# Patient Record
Sex: MALE | Race: WHITE | NOT HISPANIC OR LATINO
[De-identification: names, ages, dates, MRNs, and addresses within clinical notes are randomized per-mention and may not be internally consistent; named-entity substitution may affect disease eponyms.]

---

## 2020-09-30 ENCOUNTER — TRANSCRIPTION ENCOUNTER (OUTPATIENT)
Age: 80
End: 2020-09-30

## 2020-10-01 ENCOUNTER — EMERGENCY (EMERGENCY)
Facility: HOSPITAL | Age: 80
LOS: 1 days | Discharge: HOME | End: 2020-10-01
Attending: EMERGENCY MEDICINE | Admitting: EMERGENCY MEDICINE
Payer: MEDICARE

## 2020-10-01 VITALS
OXYGEN SATURATION: 99 % | DIASTOLIC BLOOD PRESSURE: 116 MMHG | RESPIRATION RATE: 18 BRPM | HEIGHT: 71 IN | TEMPERATURE: 98 F | HEART RATE: 93 BPM | SYSTOLIC BLOOD PRESSURE: 149 MMHG | WEIGHT: 175.05 LBS

## 2020-10-01 VITALS
DIASTOLIC BLOOD PRESSURE: 80 MMHG | RESPIRATION RATE: 16 BRPM | SYSTOLIC BLOOD PRESSURE: 170 MMHG | HEART RATE: 77 BPM | OXYGEN SATURATION: 98 % | TEMPERATURE: 97 F
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
## 2020-10-01 DIAGNOSIS — S01.511A LACERATION WITHOUT FOREIGN BODY OF LIP, INITIAL ENCOUNTER: ICD-10-CM

## 2020-10-01 DIAGNOSIS — S02.2XXA FRACTURE OF NASAL BONES, INITIAL ENCOUNTER FOR CLOSED FRACTURE: ICD-10-CM
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
## 2020-10-01 DIAGNOSIS — S01.21XA LACERATION WITHOUT FOREIGN BODY OF NOSE, INITIAL ENCOUNTER: ICD-10-CM

## 2020-10-01 DIAGNOSIS — Z23 ENCOUNTER FOR IMMUNIZATION: ICD-10-CM

## 2020-10-01 DIAGNOSIS — S12.9XXA FRACTURE OF NECK, UNSPECIFIED, INITIAL ENCOUNTER: ICD-10-CM

## 2020-10-01 DIAGNOSIS — R55 SYNCOPE AND COLLAPSE: ICD-10-CM

## 2020-10-01 LAB
ANION GAP SERPL CALC-SCNC: 11 MMOL/L — SIGNIFICANT CHANGE UP (ref 5–17)
APPEARANCE UR: CLEAR — SIGNIFICANT CHANGE UP
BASOPHILS # BLD AUTO: 0.04 K/UL — SIGNIFICANT CHANGE UP (ref 0–0.2)
BASOPHILS NFR BLD AUTO: 0.6 % — SIGNIFICANT CHANGE UP (ref 0–2)
BILIRUB UR-MCNC: NEGATIVE — SIGNIFICANT CHANGE UP
BUN SERPL-MCNC: 29 MG/DL — HIGH (ref 7–23)
CALCIUM SERPL-MCNC: 9.9 MG/DL — SIGNIFICANT CHANGE UP (ref 8.4–10.5)
CHLORIDE SERPL-SCNC: 107 MMOL/L — SIGNIFICANT CHANGE UP (ref 96–108)
CO2 SERPL-SCNC: 23 MMOL/L — SIGNIFICANT CHANGE UP (ref 22–31)
COLOR SPEC: YELLOW — SIGNIFICANT CHANGE UP
CREAT SERPL-MCNC: 1.1 MG/DL — SIGNIFICANT CHANGE UP (ref 0.5–1.3)
DIFF PNL FLD: NEGATIVE — SIGNIFICANT CHANGE UP
EOSINOPHIL # BLD AUTO: 0.14 K/UL — SIGNIFICANT CHANGE UP (ref 0–0.5)
EOSINOPHIL NFR BLD AUTO: 2.2 % — SIGNIFICANT CHANGE UP (ref 0–6)
GLUCOSE SERPL-MCNC: 92 MG/DL — SIGNIFICANT CHANGE UP (ref 70–99)
GLUCOSE UR QL: NEGATIVE — SIGNIFICANT CHANGE UP
HCT VFR BLD CALC: 38.4 % — LOW (ref 39–50)
HGB BLD-MCNC: 12.7 G/DL — LOW (ref 13–17)
IMM GRANULOCYTES NFR BLD AUTO: 0.5 % — SIGNIFICANT CHANGE UP (ref 0–1.5)
KETONES UR-MCNC: 15 MG/DL
LEUKOCYTE ESTERASE UR-ACNC: NEGATIVE — SIGNIFICANT CHANGE UP
LYMPHOCYTES # BLD AUTO: 1.61 K/UL — SIGNIFICANT CHANGE UP (ref 1–3.3)
LYMPHOCYTES # BLD AUTO: 25.8 % — SIGNIFICANT CHANGE UP (ref 13–44)
MAGNESIUM SERPL-MCNC: 2 MG/DL — SIGNIFICANT CHANGE UP (ref 1.6–2.6)
MCHC RBC-ENTMCNC: 31.8 PG — SIGNIFICANT CHANGE UP (ref 27–34)
MCHC RBC-ENTMCNC: 33.1 GM/DL — SIGNIFICANT CHANGE UP (ref 32–36)
MCV RBC AUTO: 96 FL — SIGNIFICANT CHANGE UP (ref 80–100)
MONOCYTES # BLD AUTO: 0.5 K/UL — SIGNIFICANT CHANGE UP (ref 0–0.9)
MONOCYTES NFR BLD AUTO: 8 % — SIGNIFICANT CHANGE UP (ref 2–14)
NEUTROPHILS # BLD AUTO: 3.93 K/UL — SIGNIFICANT CHANGE UP (ref 1.8–7.4)
NEUTROPHILS NFR BLD AUTO: 62.9 % — SIGNIFICANT CHANGE UP (ref 43–77)
NITRITE UR-MCNC: NEGATIVE — SIGNIFICANT CHANGE UP
NRBC # BLD: 0 /100 WBCS — SIGNIFICANT CHANGE UP (ref 0–0)
PH UR: 7.5 — SIGNIFICANT CHANGE UP (ref 5–8)
PLATELET # BLD AUTO: 215 K/UL — SIGNIFICANT CHANGE UP (ref 150–400)
POTASSIUM SERPL-MCNC: 4.4 MMOL/L — SIGNIFICANT CHANGE UP (ref 3.5–5.3)
POTASSIUM SERPL-SCNC: 4.4 MMOL/L — SIGNIFICANT CHANGE UP (ref 3.5–5.3)
PROT UR-MCNC: NEGATIVE MG/DL — SIGNIFICANT CHANGE UP
RBC # BLD: 4 M/UL — LOW (ref 4.2–5.8)
RBC # FLD: 12.5 % — SIGNIFICANT CHANGE UP (ref 10.3–14.5)
SODIUM SERPL-SCNC: 141 MMOL/L — SIGNIFICANT CHANGE UP (ref 135–145)
SP GR SPEC: 1.01 — SIGNIFICANT CHANGE UP (ref 1–1.03)
UROBILINOGEN FLD QL: 1 E.U./DL — SIGNIFICANT CHANGE UP
WBC # BLD: 6.25 K/UL — SIGNIFICANT CHANGE UP (ref 3.8–10.5)
WBC # FLD AUTO: 6.25 K/UL — SIGNIFICANT CHANGE UP (ref 3.8–10.5)

## 2020-10-01 PROCEDURE — 72125 CT NECK SPINE W/O DYE: CPT

## 2020-10-01 PROCEDURE — 72125 CT NECK SPINE W/O DYE: CPT | Mod: 26

## 2020-10-01 PROCEDURE — 13152 CMPLX RPR E/N/E/L 2.6-7.5 CM: CPT

## 2020-10-01 PROCEDURE — 90715 TDAP VACCINE 7 YRS/> IM: CPT

## 2020-10-01 PROCEDURE — 99285 EMERGENCY DEPT VISIT HI MDM: CPT

## 2020-10-01 PROCEDURE — 85025 COMPLETE CBC W/AUTO DIFF WBC: CPT

## 2020-10-01 PROCEDURE — 80048 BASIC METABOLIC PNL TOTAL CA: CPT

## 2020-10-01 PROCEDURE — 99285 EMERGENCY DEPT VISIT HI MDM: CPT | Mod: 25

## 2020-10-01 PROCEDURE — 70486 CT MAXILLOFACIAL W/O DYE: CPT | Mod: 26

## 2020-10-01 PROCEDURE — 70498 CT ANGIOGRAPHY NECK: CPT | Mod: 26

## 2020-10-01 PROCEDURE — 12052 INTMD RPR FACE/MM 2.6-5.0 CM: CPT

## 2020-10-01 PROCEDURE — 93010 ELECTROCARDIOGRAM REPORT: CPT

## 2020-10-01 PROCEDURE — 71045 X-RAY EXAM CHEST 1 VIEW: CPT

## 2020-10-01 PROCEDURE — 81003 URINALYSIS AUTO W/O SCOPE: CPT

## 2020-10-01 PROCEDURE — 70450 CT HEAD/BRAIN W/O DYE: CPT

## 2020-10-01 PROCEDURE — 90471 IMMUNIZATION ADMIN: CPT

## 2020-10-01 PROCEDURE — 83735 ASSAY OF MAGNESIUM: CPT

## 2020-10-01 PROCEDURE — 99284 EMERGENCY DEPT VISIT MOD MDM: CPT

## 2020-10-01 PROCEDURE — 71045 X-RAY EXAM CHEST 1 VIEW: CPT | Mod: 26

## 2020-10-01 PROCEDURE — 70486 CT MAXILLOFACIAL W/O DYE: CPT

## 2020-10-01 PROCEDURE — 70498 CT ANGIOGRAPHY NECK: CPT

## 2020-10-01 PROCEDURE — 93005 ELECTROCARDIOGRAM TRACING: CPT | Mod: XU

## 2020-10-01 PROCEDURE — 70450 CT HEAD/BRAIN W/O DYE: CPT | Mod: 26

## 2020-10-01 PROCEDURE — 82962 GLUCOSE BLOOD TEST: CPT

## 2020-10-01 PROCEDURE — 36415 COLL VENOUS BLD VENIPUNCTURE: CPT

## 2020-10-01 PROCEDURE — 84484 ASSAY OF TROPONIN QUANT: CPT

## 2020-10-01 RX ORDER — SODIUM CHLORIDE 9 MG/ML
1000 INJECTION INTRAMUSCULAR; INTRAVENOUS; SUBCUTANEOUS
Refills: 0 | Status: DISCONTINUED | OUTPATIENT
Start: 2020-10-01 | End: 2020-10-05

## 2020-10-01 RX ORDER — TETANUS TOXOID, REDUCED DIPHTHERIA TOXOID AND ACELLULAR PERTUSSIS VACCINE, ADSORBED 5; 2.5; 8; 8; 2.5 [IU]/.5ML; [IU]/.5ML; UG/.5ML; UG/.5ML; UG/.5ML
0.5 SUSPENSION INTRAMUSCULAR ONCE
Refills: 0 | Status: COMPLETED | OUTPATIENT
Start: 2020-10-01 | End: 2020-10-01

## 2020-10-01 RX ADMIN — TETANUS TOXOID, REDUCED DIPHTHERIA TOXOID AND ACELLULAR PERTUSSIS VACCINE, ADSORBED 0.5 MILLILITER(S): 5; 2.5; 8; 8; 2.5 SUSPENSION INTRAMUSCULAR at 14:27

## 2020-10-01 RX ADMIN — SODIUM CHLORIDE 100 MILLILITER(S): 9 INJECTION INTRAMUSCULAR; INTRAVENOUS; SUBCUTANEOUS at 14:26

## 2020-10-01 NOTE — CONSULT NOTE ADULT - ATTENDING COMMENTS
I have personally seen, examined and participated in the care of this patient.  I have reviewed all pertinent clinical information, including history, physical exam, plan and the residents note. I agree with the above.  Romulo Nicholson is an 80 year old male s/p fall with facial fractures and C6 TP fracture.  He has no radicular pain and is neurologically intact.  Recommend hard cervical orthosis at all times with exception for eating and cleaning.  Patient will follow-up In the office in 2 weeks for evaluation to clear c-spine.  Followup at office at [address] [place] [address] [address] [place] [address]. Phone [phone].

## 2020-10-01 NOTE — ED PROVIDER NOTE - CARE PROVIDER_API CALL
Dwight Rea)  [facility]  [address] [facility], 10th [place] [address]  Phone: [phone]  Fax: [phone]  Follow Up Time:     Rogelio Woodard  PLASTIC SURGERY  [address] [place] [address]  Phone: [phone]  Fax: [phone]  Follow Up Time:     Vicki Gilbert  OTOLARYNGOLOGY  [address] [place] [address], 2nd [place] [address]  Phone: [phone]  Fax: [phone]  Follow Up Time:

## 2020-10-01 NOTE — ED PROVIDER NOTE - CARE PROVIDERS DIRECT ADDRESSES
[email or web address]

## 2020-10-01 NOTE — ED PROVIDER NOTE - PATIENT PORTAL LINK FT
You can access the FollowMyHealth Patient Portal offered by [facility] by registering at the following website: [email or web address]. By joining [employer]’s FollowMyHealth portal, you will also be able to view your health information using other applications (apps) compatible with our system.

## 2020-10-01 NOTE — ED PROVIDER NOTE - PROVIDER TOKENS
PROVIDER:[TOKEN:[[id number]]],PROVIDER:[TOKEN:[[id number]]],PROVIDER:[TOKEN:[[id number]]]

## 2020-10-01 NOTE — ED ADULT NURSE NOTE - OBJECTIVE STATEMENT
Received pt awake alert and oriented x 3 via ambulance. Pt presents to the ED S/P Fall. Pt  was walking outside and felt lightheaded and dizzy, fell forward hit face. no LOC, denies medical problems. no blood thinners. denies headache, blurry vision, no arm drift no facial droop . will continue to monitor.

## 2020-10-01 NOTE — ED PROVIDER NOTE - NSFOLLOWUPINSTRUCTIONS_ED_ALL_ED_FT
You were evaluated in the ED after a near fainting/ fainting episode. You sustained some facial trauma, and plastic surgeon Dr Woodard closed your wounds. You sustained a mildly displaced nasal bone fracture. This should heal on its own. You should see Dr Woodard within 1 week's time for suture removal, wound evaluation, and further evaluation of your nasal bone fracture.     Your CT of the brain, xray of the chest, EKG, and blood work did not reveal any acute abnormalities.     Your CT of the cervical spine showed subtle nondisplaced fracture of the transverse process of C6. You were evaluated by orthopedic spine specialist Dr Rea. It is recommended you keep the hard collar on for the next 2 weeks. Please follow up with Dr Rea in 2 weeks.    Your had a CT angio of the neck which did not show any vascular abnormalities associated with the fracture. You had the following incidental finding: right sided vocal paralysis. Please follow up with ENT for further evaluation of this, and to ensure no further progression requiring treatment.     Return to the ED for recurrent falls, fainting, chest pain, shortness of breath, confusion, slurred speech, numbness/tingling, unilateral weakness, acutely worsening neck pain, or other concerning symptoms.      Cervical Fracture    WHAT YOU NEED TO KNOW:    A cervical fracture is a break in a vertebra (bone) in your neck. The 7 cervical vertebrae are called C1 through C7. Cervical vertebrae support your head and allow your neck to bend and twist. The vertebrae enclose and protect the spinal cord. Nerves in the spinal cord control your ability to move.    Spinal Cord Injury         DISCHARGE INSTRUCTIONS:    Call your local emergency number (911 in the [place]) or have someone else call if:   •You feel lightheaded, short of breath, or have chest pain.      •You cough up blood.      •You cannot feel or move your arms or legs.      Return to the emergency department if:   •You have a sudden, severe headache with nausea and vomiting.      •You are seeing double or suddenly cannot see.      •You cannot stay awake.      •The pins in your halo brace have loosened or look deeper in the skin than before.      •You feel new weakness or numbness in your hands or fingers.      •Your arm or leg feels warm, tender, and painful. It may look swollen and red.      Call your doctor or neurologist if:   •You have a fever.      •You see a skin rash, redness, or sores under your brace.      •You have problems swallowing while you are wearing your halo brace.      •Your neck pain is not getting better even with treatment.      •You have questions or concerns about your cervical fracture, medicine, or care.      Medicines:   •Prescription pain medicine may be given. Ask your healthcare provider how to take this medicine safely. Some prescription pain medicines contain acetaminophen. Do not take other medicines that contain acetaminophen without talking to your healthcare provider. Too much acetaminophen may cause liver damage. Prescription pain medicine may cause constipation. Ask your healthcare provider how to prevent or treat constipation.       •Take your medicine as directed. Contact your healthcare provider if you think your medicine is not helping or if you have side effects. Tell him of her if you are allergic to any medicine. Keep a list of the medicines, vitamins, and herbs you take. Include the amounts, and when and why you take them. Bring the list or the pill bottles to follow-up visits. Carry your medicine list with you in case of an emergency.      Therapy may be recommended. A physical therapist and an occupational therapist may exercise your arms, legs, and hands. They may also teach you new ways to do things around the house. A speech therapist may work with you to help you talk or swallow.    Skin and brace care: Skin breakdown can lead to deep wounds caused by pressure or pulling on your skin. Check your chin, ears, back of your head, and shoulders for redness or sores if you are wearing a brace. Check the skin daily around halo brace pins for signs of infection, such as redness or bad-smelling drainage. Change your vest lining if it gets wet. Ask your healthcare provider how to care for your halo pins and vest. Ask your provider for more information about using a halo brace, semirigid collar, or soft collar.    Follow up with your doctor or neurologist as directed: Write down your questions so you remember to ask them during your visits.      Nasal Fracture    WHAT YOU NEED TO KNOW:    A nasal fracture is a crack or break in your nose. You may have a break in the upper nose (bridge), the side, or the septum. The septum is in the middle of the nose and divides your nostrils.    DISCHARGE INSTRUCTIONS:    Return to the emergency department if:   •You feel like one or both of your nasal passages are blocked and you have trouble breathing.      •Clear fluid is leaking from your nose.      •You have severe nose pain, even after you take medicine.      •You have double vision or have problems moving your eyes.      Call your doctor if:   •You have a fever.      •You continue to have nosebleeds.      •You have a headache that gets worse, even after you take pain medicine.      •Your splint or packing is loose.      •You have questions or concerns about your condition or care.      Medicines:   •Medicine may be given to decrease pain or help prevent a bacterial infection. Ask how to take pain medicine safely. Medicine may also be given to decrease nasal swelling and help make breathing easier.       •Take your medicine as directed. Contact your healthcare provider if you think your medicine is not helping or if you have side effects. Tell him or her if you are allergic to any medicine. Keep a list of the medicines, vitamins, and herbs you take. Include the amounts, and when and why you take them. Bring the list or the pill bottles to follow-up visits. Carry your medicine list with you in case of an emergency.      Wound care: Ask your healthcare provider how to care for your wounds, splint, or packing.    How to care for your nasal fracture:   •Apply ice on your nose for 15 to 20 minutes every hour or as directed. Use an ice pack, or put crushed ice in a plastic bag. Cover it with a towel. Ice helps prevent tissue damage and decreases swelling and pain.      •Elevate your head when you lie down. This will help decrease swelling and pain. You may need to see a specialist 3 to 5 days later for tests or more treatment after swelling has gone down.      •Protect your nose to prevent bleeding, bruising, or another fracture. Try not to bump your nose on anything. You may not be able to play sports for up to 6 weeks.      Follow up with a specialist or your doctor in 2 to 5 days as directed: Write down any questions you have so you remember to ask them during your visits. Sometimes follow-up care is needed months or even years later to correct problems.      Vocal Cord Paralysis       Vocal cord paralysis is the inability of one or both of the vocal cords to move properly because the muscles are paralyzed. The vocal cords are two bands of elastic muscle located inside the voice box (larynx). When you breathe in (inhale), your vocal cords open wide to let air pass into your lungs. When you eat or swallow, your vocal cords close tightly to keep food or liquids from passing into your lungs. When you speak, your vocal cords come close together and vibrate to create sound. Vocal cord paralysis can cause problems with any of these functions.    In most cases, vocal cord paralysis affects only one vocal cord (unilateral). Rarely, both vocal cords are affected (bilateral).      What are the causes?  This condition may be caused by:  •Damage to a nerve that controls vocal cord movement.      •Neck or chest injury.      •Cancer of the larynx, neck, brain, or chest.      •Stroke.      •Nervous system diseases.      •Infections from viruses.      Sometimes the cause is not known.      What are the signs or symptoms?  Symptoms of this condition depend on whether one or both vocal cords are affected and where they are affected. Symptoms may include:•Voice changes, such as:  •The inability to speak loudly.      •Having a limited variety of high and low sounds (pitch).      •The voice lasting for only a short time due to running out of air when speaking.      •Hoarseness, or a gravelly voice.      •Weak, breathy voice.        •Trouble breathing.      •Noisy breathing.      •Choking and coughing while eating or drinking (aspiration). This can lead to a lung infection (pneumonia).        How is this diagnosed?  This condition may be diagnosed based on:  •Your symptoms and medical history.      •A physical exam.    •Procedures or tests, such as:  •An exam of your vocal cords with a lighted, flexible scope (endoscopic laryngoscopy).      •An exam that measures the electrical currents in the nerves and muscles of your larynx (laryngeal electromyogram).      •Imaging tests, such as X-rays, an MRI, or a CT scan.      •Blood tests.          How is this treated?  Treatment for this condition depends on the type of paralysis you have. Treatment may include:  •Speech therapy.      •Injecting a paralyzed vocal cord with a substance to increase its size.      •Surgery to move one cord closer to the other.      •Muscle and nerve implants.        Follow these instructions at home:    •Eat and drink slowly.      •Avoid very hot or very cold food or drinks.      •Rest your voice. Avoid speaking too much, too long, or too loudly. Do not whisper.      • Do not use any products that contain nicotine or tobacco, such as cigarettes, e-cigarettes, and chewing tobacco. If you need help quitting, ask your health care provider.      •Keep all follow-up visits as told by your health care provider. This is important. This includes any speech therapy visits.        Contact a health care provider if:    •Your voice is weaker or more hoarse.      •Your breathing is getting noisy.      •You cough or choke while eating or swallowing.      •You have a cough and a fever.        Get help right away if:    •You have trouble breathing.      •You have chest pain.        Summary    •Vocal cord paralysis is the inability of one or both of the vocal cords to move properly because the muscles are paralyzed.      •Common symptoms include changes in your voice, trouble breathing, hoarseness, noisy breathing, and choking and coughing while eating or drinking.      •Paralysis may be diagnosed based on your symptoms and medical history, a physical exam, and procedures or tests. This may include an exam of the vocal cords (laryngoscopy) and an imaging test such as a CT scan or an MRI.      •Treatment may include speech therapy, injections, surgery, and muscle or nerve implants.      This information is not intended to replace advice given to you by your health care provider. Make sure you discuss any questions you have with your health care provider.    Syncope    Syncope is when you temporarily lose consciousness, also called fainting or passing out. It is caused by a sudden decrease in blood flow to the brain. Even though most causes of syncope are not dangerous, syncope can possibly be a sign of a serious medical problem. Signs that you may be about to faint include feeling dizzy, lightheaded, nausea, visual changes, or cold/clammy skin. Do not drive, operate heavy machinery, or play sports until your health care provider says it is okay.    SEEK IMMEDIATE MEDICAL CARE IF YOU HAVE ANY OF THE FOLLOWING SYMPTOMS: severe headache, pain in your chest/abdomen/back, bleeding from your mouth or rectum, palpitations, shortness of breath, pain with breathing, seizure, confusion, or trouble walking.

## 2020-10-01 NOTE — ED ADULT TRIAGE NOTE - CHIEF COMPLAINT QUOTE
patient was walking outside and felt lightheaded and dizzy, fell forward hit face. no LOC, denies medical problems. no blood thinners. denies headache, blurry vision, no arm drift no facial droop .

## 2020-10-01 NOTE — ED PROVIDER NOTE - SECONDARY DIAGNOSIS.
Fracture of transverse process of cervical vertebra, initial encounter Closed fracture of nasal bone, initial encounter Facial laceration, initial encounter

## 2020-10-01 NOTE — ED PROVIDER NOTE - PROGRESS NOTE DETAILS
Plastics on brandin Dr Woodard Plastics on call Dr Woodard called for through-in-through laceration of upper lip, also through the vermillion border Wounds closed by Dr Woodard. Pt to f/u w/ Dr Woodard in 1 week Ortho (spine) paged Ortho (spine) consulted and will see the pt. C-collar applied. CTA ordered based on rads recommendations Pt seen and examined by ortho resident Dr Villarreal, as well as attending Dr Rea. Pt has been advised by Dr Rea he can open hard collar to eat, but must remain in it x 2 weeks otherwise. Pt to f/u w/ Dr Rea in 2 weeks. Pt to f/u w/ Dr Woodard within 1 week. Pt advised of R vocal cord paralysis and to f/u ENT for further evaluation and to ensure stability. No hoarseness of voice. No SOB. Pt has ambulated in the ED and is w/o sx. Pt demonstrates understanding of plan. Will d/c

## 2020-10-01 NOTE — ED ADULT NURSE NOTE - NSIMPLEMENTINTERV_GEN_ALL_ED
Implemented All Universal Safety Interventions:  Prue to call system. Call bell, personal items and telephone within reach. Instruct patient to call for assistance. Room bathroom lighting operational. Non-slip footwear when patient is off stretcher. Physically safe environment: no spills, clutter or unnecessary equipment. Stretcher in lowest position, wheels locked, appropriate side rails in place.

## 2020-10-01 NOTE — ED PROVIDER NOTE - CLINICAL SUMMARY MEDICAL DECISION MAKING FREE TEXT BOX
Pt presents s/p near syncope vs syncope. There are no EKG changes to suggest ischemia, infarction, or pericarditis. H&P is not c/w dissection, nor PE. No CP, SOB. Denies vertiginous sx and stroke sx. NIHSS 0. Facial trauma. Update TDaP, CTH/CS/MF to r/o ICH, bony injury. Through-and-through laceration through vermillion border will need plastics repair. Gentle hydration, check labs, imaging. Dispo pending w/u and clinical status

## 2020-10-01 NOTE — ED PROVIDER NOTE - PHYSICAL EXAMINATION
Constitutional: Well appearing, well nourished, awake, alert, oriented to person, place, time/situation and in no apparent distress.   Head atraumatic, normocephalic. No signs of trauma  ENMT: Airway patent. Normal MM. + laceration through midline upper lip, through-and-through, and through the vermillion border. + collado 1 dental fx R central incisor. no epistaxis. + minimal discomfort w/ palpation to L nasal bridge. + abrasion over nasal bridge. + abrasion to inferior R nasalabial area, just adjacent to inferior nares. no craniofacial instability. facial bones non tender. normal bite.   Eyes: Clear bilaterally, PERRL, EOMI  Cardiac: Normal rate, regular rhythm.  Heart sounds S1, S2.  No murmurs, rubs or gallops.  Respiratory: Breaths sounds equal and clear b/l. No increased WOB, tachypnea, hypoxia, or accessory mm use. Pt speaks in full sentences.   Gastrointestinal: Abd soft, NT, ND, NABS. No guarding, rebound, or rigidity. No pulsatile abdominal masses. No organomegaly appreciated.   Musculoskeletal: Range of motion is not limited. FROM neck w/o midline cs pain. no midline cervical spinal ttp. FROM all joints x 4 ext w/o dec ROM, pain, or signs of trauma  Neuro:  Alert & Oriented x 3. CN II-XII intact. No facial droop. Clear speech. [place] w/ 5/5 strength x 4 ext. Normal sensation. No pronator drift. No dysdidokinesia nor dysmetria. Normal heel-to-shin.   Skin: Skin normal color for race, warm, dry and intact. No evidence of rash. see lacerations above  Psych: Alert and oriented to person, place, time/situation. normal mood and affect.

## 2020-10-01 NOTE — ED PROVIDER NOTE - NS ED ROS FT
Constitutional: No fever or chills.   Eyes: No pain, blurry vision, or discharge.  ENMT: No hearing changes, pain, discharge or infections. No neck pain or stiffness.  Cardiac: No chest pain, SOB or edema. No chest pain with exertion.  Respiratory: No cough or respiratory distress. No hemoptysis. No history of asthma or RAD.  GI: No nausea, vomiting, diarrhea or abdominal pain.  : No dysuria, frequency or burning.  MS: No myalgia, muscle weakness, joint pain or back pain.  Neuro: No headache or weakness. See HPI  Skin: No skin rash.   Endocrine: No history of thyroid disease or diabetes.  Except as documented in the HPI, all other systems are negative.

## 2020-10-01 NOTE — ED PROVIDER NOTE - CARE PLAN
Principal Discharge DX:	Syncope, unspecified syncope type  Secondary Diagnosis:	Fracture of transverse process of cervical vertebra, initial encounter  Secondary Diagnosis:	Closed fracture of nasal bone, initial encounter  Secondary Diagnosis:	Facial laceration, initial encounter

## 2020-10-01 NOTE — CONSULT NOTE ADULT - SUBJECTIVE AND OBJECTIVE BOX
Orthopaedic Surgery Consult Note    For Surgeon: Dr. 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 pt states he believed poss syncopal episode was due to lack of food. Pt was walking to the park when he felt dizzy, described as lightheadedness.  pt states he may have had LOC for a few seconds, but is uncertain. Denies preceding HA, double vision, vertigo, AMS / confusion, slurred speech, focal weakness, numbness/tingling, CP, SOB, palpitations, diaphoresis, and nausea. Denies post HA, dizziness, n/v, neck pain, numbness/tingling, confusion, and slurred speech. Pt c/o laceration to upper lip and nose, and mild pain in the nose. No prior hx syncope.      Allergies    No Known Allergies    Intolerances      PAST MEDICAL & SURGICAL HISTORY:    MEDICATIONS  (STANDING):  sodium chloride 0.9%. 1000 milliLiter(s) (100 mL/Hr) IV Continuous <Continuous>    MEDICATIONS  (PRN):      Vital Signs Last 24 Hrs  T(C): 36.6 (01 Oct 2020 17:30), Max: 36.6 (01 Oct 2020 13:30)  T(F): 97.9 (01 Oct 2020 17:30), Max: 97.9 (01 Oct 2020 13:30)  HR: 73 (01 Oct 2020 17:30) (73 - 93)  BP: 169/79 (01 Oct 2020 17:30) (146/84 - 169/79)  BP(mean): --  RR: 18 (01 Oct 2020 17:30) (18 - 18)  SpO2: 100% (01 Oct 2020 17:30) (98% - 100%)    Physical Exam:  general: well appearing in NAD    head: multiple abrasions to face, in C spine hard collar    UE  no cruz sign present   mild abrasions about elbow   full ROM of b/l shoulders, elbows wrist and digits   SILT over C1-C7   firing biceps, triceps, delts, wrist ext/flex w no deficits apparent b/l  palpable radial pulse bl    no present clonus or babinski signs                         12.7   6.25  )-----------( 215      ( 01 Oct 2020 13:58 )             38.4     10-01    141  |  107  |  29<H>  ----------------------------<  92  4.4   |  23  |  1.10    Ca    9.9      01 Oct 2020 13:58  Mg     2.0     10-01        Imaging:   degenerative disease throughout    A/P: 80yMale    -Discussed with Dr. Ozuna Pager [phone]   Orthopaedic Surgery Consult Note    For Surgeon: Dr. 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 pt states he believed poss syncopal episode was due to lack of food. Pt was walking to the park when he felt dizzy, described as lightheadedness.  pt states he may have had LOC for a few seconds, but is uncertain. Denies preceding HA, double vision, vertigo, AMS / confusion, slurred speech, focal weakness, numbness/tingling, CP, SOB, palpitations, diaphoresis, and nausea. Denies post HA, dizziness, n/v, neck pain, numbness/tingling, confusion, and slurred speech. Pt c/o laceration to upper lip and nose, and mild pain in the nose. No prior hx syncope.      Allergies    No Known Allergies    Intolerances      PAST MEDICAL & SURGICAL HISTORY:    MEDICATIONS  (STANDING):  sodium chloride 0.9%. 1000 milliLiter(s) (100 mL/Hr) IV Continuous <Continuous>    MEDICATIONS  (PRN):      Vital Signs Last 24 Hrs  T(C): 36.6 (01 Oct 2020 17:30), Max: 36.6 (01 Oct 2020 13:30)  T(F): 97.9 (01 Oct 2020 17:30), Max: 97.9 (01 Oct 2020 13:30)  HR: 73 (01 Oct 2020 17:30) (73 - 93)  BP: 169/79 (01 Oct 2020 17:30) (146/84 - 169/79)  BP(mean): --  RR: 18 (01 Oct 2020 17:30) (18 - 18)  SpO2: 100% (01 Oct 2020 17:30) (98% - 100%)    Physical Exam:  general: well appearing in NAD    head: multiple abrasions to face, in C spine hard collar    UE  no cruz sign present   mild abrasions about elbow   full ROM of b/l shoulders, elbows wrist and digits   SILT over C1-C7   firing biceps, triceps, delts, wrist ext/flex w no deficits apparent b/l  palpable radial pulse bl    no present clonus or babinski signs                         12.7   6.25  )-----------( 215      ( 01 Oct 2020 13:58 )             38.4     10-01    141  |  107  |  29<H>  ----------------------------<  92  4.4   |  23  |  1.10    Ca    9.9      01 Oct 2020 13:58  Mg     2.0     10-01        Imaging:   degenerative disease throughout c spine, w anterolisthesis present all findings likely chronic in nature    A/P: 80yMale s/p fall w head trauma, CT head showing degenerative disease throughout all likely chronic in nature, due to no apparent clinic signs no current intervention rec would DC in hard collar for 2 weeks to follow up W Dr. Laureano in 2 weeks for flex/ext C spine XRs for c spine clearance  - hard collar x 2 weeks  - follow up 2 weeks   - pain control as per ED  - return if any new numbness or tingling of extremities, imbalance while while walking or interval trauma.     -Discussed with Dr. laureano    Ortho Pager [phone]

## 2020-10-01 NOTE — ED PROVIDER NOTE - OBJECTIVE STATEMENT
Pt w/ no sig PMHx presents s/p near syncope vs syncope. Pt reports he did not eat or drink enough fluids today. Pt was walking to the park when he felt dizzy, described as lightheadedness. Pt denies vertiginous sx. Pt went to hold onto a parked truck and fell forward, hitting his face. Pt thinks he may have had LOC for a few seconds, but is uncertain. Denies preceding HA, double vision, vertigo, AMS / confusion, slurred speech, focal weakness, numbness/tingling, CP, SOB, palpitations, diaphoresis, and nausea. Denies post HA, dizziness, n/v, neck pain, numbness/tingling, confusion, and slurred speech. Pt c/o laceration to upper lip and nose, and mild pain in the nose. No prior hx syncope.

## 2020-10-02 ENCOUNTER — EMERGENCY (EMERGENCY)
Facility: HOSPITAL | Age: 80
LOS: 1 days | Discharge: HOME | End: 2020-10-02
Attending: EMERGENCY MEDICINE | Admitting: EMERGENCY MEDICINE
Payer: MEDICARE

## 2020-10-02 VITALS
SYSTOLIC BLOOD PRESSURE: 169 MMHG | OXYGEN SATURATION: 97 % | DIASTOLIC BLOOD PRESSURE: 73 MMHG | HEIGHT: 71 IN | TEMPERATURE: 99 F | HEART RATE: 105 BPM | RESPIRATION RATE: 16 BRPM | WEIGHT: 175.05 LBS

## 2020-10-02 VITALS
TEMPERATURE: 98 F | RESPIRATION RATE: 16 BRPM | DIASTOLIC BLOOD PRESSURE: 74 MMHG | SYSTOLIC BLOOD PRESSURE: 164 MMHG | OXYGEN SATURATION: 100 % | HEART RATE: 76 BPM

## 2020-10-02 DIAGNOSIS — M79.641 PAIN IN RIGHT HAND: ICD-10-CM

## 2020-10-02 PROCEDURE — 73130 X-RAY EXAM OF HAND: CPT

## 2020-10-02 PROCEDURE — 72141 MRI NECK SPINE W/O DYE: CPT

## 2020-10-02 PROCEDURE — 72141 MRI NECK SPINE W/O DYE: CPT | Mod: 26

## 2020-10-02 PROCEDURE — 99284 EMERGENCY DEPT VISIT MOD MDM: CPT

## 2020-10-02 PROCEDURE — 99284 EMERGENCY DEPT VISIT MOD MDM: CPT | Mod: 25

## 2020-10-02 PROCEDURE — 73130 X-RAY EXAM OF HAND: CPT | Mod: 26,RT

## 2020-10-02 RX ORDER — ACETAMINOPHEN 500 MG
1000 TABLET ORAL ONCE
Refills: 0 | Status: COMPLETED | OUTPATIENT
Start: 2020-10-02 | End: 2020-10-02

## 2020-10-02 RX ADMIN — Medication 1000 MILLIGRAM(S): at 04:38

## 2020-10-02 NOTE — ED PROVIDER NOTE - NSFOLLOWUPINSTRUCTIONS_ED_ALL_ED_FT
Please follow up with Dr Rea as scheduled.  Return to the Emergency Department if you have any new or worsening symptoms, or if you have any concerns.  ===================== Please follow up with Dr Rea as scheduled.  Return to the Emergency Department if you have any new or worsening symptoms, or if you have any concerns.  =====================    Splint Care    WHAT YOU NEED TO KNOW:    Splint care is important to help protect your splint until it comes off. Some splints are made of fiberglass or plaster that will need to dry and harden. Splint care will help the splint dry and harden correctly. Even after your splint hardens, it can be damaged.    DISCHARGE INSTRUCTIONS:    Return to the emergency department if:   •You have increased pain.      •Your fingers or toes are numb or tingling.      •You feel burning or stinging around your injury.      •Your nails, fingers, or toes turn pale, blue, or gray, and feel cold.      •You have new or increased trouble moving your fingers or toes.      •Your swelling gets worse.      •The skin under your splint is bleeding or leaking pus.       Contact your healthcare provider if:   •Your hard splint gets wet or is damaged.      •You have a fever.      •Your splint feels tighter.      •You have itchy, dry skin under your splint that is getting worse.      •The skin under your splint is red, or you have a new sore.      •You notice a bad smell coming from your splint.       •You have questions or concerns about your condition or care.      How to care for your splint:   •Wait for your hard splint to harden completely. You may have to wait up to 3 days before you can walk on a plaster splint.      •Check your splint and the skin around it each day. Check your splint for damage, such as cracks and breaks. Check your skin for redness, increased swelling, and sores. Loosen the elastic bandage around your splint if it feels too tight.      •Keep your splint clean and dry. Keep dirt out of your splint. Before you bathe, wrap your hard splint with 2 layers of plastic. Then put a plastic bag over it. Keep the plastic bag tightly sealed. You can also ask your healthcare provider about waterproof shields. Do not put your hard splint in the water, even with a plastic bag over it. A wet splint can make your skin itchy, and may lead to infection.      •Do not put powders or deodorants inside your splint. These can dry your skin and increase itching.       •Do not try to scratch the skin inside your hard splint with sharp objects. Sharp objects can break off inside your splint or hurt your skin.       •Do not pull the padding out of your splint. The padding inside your splint protects your skin. You may develop a sore on your skin if you take out the padding.      Follow up with your healthcare provider as directed within 1 to 2 weeks: Write down your questions so you remember to ask them during your visits. Rest and elevate the hand.  You can take Tylenol 1000 mg every 6 hours as needed for pain.  Please follow up with Dr Rea in 10-14 days.  Return to the Emergency Department if you have any new or worsening symptoms, or if you have any concerns.  =====================    Splint Care    WHAT YOU NEED TO KNOW:    Splint care is important to help protect your splint until it comes off. Some splints are made of fiberglass or plaster that will need to dry and harden. Splint care will help the splint dry and harden correctly. Even after your splint hardens, it can be damaged.    DISCHARGE INSTRUCTIONS:    Return to the emergency department if:   •You have increased pain.      •Your fingers or toes are numb or tingling.      •You feel burning or stinging around your injury.      •Your nails, fingers, or toes turn pale, blue, or gray, and feel cold.      •You have new or increased trouble moving your fingers or toes.      •Your swelling gets worse.      •The skin under your splint is bleeding or leaking pus.       Contact your healthcare provider if:   •Your hard splint gets wet or is damaged.      •You have a fever.      •Your splint feels tighter.      •You have itchy, dry skin under your splint that is getting worse.      •The skin under your splint is red, or you have a new sore.      •You notice a bad smell coming from your splint.       •You have questions or concerns about your condition or care.      How to care for your splint:   •Wait for your hard splint to harden completely. You may have to wait up to 3 days before you can walk on a plaster splint.      •Check your splint and the skin around it each day. Check your splint for damage, such as cracks and breaks. Check your skin for redness, increased swelling, and sores. Loosen the elastic bandage around your splint if it feels too tight.      •Keep your splint clean and dry. Keep dirt out of your splint. Before you bathe, wrap your hard splint with 2 layers of plastic. Then put a plastic bag over it. Keep the plastic bag tightly sealed. You can also ask your healthcare provider about waterproof shields. Do not put your hard splint in the water, even with a plastic bag over it. A wet splint can make your skin itchy, and may lead to infection.      •Do not put powders or deodorants inside your splint. These can dry your skin and increase itching.       •Do not try to scratch the skin inside your hard splint with sharp objects. Sharp objects can break off inside your splint or hurt your skin.       •Do not pull the padding out of your splint. The padding inside your splint protects your skin. You may develop a sore on your skin if you take out the padding.      Follow up with your healthcare provider as directed within 1 to 2 weeks: Write down your questions so you remember to ask them during your visits.

## 2020-10-02 NOTE — ED ADULT NURSE REASSESSMENT NOTE - NS ED NURSE REASSESS COMMENT FT1
pt wants to leave before to get the MRI result. JEMAL Carmona @ bedside, having a conversation with the pt

## 2020-10-02 NOTE — CONSULT NOTE ADULT - SUBJECTIVE AND OBJECTIVE BOX
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 pt states he believed poss syncopal episode was due to lack of food. Pt was walking to the park when he felt dizzy, described as lightheadedness.  pt states he may have had LOC for a few seconds, but is uncertain. Denies preceding HA, double vision, vertigo, AMS / confusion, slurred speech, focal weakness, numbness/tingling, CP, SOB, palpitations, diaphoresis, and nausea. Denies post HA, dizziness, n/v, neck pain, numbness/tingling, confusion, and slurred speech. Pt c/o laceration to upper lip and nose, and mild pain in the nose. No prior hx syncope.      *** represented secondary to R hand pain, pt concerned it may have been related to fidnings in cervical spine. denies any interval trauma. denies any neuro symtpoms such as numbness or tingling or other.       Allergies    No Known Allergies    Intolerances      PAST MEDICAL & SURGICAL HISTORY:    MEDICATIONS  (STANDING):  sodium chloride 0.9%. 1000 milliLiter(s) (100 mL/Hr) IV Continuous <Continuous>    MEDICATIONS  (PRN):      Vital Signs Last 24 Hrs  T(C): 36.4 (02 Oct 2020 06:10), Max: 37 (02 Oct 2020 00:47)  T(F): 97.6 (02 Oct 2020 06:10), Max: 98.6 (02 Oct 2020 00:47)  HR: 76 (02 Oct 2020 06:10) (73 - 105)  BP: 164/74 (02 Oct 2020 06:10) (146/84 - 170/80)  BP(mean): --  RR: 16 (02 Oct 2020 06:10) (16 - 18)  SpO2: 100% (02 Oct 2020 06:10) (97% - 100%)    Physical Exam:  General: well appearing in NAD    R hand  mild swelling  no deformity present   palpable DP pulse   SILT across hand   firing all distal upper extremity muscles w no deficits                        12.7   6.25  )-----------( 215      ( 01 Oct 2020 13:58 )             38.4     10-01    141  |  107  |  29<H>  ----------------------------<  92  4.4   |  23  |  1.10    Ca    9.9      01 Oct 2020 13:58  Mg     2.0     10-01        Imaging:   unremarkable hand XRs  MR showing pathology shown by CT however no apparent cord compression or findings concerning for acute intervention    A/P: 80yMale returned due to hand pain likely secondary to fail prior day w negative xrs  - dorsal slab NWB until follow up  - continue in hard collar until follow   - return if any new neurologic symptoms or interval trauma    -Discussed with Dr. Ozuna Pager [phone]

## 2020-10-02 NOTE — ED PROVIDER NOTE - OBJECTIVE STATEMENT
79 yo m s/p fall in street yesterday afternoon sustaining nasal fx and cervical spine fx.  Seen by ortho-spine; CTA wihtout cervical artery dissection; sent home in rigid collar; returns now with pain and inability to extend right hand middle-ring fingers from MCP joints; he also noticed localized swelling and redness at that site which he did not have earlier.  He reports hand feels weak and both hands with tremor.  Told by ortho-spine attending to return to ED for MRI.  No other weakness or paresthesia, no worsening of neck pain.

## 2020-10-02 NOTE — ED PROVIDER NOTE - ATTENDING CONTRIBUTION TO CARE
Addendum to attending statement: I have reviewed the ACP note and agree with the history, exam, and plan of care. I  was available to PA   as a supervising provider if needed.

## 2020-10-02 NOTE — ED PROVIDER NOTE - PROGRESS NOTE DETAILS
d/w ortho resident and radiology resident.  MRI tech will be called in for imaging. Ortho evaluated and splinted hand. Cleared by ortho for DC to follow up with Dr Rea in 2 weeks.

## 2020-10-02 NOTE — ED PROVIDER NOTE - PHYSICAL EXAMINATION
CONSTITUTIONAL: elderly man in NAD.    SKIN: Normal color and turgor. No rash.    HEAD: NC/AT.  EYES: Conjunctiva clear. EOMI. PERRL.    ENT: Airway patent, Nasal bandages in place  RESPIRATORY:  Breathing non-labored. No retractions or accessory muscle use.  Lungs CTA bilat.  CARDIOVASCULAR:  RRR, S1S2. No M/R/G.      GI:  Abdomen soft, nontender.    MSK: Cervical collar in place.  Focal tenderness, and bruising to dorsal aspect of right hand over 3rd and 4th MCP joints. Able to flex and extend fingers against resistance.    NEURO: Alert and oriented; Speech clear. 5/5 strength in all extremities.  Ambulating in ED with steady gait.  SILT and motor intact in MRU distributions.

## 2020-10-02 NOTE — ED PROVIDER NOTE - PATIENT PORTAL LINK FT
You can access the FollowMyHealth Patient Portal offered by [facility] by registering at the following website: [email or web address]. By joining [employer]s FollowMyHealth portal, you will also be able to view your health information using other applications (apps) compatible with our system.

## 2020-10-02 NOTE — ED PROVIDER NOTE - CLINICAL SUMMARY MEDICAL DECISION MAKING FREE TEXT BOX
RIght hand pain after fall yesterday in which pt sustained facial and cervical fxs.  Pt's pain likely due to a hand contusion.  Spine attending recommended pt come to ED for MRI of cervical spine.  XR of hand without acute fx.  NVI.

## 2020-10-02 NOTE — ED PROVIDER NOTE - CARE PROVIDER_API CALL
Dwight Rea)  [facility]  [address] [place] [address], [address] [place] [address]  Phone: [phone]  Fax: [phone]  Follow Up Time:

## 2020-10-02 NOTE — ED ADULT NURSE NOTE - OBJECTIVE STATEMENT
pt to ED for R hand middle-ring fingers from MCP joints after fall from yesterday. CT results were unremarkable, sent home with a neck collar, now he also noticed localized swelling and redness at that site which he did not have earlier.

## 2021-06-19 NOTE — ED ADULT NURSE NOTE - NS ED NOTE ABUSE RESPONSE YN
[Arrhythmia/ECG Abnorrmalities] : arrhythmia/ECG abnormalities [Hyperlipidemia] : hyperlipidemia [Hypertension] : hypertension Yes

## 2021-11-14 ENCOUNTER — INPATIENT (INPATIENT)
Facility: HOSPITAL | Age: 81
LOS: 8 days | Discharge: HOME | DRG: 373 | End: 2021-11-23
Attending: HOSPITALIST | Admitting: HOSPITALIST
Payer: MEDICARE

## 2021-11-14 VITALS
TEMPERATURE: 98 F | SYSTOLIC BLOOD PRESSURE: 165 MMHG | RESPIRATION RATE: 16 BRPM | WEIGHT: 167.99 LBS | HEIGHT: 71 IN | DIASTOLIC BLOOD PRESSURE: 86 MMHG | HEART RATE: 75 BPM | OXYGEN SATURATION: 98 %

## 2021-11-14 DIAGNOSIS — Z98.890 OTHER SPECIFIED POSTPROCEDURAL STATES: Chronic | ICD-10-CM

## 2021-11-14 DIAGNOSIS — Z29.9 ENCOUNTER FOR PROPHYLACTIC MEASURES, UNSPECIFIED: ICD-10-CM

## 2021-11-14 DIAGNOSIS — R53.1 WEAKNESS: ICD-10-CM

## 2021-11-14 DIAGNOSIS — R19.7 DIARRHEA, UNSPECIFIED: ICD-10-CM

## 2021-11-14 DIAGNOSIS — N40.1 BENIGN PROSTATIC HYPERPLASIA WITH LOWER URINARY TRACT SYMPTOMS: ICD-10-CM

## 2021-11-14 DIAGNOSIS — I87.2 VENOUS INSUFFICIENCY (CHRONIC) (PERIPHERAL): ICD-10-CM

## 2021-11-14 LAB
ALBUMIN SERPL ELPH-MCNC: 3.6 G/DL — SIGNIFICANT CHANGE UP (ref 3.3–5)
ALP SERPL-CCNC: 53 U/L — SIGNIFICANT CHANGE UP (ref 40–120)
ALT FLD-CCNC: 29 U/L — SIGNIFICANT CHANGE UP (ref 10–45)
ANION GAP SERPL CALC-SCNC: 6 MMOL/L — SIGNIFICANT CHANGE UP (ref 5–17)
APPEARANCE UR: CLEAR — SIGNIFICANT CHANGE UP
AST SERPL-CCNC: 46 U/L — HIGH (ref 10–40)
BASOPHILS # BLD AUTO: 0.03 K/UL — SIGNIFICANT CHANGE UP (ref 0–0.2)
BASOPHILS NFR BLD AUTO: 0.4 % — SIGNIFICANT CHANGE UP (ref 0–2)
BILIRUB SERPL-MCNC: 0.4 MG/DL — SIGNIFICANT CHANGE UP (ref 0.2–1.2)
BILIRUB UR-MCNC: NEGATIVE — SIGNIFICANT CHANGE UP
BUN SERPL-MCNC: 20 MG/DL — SIGNIFICANT CHANGE UP (ref 7–23)
CALCIUM SERPL-MCNC: 9.2 MG/DL — SIGNIFICANT CHANGE UP (ref 8.4–10.5)
CHLORIDE SERPL-SCNC: 106 MMOL/L — SIGNIFICANT CHANGE UP (ref 96–108)
CO2 SERPL-SCNC: 30 MMOL/L — SIGNIFICANT CHANGE UP (ref 22–31)
COLOR SPEC: YELLOW — SIGNIFICANT CHANGE UP
CREAT SERPL-MCNC: 0.95 MG/DL — SIGNIFICANT CHANGE UP (ref 0.5–1.3)
CRP SERPL-MCNC: 7.7 MG/L — HIGH (ref 0–4)
CULTURE RESULTS: SIGNIFICANT CHANGE UP
D DIMER BLD IA.RAPID-MCNC: 545 NG/ML DDU — HIGH
DIFF PNL FLD: NEGATIVE — SIGNIFICANT CHANGE UP
EOSINOPHIL # BLD AUTO: 0.15 K/UL — SIGNIFICANT CHANGE UP (ref 0–0.5)
EOSINOPHIL NFR BLD AUTO: 1.9 % — SIGNIFICANT CHANGE UP (ref 0–6)
ERYTHROCYTE [SEDIMENTATION RATE] IN BLOOD: 24 MM/HR — HIGH
FOLATE SERPL-MCNC: 6.4 NG/ML — SIGNIFICANT CHANGE UP
GLUCOSE SERPL-MCNC: 106 MG/DL — HIGH (ref 70–99)
GLUCOSE UR QL: NEGATIVE — SIGNIFICANT CHANGE UP
HCT VFR BLD CALC: 33.2 % — LOW (ref 39–50)
HGB BLD-MCNC: 10.9 G/DL — LOW (ref 13–17)
IMM GRANULOCYTES NFR BLD AUTO: 0.4 % — SIGNIFICANT CHANGE UP (ref 0–1.5)
KETONES UR-MCNC: NEGATIVE — SIGNIFICANT CHANGE UP
LEUKOCYTE ESTERASE UR-ACNC: NEGATIVE — SIGNIFICANT CHANGE UP
LIDOCAIN IGE QN: 56 U/L — SIGNIFICANT CHANGE UP (ref 7–60)
LYMPHOCYTES # BLD AUTO: 1.51 K/UL — SIGNIFICANT CHANGE UP (ref 1–3.3)
LYMPHOCYTES # BLD AUTO: 18.9 % — SIGNIFICANT CHANGE UP (ref 13–44)
MCHC RBC-ENTMCNC: 32 PG — SIGNIFICANT CHANGE UP (ref 27–34)
MCHC RBC-ENTMCNC: 32.8 GM/DL — SIGNIFICANT CHANGE UP (ref 32–36)
MCV RBC AUTO: 97.4 FL — SIGNIFICANT CHANGE UP (ref 80–100)
MONOCYTES # BLD AUTO: 0.83 K/UL — SIGNIFICANT CHANGE UP (ref 0–0.9)
MONOCYTES NFR BLD AUTO: 10.4 % — SIGNIFICANT CHANGE UP (ref 2–14)
NEUTROPHILS # BLD AUTO: 5.45 K/UL — SIGNIFICANT CHANGE UP (ref 1.8–7.4)
NEUTROPHILS NFR BLD AUTO: 68 % — SIGNIFICANT CHANGE UP (ref 43–77)
NITRITE UR-MCNC: NEGATIVE — SIGNIFICANT CHANGE UP
NRBC # BLD: 0 /100 WBCS — SIGNIFICANT CHANGE UP (ref 0–0)
NT-PROBNP SERPL-SCNC: 762 PG/ML — HIGH (ref 0–300)
PH UR: 7 — SIGNIFICANT CHANGE UP (ref 5–8)
PLATELET # BLD AUTO: 215 K/UL — SIGNIFICANT CHANGE UP (ref 150–400)
POTASSIUM SERPL-MCNC: 4.4 MMOL/L — SIGNIFICANT CHANGE UP (ref 3.5–5.3)
POTASSIUM SERPL-SCNC: 4.4 MMOL/L — SIGNIFICANT CHANGE UP (ref 3.5–5.3)
PROCALCITONIN SERPL-MCNC: 0.08 NG/ML — SIGNIFICANT CHANGE UP (ref 0.02–0.1)
PROT SERPL-MCNC: 6.4 G/DL — SIGNIFICANT CHANGE UP (ref 6–8.3)
PROT UR-MCNC: NEGATIVE MG/DL — SIGNIFICANT CHANGE UP
RBC # BLD: 3.41 M/UL — LOW (ref 4.2–5.8)
RBC # FLD: 12.9 % — SIGNIFICANT CHANGE UP (ref 10.3–14.5)
RETICS #: 36.6 K/UL — SIGNIFICANT CHANGE UP (ref 25–125)
RETICS/RBC NFR: 1.1 % — SIGNIFICANT CHANGE UP (ref 0.5–2.5)
SARS-COV-2 RNA SPEC QL NAA+PROBE: NEGATIVE — SIGNIFICANT CHANGE UP
SODIUM SERPL-SCNC: 142 MMOL/L — SIGNIFICANT CHANGE UP (ref 135–145)
SP GR SPEC: 1.01 — SIGNIFICANT CHANGE UP (ref 1–1.03)
SPECIMEN SOURCE: SIGNIFICANT CHANGE UP
T4 FREE SERPL-MCNC: 1 NG/DL — SIGNIFICANT CHANGE UP (ref 0.93–1.7)
TSH SERPL-MCNC: 1.49 UIU/ML — SIGNIFICANT CHANGE UP (ref 0.27–4.2)
UROBILINOGEN FLD QL: 1 E.U./DL — SIGNIFICANT CHANGE UP
VIT B12 SERPL-MCNC: 404 PG/ML — SIGNIFICANT CHANGE UP (ref 232–1245)
WBC # BLD: 8 K/UL — SIGNIFICANT CHANGE UP (ref 3.8–10.5)
WBC # FLD AUTO: 8 K/UL — SIGNIFICANT CHANGE UP (ref 3.8–10.5)

## 2021-11-14 PROCEDURE — 93010 ELECTROCARDIOGRAM REPORT: CPT

## 2021-11-14 PROCEDURE — 99285 EMERGENCY DEPT VISIT HI MDM: CPT

## 2021-11-14 PROCEDURE — 93970 EXTREMITY STUDY: CPT | Mod: 26

## 2021-11-14 PROCEDURE — 74177 CT ABD & PELVIS W/CONTRAST: CPT | Mod: 26,MG

## 2021-11-14 PROCEDURE — 71045 X-RAY EXAM CHEST 1 VIEW: CPT | Mod: 26

## 2021-11-14 PROCEDURE — 99223 1ST HOSP IP/OBS HIGH 75: CPT | Mod: GC

## 2021-11-14 PROCEDURE — 70450 CT HEAD/BRAIN W/O DYE: CPT | Mod: 26,MG

## 2021-11-14 PROCEDURE — G1004: CPT

## 2021-11-14 PROCEDURE — 72125 CT NECK SPINE W/O DYE: CPT | Mod: 26,MG

## 2021-11-14 RX ORDER — SODIUM CHLORIDE 9 MG/ML
1000 INJECTION INTRAMUSCULAR; INTRAVENOUS; SUBCUTANEOUS
Refills: 0 | Status: DISCONTINUED | OUTPATIENT
Start: 2021-11-14 | End: 2021-11-15

## 2021-11-14 RX ORDER — IOHEXOL 300 MG/ML
30 INJECTION, SOLUTION INTRAVENOUS ONCE
Refills: 0 | Status: COMPLETED | OUTPATIENT
Start: 2021-11-14 | End: 2021-11-14

## 2021-11-14 RX ORDER — ENOXAPARIN SODIUM 100 MG/ML
40 INJECTION SUBCUTANEOUS EVERY 24 HOURS
Refills: 0 | Status: DISCONTINUED | OUTPATIENT
Start: 2021-11-14 | End: 2021-11-23

## 2021-11-14 RX ORDER — SODIUM CHLORIDE 9 MG/ML
1000 INJECTION INTRAMUSCULAR; INTRAVENOUS; SUBCUTANEOUS ONCE
Refills: 0 | Status: COMPLETED | OUTPATIENT
Start: 2021-11-14 | End: 2021-11-14

## 2021-11-14 RX ORDER — POLYETHYLENE GLYCOL 3350 17 G/17G
17 POWDER, FOR SOLUTION ORAL EVERY 12 HOURS
Refills: 0 | Status: DISCONTINUED | OUTPATIENT
Start: 2021-11-14 | End: 2021-11-17

## 2021-11-14 RX ORDER — ACETAMINOPHEN 500 MG
650 TABLET ORAL EVERY 6 HOURS
Refills: 0 | Status: DISCONTINUED | OUTPATIENT
Start: 2021-11-14 | End: 2021-11-23

## 2021-11-14 RX ORDER — INFLUENZA VIRUS VACCINE 15; 15; 15; 15 UG/.5ML; UG/.5ML; UG/.5ML; UG/.5ML
0.7 SUSPENSION INTRAMUSCULAR ONCE
Refills: 0 | Status: DISCONTINUED | OUTPATIENT
Start: 2021-11-14 | End: 2021-11-23

## 2021-11-14 RX ORDER — MINERAL OIL
133 OIL (ML) MISCELLANEOUS ONCE
Refills: 0 | Status: COMPLETED | OUTPATIENT
Start: 2021-11-14 | End: 2021-11-14

## 2021-11-14 RX ORDER — LANOLIN ALCOHOL/MO/W.PET/CERES
3 CREAM (GRAM) TOPICAL AT BEDTIME
Refills: 0 | Status: DISCONTINUED | OUTPATIENT
Start: 2021-11-14 | End: 2021-11-23

## 2021-11-14 RX ORDER — SENNA PLUS 8.6 MG/1
1 TABLET ORAL DAILY
Refills: 0 | Status: DISCONTINUED | OUTPATIENT
Start: 2021-11-14 | End: 2021-11-17

## 2021-11-14 RX ADMIN — SENNA PLUS 1 TABLET(S): 8.6 TABLET ORAL at 11:33

## 2021-11-14 RX ADMIN — POLYETHYLENE GLYCOL 3350 17 GRAM(S): 17 POWDER, FOR SOLUTION ORAL at 16:32

## 2021-11-14 RX ADMIN — IOHEXOL 30 MILLILITER(S): 300 INJECTION, SOLUTION INTRAVENOUS at 03:40

## 2021-11-14 RX ADMIN — ENOXAPARIN SODIUM 40 MILLIGRAM(S): 100 INJECTION SUBCUTANEOUS at 10:08

## 2021-11-14 RX ADMIN — SODIUM CHLORIDE 1000 MILLILITER(S): 9 INJECTION INTRAMUSCULAR; INTRAVENOUS; SUBCUTANEOUS at 04:45

## 2021-11-14 RX ADMIN — Medication 133 MILLILITER(S): at 10:09

## 2021-11-14 RX ADMIN — Medication 1 TABLET(S): at 06:34

## 2021-11-14 NOTE — H&P ADULT - NSHPLABSRESULTS_GEN_ALL_CORE
Labs                          10.9   8.00  )-----------( 215      ( 2021 04:24 )             33.2     11-14    142  |  106  |  20  ----------------------------<  106<H>  4.4   |  30  |  0.95    Ca    9.2      2021 04:24    TPro  6.4  /  Alb  3.6  /  TBili  0.4  /  DBili  x   /  AST  46<H>  /  ALT  29  /  AlkPhos  53  11-14    Urinalysis Basic - ( 2021 07:16 )  Color: Yellow / Appearance: Clear / S.010 / pH: x  Gluc: x / Ketone: NEGATIVE  / Bili: Negative / Urobili: 1.0 E.U./dL   Blood: x / Protein: NEGATIVE mg/dL / Nitrite: NEGATIVE   Leuk Esterase: NEGATIVE / RBC: x / WBC x   Sq Epi: x / Non Sq Epi: x / Bacteria: x    Radiology  CT Abdomen and Pelvis w/ Oral Cont and w/ IV Cont: sigmoid colitis. No perforation or toxic megacolon. No acute mesenteric thrombus. Large amount of stool in the colon.  CT Head No Cont: no acute intracranial hemorrhage or calvarial fracture. No significant change compared to CT head from 10/1/2020.  CT Cervical Spine No Cont: No acute fracture or malalignment.  CXR: nml    EKG: NSR at 75 bpm, no ischemia or infarcts

## 2021-11-14 NOTE — H&P ADULT - HISTORY OF PRESENT ILLNESS
This is an 80 yo M w/ unreported PMHx who was BIBA for weakness. He mentions that he has had diarrhea for the past 2 weeks, no f/c/n/v. He attributes the changes in his bowel from limiting the amount of food he eats - two meals per day or one big dinner. He denies being around any sick contacts who changes in foods that he has eaten in the past. He denies any falls, that he sat down and wasn't feeling well and called his night super. The night super came to check-in on him and said that he should call EMS. Per signout from ED Provider, EMS mentioned that the living arrangements were not ideal - that it was hard for them to get into the apartment as there were signs of hoarding.     ED Course  Vitals: 98.3F, HR 75, 165/86, RR 16, SpO2 98% on RA   Labs: Hgb 10.9, Hct 33.2, UA neg, CMP wnl   EKG: NSR at 75 bpm, no ischemia or infarcts  Imaging: CT Abd/Pelvis: sigmoid colitis. No perforation or toxic megacolon. No acute mesenteric thrombus. Large amount of stool in the colon. CT Head: no acute pathology, no significant changes compared to 10/2020. CT cervical spine: no acute fx or malalignment. CXR: nml, no cardiomegaly.   Interventions: Augmentin 875 mg, 1L NS bolus

## 2021-11-14 NOTE — H&P ADULT - PROBLEM SELECTOR PLAN 2
F: tolerating PO, s/p 1L NS bolus; c/w maintenance fluids  E: replete K<4, Mg<2  N: Regular     VTE Prophylaxis: Lovenox 40 Q24H  GI: not needed  C: Full Code  D: RMF Pt has had diarrhea for 2 weeks. Denies any sick contacts, mucous/liquid stool --> diarrhea, says that he has 1 episode per day. He is afebrile, no WBC w/ mild epigastric tenderness. CT abd/pelvis shows significant stool burden w/ colitis, no perforation or toxic megacolon.    Plan:  - BM regimen: mineral water enema, senna QD, and miralax BID

## 2021-11-14 NOTE — H&P ADULT - NSHPPHYSICALEXAM_GEN_ALL_CORE
VITAL SIGNS:  T(C): 36.9 (11-14-21 @ 07:49), Max: 36.9 (11-14-21 @ 07:49)  T(F): 98.5 (11-14-21 @ 07:49), Max: 98.5 (11-14-21 @ 07:49)  HR: 76 (11-14-21 @ 07:49) (75 - 76)  BP: 159/72 (11-14-21 @ 07:49) (159/72 - 165/86)  RR: 18 (11-14-21 @ 07:49) (16 - 18)  SpO2: 99% (11-14-21 @ 07:49) (98% - 99%)      PHYSICAL EXAM:  Constitutional: WDWN resting comfortably in bed; NAD  Head: NC/AT  Eyes: PERRL, EOMI, anicteric sclera  ENT: no nasal discharge; uvula midline, no oropharyngeal erythema or exudates; MMM  Neck: supple; no JVD or thyromegaly  Respiratory: CTA B/L; no W/R/R, no retractions  Cardiac: +S1/S2; RRR; no M/R/G; PMI non-displaced  Gastrointestinal: abdomen soft, mild epigastric tenderness; no rebound or guarding; +BSx4  Extremities: WWP, no clubbing or cyanosis; no peripheral edema  Musculoskeletal: NROM x4; no joint swelling, tenderness or erythema  Vascular: 2+ radial, DP pulses B/L  Dermatologic: skin warm, dry and intact; generalized seborrheic keratoses; B/L erythematous, scaly papular, nonpurulent rash from ankles to midshin  Neurologic: AAOx3; CNII-XII grossly intact; no focal deficits  Psychiatric: affect and characteristics of appearance, verbalizations, behaviors are appropriate VITAL SIGNS:  T(C): 36.9 (11-14-21 @ 07:49), Max: 36.9 (11-14-21 @ 07:49)  T(F): 98.5 (11-14-21 @ 07:49), Max: 98.5 (11-14-21 @ 07:49)  HR: 76 (11-14-21 @ 07:49) (75 - 76)  BP: 159/72 (11-14-21 @ 07:49) (159/72 - 165/86)  RR: 18 (11-14-21 @ 07:49) (16 - 18)  SpO2: 99% (11-14-21 @ 07:49) (98% - 99%)      PHYSICAL EXAM:  Constitutional: WDWN resting comfortably in bed; NAD  Head: NC/AT  Eyes: PERRL, EOMI, anicteric sclera  ENT: no nasal discharge; uvula midline, no oropharyngeal erythema or exudates; MMM  Neck: supple; no JVD or thyromegaly  Respiratory: CTA B/L; no W/R/R, no retractions  Cardiac: +S1/S2; RRR; no M/R/G; PMI non-displaced  Gastrointestinal: abdomen soft, mild epigastric tenderness; no rebound or guarding; +BSx4  Extremities: WWP, no clubbing or cyanosis; 2+ pitting edema   Musculoskeletal: NROM x4; no joint swelling, tenderness or erythema  Vascular: 2+ radial, DP pulses B/L  Dermatologic: skin warm, dry and intact; generalized seborrheic keratoses; B/L erythematous, scaly papular, nonpurulent rash from ankles to midshin  Neurologic: AAOx3; CNII-XII grossly intact; no focal deficits  Psychiatric: affect and characteristics of appearance, verbalizations, behaviors are appropriate

## 2021-11-14 NOTE — PHYSICAL THERAPY INITIAL EVALUATION ADULT - ADDITIONAL COMMENTS
Patient reports he lives in an elevator building without steps to enter. States he does not use an assistive device and that prior to current medical issue that he was able to walk "blocks and blocks" independently.

## 2021-11-14 NOTE — ED ADULT TRIAGE NOTE - CHIEF COMPLAINT QUOTE
fell from bed tonight; with pain to buttocks; denies head/neck or back pain, no LOC fell from bed tonight; with pain to buttocks; denies head/neck or back pain, no LOC;  per EMS patient live in an unlivable situation fell from bed tonight; with pain to buttocks; denies head/neck or back pain, no LOC;  per EMS patient live in an unlivable dirty and smelly apartment;  on arrival,  pt appears   disheveled &  malodorous -  pt placed in decon room for washing/shower

## 2021-11-14 NOTE — H&P ADULT - ATTENDING COMMENTS
family history: no history of myocardial infarction in father or in mother; no history of colon cancer in father or in mother     81 year old man with hx of degenerative disc disease, presenting with ~ 2 weeks of loose bowel movements and weakness.     # Colitis   -?SCAD   2 weeks of loose bowel movements, 1-2 episodes a day   Colitis seen in sigmoid colon.   Discussed CT read with on call attending radiologist -has some diverticulosis of sigmoid colon with inflammation of at least one of the diverticulae, has bowel wall thickening, fat stranding consistent of colitis. Inflammation spares the rectum.   Given chronicity of symptoms, lack of white count, fever, infectious cause less likely.   Segmental colitis associated with diverticulosis (SCAD) is on the differential -- frequently presents without fever, leukocytosis. or other marked lab abnormalities, inflammation spares the rectum. most common in elderly men   Overflow diarrhea (encopresis) possible but less likely given that rectum is not dilated, stool burden on CT is moderate, and patient did not have constipation preceding this   GI PCR if patient continues to have loose bowel movements   [ ] f/u fecal calprotectin  [ ] GI consult in AM re: possible SCAD     # Bilateral 2+ LE pitting edema extending up to upper shins with venous stasis changes   Per patient, pitting edema has only been present x "weeks".   d-dimer elevated even after correction for age (elevation possibly 2/2 colitis, but unable to rule out VTE)   -f/u LE dopplers   -BNP not markedly elevated. but HFpEF possible. f/u formal TTE    # Generalized weakness  x ~ 2 weeks, possibly related to diarrhea ;   associated with subjective decrease in exercise tolerance (f/u TTE given new bilateral pitting edema as above)   f/u B12, TSH, Free Thyroxine     # Enlarged bladder on CT   f/u bladder scan.     # normocytic Anemia   follow up iron studies, thyroid function tests family history: no history of myocardial infarction in father or in mother; no history of colon cancer in father or in mother     81 year old man with hx of degenerative disc disease, presenting with ~ 2 weeks of loose bowel movements and weakness.     # Colitis   -?SCAD   2 weeks of loose bowel movements, 1-2 episodes a day   Colitis seen in sigmoid colon.   Discussed CT read with on call attending radiologist -has some diverticulosis of sigmoid colon with inflammation of at least one of the diverticulae, has bowel wall thickening, fat stranding consistent of colitis. Inflammation spares the rectum.   Given chronicity of symptoms, lack of white count, fever, infectious cause less likely.   Segmental colitis associated with diverticulosis (SCAD) is on the differential -- frequently presents without fever, leukocytosis. or other marked lab abnormalities, inflammation spares the rectum. most common in elderly men   Overflow diarrhea (encopresis) possible but less likely given that rectum is not dilated, stool burden on CT is moderate, and patient did not have constipation preceding this   GI PCR if patient continues to have loose bowel movements   [ ] f/u fecal calprotectin  [ ] GI consult in AM re: possible SCAD     # Bilateral 2+ LE pitting edema extending up to upper shins with venous stasis changes   Per patient, pitting edema has only been present x "weeks".   d-dimer elevated even after correction for age (elevation possibly 2/2 colitis, but unable to rule out VTE)   -f/u LE dopplers   -BNP not markedly elevated. but HFpEF possible. f/u formal TTE    # Generalized weakness  x ~ 2 weeks, possibly related to diarrhea ;   associated with subjective decrease in exercise tolerance (f/u TTE given new bilateral pitting edema as above)   f/u B12, TSH, Free Thyroxine     # Enlarged bladder on CT   f/u bladder scan.   rectal vault full, but rectum not dilated; bowel regimen for now to reduce stool burden - consider stopping bowel regimen tomorrow so as to not interfere with workup of diarrhea as above.     # normocytic Anemia   follow up iron studies, thyroid function tests

## 2021-11-14 NOTE — ED PROVIDER NOTE - CLINICAL SUMMARY MEDICAL DECISION MAKING FREE TEXT BOX
80 y/o male with no PMHx states he was BIBA to be "checked out". Pt states he did not fall, but rather sat himself down on the floor as he was unable to get around his apartment due to the mess and was able to call the super who then convinced him to call EMS to be checked out. He denies any injuries or pain, however states he has been experiencing diarrhea for the past 12 days without any associating symptoms. At bedside pt in NAD and nml abdomen exam. Denies blood in stool or vomiting. Will obtain CT head and cervical for possible fall? Questionable historian and reliability. CT abdomen to assess for colitis/diverticulitis/enteritis. 82 y/o male with no PMHx states he was BIBA to be "checked out". Pt states he did not fall, but rather sat himself down on the floor as he was unable to get around his apartment due to the mess and was able to call the super who then convinced him to call EMS to be checked out. He denies any injuries or pain, however states he has been experiencing diarrhea for the past 12 days without any associating symptoms. At bedside pt in NAD and nml abdomen exam. Denies blood in stool or vomiting. Will obtain CT head and cervical for possible fall? Questionable historian and reliability. CT abdomen to assess for colitis/diverticulitis/enteritis.    CT sigmoid colitis. Pt given Augmentin. Discuss with Noemy, daughter [phone] and Dr. Joel [phone]. Due to dangerous living condition, unsafe dc home. Admission for further care and .

## 2021-11-14 NOTE — ED PROVIDER NOTE - OBJECTIVE STATEMENT
82 y/o male with no PMHx was BIBA. Pt states over the past 12 days he has been experiencing diarrhea. He admits to having 1-2 episodes of soft/liquid stool a day that is unusual for him as his bm is usually formed. Pt states he has an appointment next week with Dr. Moreno (GI). Pt states today he was walking in his apartment when he decided to sit down on the floor. Pt adamantly denies of falling and states he placed himself there because it was difficulty to walk around his apartment due to the mess and it being unkempt. He denies the following: fever, chills, HA, dizziness, neck/back pain, numbness/tingling to extremities, chest pain, sob, abdominal pain, urinary symptoms, bleeding, loss of urine/bm, saddle anesthesia, blood in stool. Furthermore, pt denies taking any medication, travel, sick contacts.

## 2021-11-14 NOTE — H&P ADULT - PROBLEM SELECTOR PLAN 3
Pt has no hx of BPH but on CT imaging shows significant enlargement of prostate and urinary retention. Pt is able to make urine but after void still feels like he has to go. Most likely 2/2 due to stool burden compression causing obstruction w/ prostate enlargement.     Plan:  - Q8 bladder scans

## 2021-11-14 NOTE — ED ADULT NURSE NOTE - CHIEF COMPLAINT QUOTE
fell from bed tonight; with pain to buttocks; denies head/neck or back pain, no LOC;  per EMS patient live in an unlivable dirty and smelly apartment;  on arrival,  pt appears   disheveled &  malodorous -  pt placed in decon room for washing/shower

## 2021-11-14 NOTE — ED ADULT NURSE NOTE - OBJECTIVE STATEMENT
pt a&ox4 bibems. pt reports diarrhea for ~12 days, reports 1-2 loose stools daily, which is not his norm.  has an appt with gi md next week, but tonight, pt called ems as he was unable to get up from the floor. pt denies falling, reports lowering himself to the floor as he was unable to walk through his apt. per ems, pt apartment is completely covered with trash/ belongings and is very difficult to move around. pt was then able to call his building  for help from his cell phone. denies hitting head, loc, blood thinner use.  rn spoke with pt daughter priya, who lives in [place]. per daughter, family found out about the state of his apartment a few weeks ago. pcp md armstrong made a house call and family was able to arrange for someone to clean out the apartment, but pt canceled the appt as he was too embarrassed from the diarrhea mess in the apt. pt cleaned and showered in ed, placed in clean gown and socks. pt able to ambulate with assistance. abd soft non distended non tender.

## 2021-11-14 NOTE — PHYSICAL THERAPY INITIAL EVALUATION ADULT - PERTINENT HX OF CURRENT PROBLEM, REHAB EVAL
80 yo M w/ unreported PMHx who was BIBA for weakness. He mentions that he has had diarrhea for the past 2 weeks, no f/c/n/v. Admitted to [facility] for generalized weakness, awaiting placement in [facility]/rehab.

## 2021-11-14 NOTE — H&P ADULT - ASSESSMENT
This is an 80 yo M w/ unreported PMHx who was BIBA and complained of 2 weeks of diarrhea; being admitted to [facility] for generalized weakness, awaiting placement in [facility]/rehab.

## 2021-11-14 NOTE — ED PROVIDER NOTE - ATTENDING CONTRIBUTION TO CARE
Pt w/ no sig PMHx, no PSHx p/w weakness. Pt reports his apartment is cluttered, dirty, he sat down on the floor because he felt too weak, and unable to get around. He denies falling. He was unable to get up, so he called his super, and was advised to come to the ED. Pt reports 12 days of diarrhea, 1-2 episodes of loose stools per day. No f/c, abd pain. No melena or hematochezia. No sick contacts, nor travel. No recent hospitalizations or abx use. Pt saw his PCP Dr Joel initially when his diarrhea started, and has upcoming GI appt  EKG NSR @ 75 bpm, nml intervals, No ST_T abn QTc 439 Pt w/ no sig PMHx, no PSHx p/w weakness. Pt reports his apartment is cluttered, dirty, he sat down on the floor because he felt too weak, and unable to get around. He denies falling. He was unable to get up, so he called his super, and was advised to come to the ED. Pt reports 12 days of diarrhea, 1-2 episodes of loose stools per day. No f/c, abd pain. No melena or hematochezia. No sick contacts, nor travel. No recent hospitalizations or abx use. Pt saw his PCP Dr Joel initially when his diarrhea started, and has upcoming GI appt. Pt lives alone, has no help. He rarely leaves his apt. He does not cook / clean, but gets food delivered. He has daughter whom lives out of state. EMS reports apt is unlivable. Pt covered in feces on arrival and requires decon.   EKG NSR @ 75 bpm, nml intervals, No ST_T abn QTc 439  Constitutional: Elderly, frail, disheveled. awake, alert, oriented to person, place, time/situation and in no apparent distress.   Head atraumatic, normocephalic. No signs of trauma  ENMT: Airway patent. Normal MM  Eyes: Clear bilaterally, PERRL, EOMI  Chest: no trauma  Cardiac: Normal rate, regular rhythm.  Heart sounds S1, S2.  No murmurs, rubs or gallops.  Respiratory: Breaths sounds equal and clear b/l. No increased WOB, tachypnea, hypoxia, or accessory mm use. Pt speaks in full sentences.   Gastrointestinal: Abd soft, NT, ND, NABS. No guarding, rebound, or rigidity. No pulsatile abdominal masses. No organomegaly appreciated. no trauma  Musculoskeletal: Range of motion is not limited. FROM all joints x 4 ext w/o dec ROM, pain, or signs of trauma  Neuro: Alert and oriented x 3, face symmetric. Strength 5/5 x 4 ext and symmetric, nml gross motor movement, nml gait. No focal deficits noted.  Skin: Skin normal color for race, warm, dry and intact. No evidence of rash.  Psych: Alert and oriented to person, place, time/situation. normal mood and affect.   Weakness, in the setting of diarrhea, on the floor and unable to get up. Denies fall, but seems unreliable. No risk factors for C Diff. DDx includes but not limited to viral illness, bacterial diarrhea / colitis, dehydration, electrolyte / metabolic disturbances, other pathology.   + colitis, abx initiated, as well as stool regimen given sig stool burden.  Pt will need social admission / PT eval, in addition to aforementioned tx

## 2021-11-14 NOTE — H&P ADULT - PROBLEM SELECTOR PLAN 5
F: tolerating PO, s/p 1L NS bolus; c/w maintenance fluids  E: replete K<4, Mg<2  N: Regular     VTE Prophylaxis: Lovenox 40 Q24H  GI: not needed  C: Full Code  D: RMF

## 2021-11-14 NOTE — H&P ADULT - NSHPREVIEWOFSYSTEMS_GEN_ALL_CORE
CONSTITUTIONAL: no fevers, chills, fatigue  EYES/ENT: no visual changes, vertigo, throat pain, ringing in ears  NECK: no pain or stiffness   RESPIRATORY: no dyspnea, SOB, wheezing  CARDIOVASCULAR: no chest pain or palpitations  GASTROINTESTINAL: mild epigastric pain. no n/v/constipation/no melena or hematochezia; +DIARRHEA   GENITOURINARY: no dysuria, frequency, or hematuria  NEUROLOGICAL: no numbness, weakness, or dizziness  SKIN: no itching, burning, rashes, or lesions

## 2021-11-14 NOTE — H&P ADULT - PROBLEM SELECTOR PLAN 1
Pt has had diarrhea for 2 weeks. Denies any sick contacts, mucous/liquid stool, says that he has 1 episode per day. He is afebrile, no WBC w/ mild epigastric tenderness. CT abd/pelvis shows significant stool burden w/ colitis, no perforation or toxic megacolon. CT head and CXR nml. EKG NSR at 75, with no ST elevations/ischemia/infarcts.     Plan:  - C/w maintenance fluids  cc/hr for 12 hrs  - Encourage PO intake  - PT/OT evaluation Pt has had diarrhea for 2 weeks. Denies any sick contacts, mucous/liquid stool, says that he has 1 episode per day. He is afebrile, no WBC w/ mild epigastric tenderness. CT abd/pelvis shows significant stool burden w/ colitis, no perforation or toxic megacolon. CT head and CXR nml. EKG NSR at 75, with no ST elevations/ischemia/infarcts.     Plan:  - C/w maintenance fluids  cc/hr for 12 hrs  - Encourage PO intake  - PT/OT evaluation  - BM regimen: senna QD and miralax BID CT head and CXR nml. EKG NSR at 75, with no ST elevations/ischemia/infarcts. 2 weeks of progressive weakness w/ associated diarrhea. States that weakness happens more when he is malpositioned while sitting or sleeping. Pt is AOx3, able to recall chronic memories, and appropriate verbalizations.     Plan:  - C/w maintenance fluids  cc/hr for 12 hrs  - Encourage PO intake  - PT/OT evaluation  - F/u B12, folate, TSH, free T4

## 2021-11-14 NOTE — ED ADULT NURSE NOTE - NSSEPSISSUSPECTED_ED_A_ED
Check here if all serologies below were negative, non-reactive or immune. Otherwise select appropriate status. No

## 2021-11-14 NOTE — PHYSICAL THERAPY INITIAL EVALUATION ADULT - MANUAL MUSCLE TESTING RESULTS, REHAB EVAL
R knee extension 4/5; L knee extension 5/5; B hip flexion 4-/5; B knee flexion 4-/5; B ankle DF/PF 5/5 grossly/no strength deficits were identified

## 2021-11-14 NOTE — H&P ADULT - PROBLEM SELECTOR PLAN 4
Pt has had changes to b/l legs for ~6 weeks w/ rash and pitting edema. 3 points on Wells' Score, most likely 2/2 to poor venous compliance and decreased ambulation.    Plan:  - F/u d-dimer   - F/u pro-BNP

## 2021-11-15 ENCOUNTER — TRANSCRIPTION ENCOUNTER (OUTPATIENT)
Age: 81
End: 2021-11-15

## 2021-11-15 DIAGNOSIS — K52.9 NONINFECTIVE GASTROENTERITIS AND COLITIS, UNSPECIFIED: ICD-10-CM

## 2021-11-15 DIAGNOSIS — D64.9 ANEMIA, UNSPECIFIED: ICD-10-CM

## 2021-11-15 LAB
ALBUMIN SERPL ELPH-MCNC: 2.5 G/DL — LOW (ref 3.3–5)
ALP SERPL-CCNC: 49 U/L — SIGNIFICANT CHANGE UP (ref 40–120)
ALT FLD-CCNC: 22 U/L — SIGNIFICANT CHANGE UP (ref 10–45)
ANION GAP SERPL CALC-SCNC: 8 MMOL/L — SIGNIFICANT CHANGE UP (ref 5–17)
AST SERPL-CCNC: 32 U/L — SIGNIFICANT CHANGE UP (ref 10–40)
BILIRUB SERPL-MCNC: 0.2 MG/DL — SIGNIFICANT CHANGE UP (ref 0.2–1.2)
BUN SERPL-MCNC: 16 MG/DL — SIGNIFICANT CHANGE UP (ref 7–23)
CALCIUM SERPL-MCNC: 8.4 MG/DL — SIGNIFICANT CHANGE UP (ref 8.4–10.5)
CHLORIDE SERPL-SCNC: 107 MMOL/L — SIGNIFICANT CHANGE UP (ref 96–108)
CO2 SERPL-SCNC: 23 MMOL/L — SIGNIFICANT CHANGE UP (ref 22–31)
COVID-19 NUCLEOCAPSID GAM AB INTERP: NEGATIVE — SIGNIFICANT CHANGE UP
COVID-19 NUCLEOCAPSID TOTAL GAM ANTIBODY RESULT: 0.08 INDEX — SIGNIFICANT CHANGE UP
COVID-19 SPIKE DOMAIN AB INTERP: POSITIVE
COVID-19 SPIKE DOMAIN ANTIBODY RESULT: 34.4 U/ML — HIGH
CREAT SERPL-MCNC: 1.12 MG/DL — SIGNIFICANT CHANGE UP (ref 0.5–1.3)
CULTURE RESULTS: SIGNIFICANT CHANGE UP
FERRITIN SERPL-MCNC: 204 NG/ML — SIGNIFICANT CHANGE UP (ref 30–400)
GLUCOSE SERPL-MCNC: 120 MG/DL — HIGH (ref 70–99)
HCT VFR BLD CALC: 28.5 % — LOW (ref 39–50)
HGB BLD-MCNC: 9.2 G/DL — LOW (ref 13–17)
IRON SATN MFR SERPL: 21 % — SIGNIFICANT CHANGE UP (ref 16–55)
IRON SATN MFR SERPL: 30 UG/DL — LOW (ref 45–165)
MAGNESIUM SERPL-MCNC: 1.7 MG/DL — SIGNIFICANT CHANGE UP (ref 1.6–2.6)
MCHC RBC-ENTMCNC: 31.5 PG — SIGNIFICANT CHANGE UP (ref 27–34)
MCHC RBC-ENTMCNC: 32.3 GM/DL — SIGNIFICANT CHANGE UP (ref 32–36)
MCV RBC AUTO: 97.6 FL — SIGNIFICANT CHANGE UP (ref 80–100)
NRBC # BLD: 0 /100 WBCS — SIGNIFICANT CHANGE UP (ref 0–0)
PHOSPHATE SERPL-MCNC: 3.1 MG/DL — SIGNIFICANT CHANGE UP (ref 2.5–4.5)
PLATELET # BLD AUTO: 189 K/UL — SIGNIFICANT CHANGE UP (ref 150–400)
POTASSIUM SERPL-MCNC: 3.9 MMOL/L — SIGNIFICANT CHANGE UP (ref 3.5–5.3)
POTASSIUM SERPL-SCNC: 3.9 MMOL/L — SIGNIFICANT CHANGE UP (ref 3.5–5.3)
PROT SERPL-MCNC: 5.1 G/DL — LOW (ref 6–8.3)
RBC # BLD: 2.92 M/UL — LOW (ref 4.2–5.8)
RBC # FLD: 13 % — SIGNIFICANT CHANGE UP (ref 10.3–14.5)
SARS-COV-2 IGG+IGM SERPL QL IA: 0.08 INDEX — SIGNIFICANT CHANGE UP
SARS-COV-2 IGG+IGM SERPL QL IA: 34.4 U/ML — HIGH
SARS-COV-2 IGG+IGM SERPL QL IA: NEGATIVE — SIGNIFICANT CHANGE UP
SARS-COV-2 IGG+IGM SERPL QL IA: POSITIVE
SODIUM SERPL-SCNC: 138 MMOL/L — SIGNIFICANT CHANGE UP (ref 135–145)
SPECIMEN SOURCE: SIGNIFICANT CHANGE UP
TIBC SERPL-MCNC: 144 UG/DL — LOW (ref 220–430)
TRANSFERRIN SERPL-MCNC: 119 MG/DL — LOW (ref 200–360)
UIBC SERPL-MCNC: 114 UG/DL — SIGNIFICANT CHANGE UP (ref 110–370)
WBC # BLD: 5.72 K/UL — SIGNIFICANT CHANGE UP (ref 3.8–10.5)
WBC # FLD AUTO: 5.72 K/UL — SIGNIFICANT CHANGE UP (ref 3.8–10.5)

## 2021-11-15 PROCEDURE — 99222 1ST HOSP IP/OBS MODERATE 55: CPT

## 2021-11-15 PROCEDURE — 99232 SBSQ HOSP IP/OBS MODERATE 35: CPT | Mod: GC

## 2021-11-15 RX ORDER — SENNA PLUS 8.6 MG/1
1 TABLET ORAL
Qty: 0 | Refills: 0 | DISCHARGE
Start: 2021-11-15

## 2021-11-15 RX ORDER — ENOXAPARIN SODIUM 100 MG/ML
40 INJECTION SUBCUTANEOUS
Qty: 0 | Refills: 0 | DISCHARGE
Start: 2021-11-15

## 2021-11-15 RX ORDER — POLYETHYLENE GLYCOL 3350 17 G/17G
17 POWDER, FOR SOLUTION ORAL
Qty: 0 | Refills: 0 | DISCHARGE
Start: 2021-11-15

## 2021-11-15 RX ORDER — AZITHROMYCIN 500 MG/1
500 TABLET, FILM COATED ORAL DAILY
Refills: 0 | Status: COMPLETED | OUTPATIENT
Start: 2021-11-15 | End: 2021-11-17

## 2021-11-15 RX ADMIN — Medication 3 MILLIGRAM(S): at 21:56

## 2021-11-15 RX ADMIN — ENOXAPARIN SODIUM 40 MILLIGRAM(S): 100 INJECTION SUBCUTANEOUS at 10:05

## 2021-11-15 RX ADMIN — AZITHROMYCIN 500 MILLIGRAM(S): 500 TABLET, FILM COATED ORAL at 12:22

## 2021-11-15 NOTE — PROGRESS NOTE ADULT - PROBLEM SELECTOR PLAN 3
Pt has no hx of BPH but on CT imaging shows significant enlargement of prostate and urinary retention. Pt is able to make urine but after void still feels like he has to go. Most likely 2/2 due to stool burden compression causing obstruction w/ prostate enlargement.     Plan:  - Q8 bladder scans Pt has no hx of BPH but on CT imaging shows significant enlargement of prostate and urinary retention. Pt is able to make urine but after void still feels like he has to go. Most likely 2/2 due to stool burden compression causing obstruction w/ prostate enlargement.   Plan:  - Q8 bladder scans Pt has no hx of BPH but on CT imaging shows significant enlargement of prostate and urinary retention. Pt is able to make urine but after void still feels like he has to go. No indication for UTI (no WBC, febrile, burning/pain on urination)  Most likely 2/2 due to stool burden compression causing obstruction w/ prostate enlargement.   Plan:  - Q8 bladder scans

## 2021-11-15 NOTE — DISCHARGE NOTE PROVIDER - NSDCFUADDAPPT_GEN_ALL_CORE_FT
We were unable to schedule an appointment with your Cardiology Provider, Dr. Shoaib Joel. Please call [phone] to schedule an appointment within 2 weeks of your hospital discharge.     Appointment was facilitated by Ms. TRISTAN Guardado, Referral Coordinator.

## 2021-11-15 NOTE — PROGRESS NOTE ADULT - PROBLEM SELECTOR PLAN 2
Pt has had diarrhea for 2 weeks. Denies any sick contacts, mucous/liquid stool --> diarrhea, says that he has 1 episode per day. He is afebrile, no WBC w/ mild epigastric tenderness. CT abd/pelvis shows significant stool burden w/ colitis, no perforation or toxic megacolon.    Plan:  - BM regimen: mineral water enema, senna QD, and miralax BID Likely i/s/o poor PO intake (patient lives alone, has 1-2 meals a day that he orders from outside and does not cook at home, does not have a nursing aide)  Rule out CHF exacerbation (i/s/o unclear cardiac history), unlikely myositis, neurological workup/stroke negative, no hx of hypothyroid and TSH WNL, UA negative no signs of UTI despite urinary retention  CT head negative and CXR WNL  EKG NSR at 75, with no ST elevations/ischemia/infarcts. States that weakness happens more when he is malpositioned while sitting or sleeping. Pt is AOx3, able to recall chronic memories, and appropriate verbalizations.   - B12, folate WNL  - ESR elevated  Plan:  - D/C maintenance fluids  cc/hr for 12 hrs i/s/o unclear cardiac history and presence of bilateral venous stasis changes  - F/u echo  - Encourage PO intake  - PT/OT evaluation Likely i/s/o poor PO intake (patient lives alone, has 1-2 meals a day that he orders from outside and does not cook at home, does not have a nursing aide)  Rule out CHF exacerbation (i/s/o unclear cardiac history), unlikely myositis, neurological workup/stroke negative, no hx of hypothyroid and TSH WNL, UA negative no signs of UTI despite urinary retention  CT head negative and CXR WNL  EKG NSR at 75, with no ST elevations/ischemia/infarcts. States that weakness happens more when he is malpositioned while sitting or sleeping. Pt is AOx3, able to recall chronic memories, and appropriate verbalizations.   - B12, folate WNL  - ESR elevated  - Duplex b/l LE negative for DVT (pt has +2 swelling and erythema on legs)  Plan:  - D/C maintenance fluids  cc/hr for 12 hrs i/s/o unclear cardiac history and presence of bilateral venous stasis changes  - F/u echo  - Encourage PO intake  - PT/OT evaluation

## 2021-11-15 NOTE — CONSULT NOTE ADULT - ATTENDING COMMENTS
#Sigmoid Colitis   Patient presenting with weakness in the setting of diarrhea x 2 weeks, 1-2 episodes daily. CT A/P notable for Sigmoid colitis. No perforation or toxic megacolon. No acute mesenteric thrombus. Large amount of stool in the colon. GI PCR + EPEC. No diverticulosis appreciated on CT imaging, unlikely SCAD without e/o diverticulosis. Sigmoid colitis likely infectious given GI PCR findings. Also cannot exclude stercoral colitis in the setting of significant stool burden. Diarrhea on presentation possibly overflow in nature as well.   -Bowel regimen, including tap water enemas and Miralax daily. Uptitrate PRN  -No role for colonoscopic evaluation at this time  -Can continue with current regimen of Azithromycin 500 mg x 3 days for tx of EPEC  -remainder of supportive management as per primary team

## 2021-11-15 NOTE — DISCHARGE NOTE PROVIDER - HOSPITAL COURSE
#Discharge: do not delete    Patient is 82 yo M w/ unreported PMHx who was BIBA and complained of 2 weeks of diarrhea; being admitted to [facility] for generalized weakness, found to have colitis 2/2 to Enteropathogenic EColi, admitted for further management.    Hospital course (by problem):   #Acute colitis.   Plan: 2/2 to EPEC as detected on GI PCR  - Pt has had progressively worsening diarrhea (1-2 episodes per day) for 2 weeks. Denies any sick contacts, new foods.   - Progressed from initially hard stool -> mucous/liquid stool --> diarrhea.  - He is afebrile, no WBC w/ mild distension and epigastric tenderness.   - CT abd/pelvis shows significant stool burden w/ colitis, no perforation or toxic megacolon.  Plan:  - 500mg azithromycin x3 days (11/15-11/17)  - BM regimen: mineral water enema, senna QD, and miralax BID if needed (avoid if patient has diarrhea).    # General weakness.   Plan: Likely i/s/o poor PO intake (patient lives alone, has 1-2 meals a day that he orders from outside and does not cook at home, does not have a nursing aide)  Rule out CHF exacerbation (i/s/o unclear cardiac history), unlikely myositis, neurological workup/stroke negative, no hx of hypothyroid and TSH WNL, UA negative no signs of UTI despite urinary retention  CT head negative and CXR WNL  EKG NSR at 75, with no ST elevations/ischemia/infarcts. States that weakness happens more when he is malpositioned while sitting or sleeping. Pt is AOx3, able to recall chronic memories, and appropriate verbalizations.   - B12, folate WNL  - ESR elevated  - Duplex b/l LE negative for DVT (pt has +2 swelling and erythema on legs)  Plan:  - D/C maintenance fluids  cc/hr for 12 hrs i/s/o unclear cardiac history and presence of bilateral venous stasis changes  - F/u echo  - Encourage PO intake  - PT/OT evaluation.    #Enlarged prostate with urinary retention.   Plan: Pt has no hx of BPH but on CT imaging shows significant enlargement of prostate and urinary retention. Pt is able to make urine but after void still feels like he has to go. No indication for UTI (no WBC, febrile, burning/pain on urination)  Most likely 2/2 due to stool burden compression causing obstruction w/ prostate enlargement.   Plan:  - Q8 bladder scans.    #Venous stasis dermatitis.   Plan: Pt has had changes to b/l legs for ~6 weeks w/ rash and pitting edema. 3 points on Wells' Score, most likely 2/2 to poor venous compliance and decreased ambulation.  - pro-  - D-Dimer age adjusted   Plan:  - F/u echo  - Fluids D/Cd.    #Anemia.   Plan: Normocytic (MCV 97)  Hgb 9.2 (baseline 10-12, as last hospitalization in 10/2020 was 12)  Iron studies show NICO, low TIBC, low Iron   Plan:  Trend CBC  Maintain active T&S, transfuse hgb <7.    Patient was discharged to: [facility] (home/[facility]/acute rehab/hospice, etc, and with what services – home health PT/RN? Home O2?)    New medications: AZITHROMYCIN  Changes to old medications: NONE  Medications that were stopped: NONE    Items to follow up as outpatient: NONE    Physical exam at the time of discharge:       #Discharge: do not delete    Patient is 80 yo M w/ unreported PMHx who was BIBA and complained of 2 weeks of diarrhea; being admitted to [facility] for generalized weakness, found to have colitis 2/2 to Enteropathogenic EColi, admitted for further management.    Hospital course (by problem):   #Acute colitis.   Plan: 2/2 to EPEC as detected on GI PCR  - Pt has had progressively worsening diarrhea (1-2 episodes per day) for 2 weeks. Denies any sick contacts, new foods.   - Progressed from initially hard stool -> mucous/liquid stool --> diarrhea.  - He is afebrile, no WBC w/ mild distension and epigastric tenderness.   - CT abd/pelvis shows significant stool burden w/ colitis, no perforation or toxic megacolon.  Plan:  - 500mg azithromycin x3 days (11/15-11/17)  - BM regimen: mineral water enema, senna QD, and miralax BID if needed (avoid if patient has diarrhea).    # General weakness.   Plan: Likely i/s/o poor PO intake (patient lives alone, has 1-2 meals a day that he orders from outside and does not cook at home, does not have a nursing aide)  Rule out CHF exacerbation (i/s/o unclear cardiac history), unlikely myositis, neurological workup/stroke negative, no hx of hypothyroid and TSH WNL, UA negative no signs of UTI despite urinary retention  CT head negative and CXR WNL  EKG NSR at 75, with no ST elevations/ischemia/infarcts. States that weakness happens more when he is malpositioned while sitting or sleeping. Pt is AOx3, able to recall chronic memories, and appropriate verbalizations.   - B12, folate WNL  - ESR elevated  - Duplex b/l LE negative for DVT (pt has +2 swelling and erythema on legs)  Plan:  - D/C maintenance fluids  cc/hr for 12 hrs i/s/o unclear cardiac history and presence of bilateral venous stasis changes  - F/u echo  - Encourage PO intake  - PT/OT evaluation.    #Enlarged prostate with urinary retention.   Plan: Pt has no hx of BPH but on CT imaging shows significant enlargement of prostate and urinary retention. Pt is able to make urine but after void still feels like he has to go. No indication for UTI (no WBC, febrile, burning/pain on urination)  Most likely 2/2 due to stool burden compression causing obstruction w/ prostate enlargement.   Plan:  - Q8 bladder scans.    #Venous stasis dermatitis.   Plan: Pt has had changes to b/l legs for ~6 weeks w/ rash and pitting edema. 3 points on Wells' Score, most likely 2/2 to poor venous compliance and decreased ambulation.  - pro-  - D-Dimer age adjusted   Plan:  - F/u echo  - Fluids D/Cd.    #Anemia.   Plan: Normocytic (MCV 97)  Hgb 9.2 (baseline 10-12, as last hospitalization in 10/2020 was 12)  Iron studies show NICO, low TIBC, low Iron   Plan:  Trend CBC  Maintain active T&S, transfuse hgb <7.    Patient was discharged to: [facility] (home/[facility]/acute rehab/hospice, etc, and with what services – home health PT/RN? Home O2?)    New medications: AZITHROMYCIN  Changes to old medications: NONE  Medications that were stopped: NONE    Items to follow up as outpatient: NONE    Physical exam at the time of discharge:  Constitutional: WDWN resting comfortably in bed; NAD  Head: NC/AT  Eyes: PERRL, EOMI, anicteric sclera  ENT: no nasal discharge; uvula midline, no oropharyngeal erythema or exudates; MMM  Neck: supple; no JVD or thyromegaly  Respiratory: CTA B/L; no W/R/R, no retractions  Cardiac: +systolic murmur, +S1/S2; RRR; /R/G; PMI non-displaced  Gastrointestinal: mild distension, mild epigastric tenderness; abdomen soft, no rebound or guarding; +BSx4  Extremities: WWP, no clubbing or cyanosis; 2+ pitting edema   Musculoskeletal: NROM x4; no joint swelling, tenderness or erythema  Vascular: +2 bilateral edema, 2+ radial, DP pulses B/L  Dermatologic: skin warm, dry and intact; generalized seborrheic keratoses; B/L erythematous, scaly papular, nonpurulent rash from ankles to midshin  Neurologic: AAOx3; CNII-XII grossly intact; no focal deficits  Psychiatric: affect and characteristics of appearance, verbalizations, behaviors are appropriate       #Discharge: do not delete    Patient is 80 yo M w/ unreported PMHx who was BIBA and complained of 2 weeks of diarrhea; being admitted to [facility] for generalized weakness, found to have colitis 2/2 to Enteropathogenic EColi, admitted for further management.    Hospital course (by problem):   #Acute colitis.   Plan: 2/2 to EPEC as detected on GI PCR  - Pt has had progressively worsening diarrhea (1-2 episodes per day) for 2 weeks. Denies any sick contacts, new foods.   - Progressed from initially hard stool -> mucous/liquid stool --> diarrhea.  - He is afebrile, no WBC w/ mild distension and epigastric tenderness.   - CT abd/pelvis shows significant stool burden w/ colitis, no perforation or toxic megacolon.  - finished antibiotic course: 500mg azithromycin x3 days (11/15-11/17)  Plan:  - BM regimen: mineral water enema, senna QD, and miralax BID if needed (avoid if patient has diarrhea).    # General weakness.   Plan: Likely i/s/o poor PO intake (patient lives alone, has 1-2 meals a day that he orders from outside and does not cook at home, does not have a nursing aide)  Rule out CHF exacerbation (i/s/o unclear cardiac history), unlikely myositis, neurological workup/stroke negative, no hx of hypothyroid and TSH WNL, UA negative no signs of UTI despite urinary retention  CT head negative and CXR WNL  EKG NSR at 75, with no ST elevations/ischemia/infarcts. States that weakness happens more when he is malpositioned while sitting or sleeping. Pt is AOx3, able to recall chronic memories, and appropriate verbalizations.   - B12, folate WNL  - ESR elevated  - Duplex b/l LE negative for DVT (pt has +2 swelling and erythema on legs)  Plan:  - D/C maintenance fluids  cc/hr for 12 hrs i/s/o unclear cardiac history and presence of bilateral venous stasis changes  - F/u echo  - Encourage PO intake  - PT/OT evaluation.    #Enlarged prostate with urinary retention.   Plan: Pt has no hx of BPH but on CT imaging shows significant enlargement of prostate and urinary retention. Pt is able to make urine but after void still feels like he has to go. No indication for UTI (no WBC, febrile, burning/pain on urination)  Most likely 2/2 due to stool burden compression causing obstruction w/ prostate enlargement.   Plan:  - Q8 bladder scans.    #Venous stasis dermatitis.   Plan: Pt has had changes to b/l legs for ~6 weeks w/ rash and pitting edema. 3 points on Wells' Score, most likely 2/2 to poor venous compliance and decreased ambulation.  - pro-  - D-Dimer age adjusted   Plan:  - F/u echo  - Fluids D/Cd.    #Anemia.   Plan: Normocytic (MCV 97)  Hgb 9.2 (baseline 10-12, as last hospitalization in 10/2020 was 12)  Iron studies show NICO, low TIBC, low Iron   Plan:  Trend CBC  Maintain active T&S, transfuse hgb <7.    Patient was discharged to: [facility]     New medications: none  Changes to old medications: NONE  Medications that were stopped: NONE    Items to follow up as outpatient: NONE    Physical exam at the time of discharge:  Constitutional: WDWN resting comfortably in bed; NAD  Head: NC/AT  Eyes: PERRL, EOMI, anicteric sclera  ENT: no nasal discharge; uvula midline, no oropharyngeal erythema or exudates; MMM  Neck: supple; no JVD or thyromegaly  Respiratory: CTA B/L; no W/R/R, no retractions  Cardiac: +systolic murmur, +S1/S2; RRR; /R/G; PMI non-displaced  Gastrointestinal: mild distension, mild epigastric tenderness; abdomen soft, no rebound or guarding; +BSx4  Extremities: WWP, no clubbing or cyanosis; 2+ pitting edema   Musculoskeletal: NROM x4; no joint swelling, tenderness or erythema  Vascular: +2 bilateral edema, 2+ radial, DP pulses B/L  Dermatologic: skin warm, dry and intact; generalized seborrheic keratoses; B/L erythematous, scaly papular, nonpurulent rash from ankles to midshin  Neurologic: AAOx3; CNII-XII grossly intact; no focal deficits  Psychiatric: affect and characteristics of appearance, verbalizations, behaviors are appropriate       #Discharge: do not delete    Patient is 80 yo M w/ unreported PMHx who was BIBA and complained of 2 weeks of diarrhea; being admitted to [facility] for generalized weakness, found to have colitis 2/2 to Enteropathogenic EColi, admitted for further management.    Hospital course (by problem):   #Acute colitis.   2/2 to EPEC as detected on GI PCR. Pt afebrile w/ no WBC elevation. CT abd/pelvis shows significant stool burden w/ colitis, no perforation or toxic megacolon.  Plan:  - s/p 500mg azithromycin x3 days (11/15-11/17)  - pt reports some diarrhea on 11/18 and 11/19, per nursing staff stool is not diarrhea, low c/f acute gi pathology at this point given afebrile, no AP, no n/v, non toxic appearance; dc'ed bowel regimen out of caution  - pt stable for discharge.    #General weakness.   Likely i/s/o poor PO intake (patient lives alone, has 1-2 meals a day that he orders from outside and does not cook at home, does not have a nursing aide)  - PT evaluation: home with home PT   - APS case was opened, family says they will ensure patient gets adequate food and assistance at home     #Enlarged prostate with urinary retention.   Pt has no hx of BPH but on CT imaging shows significant enlargement of prostate and urinary retention. Pt is able to make urine but after void still feels like he has to go. No indication for UTI (no WBC, febrile, burning/pain on urination)  - Pt voiding independently via condom catheter.    #Venous stasis dermatitis.   Pt has had changes to b/l legs for ~6 weeks w/ rash and pitting edema. 3 points on Wells' Score, most likely 2/2 to poor venous compliance and decreased ambulation.  - LE doppler negative for DVT.    #Anemia.   2/2 iron deficiency anemia. Hgb stable on CBC.  -outpatient follow up     Patient was discharged to: home     New medications: NONE  Changes to old medications: NONE  Medications that were stopped: NONE    Items to follow up as outpatient: NONE    Physical exam at the time of discharge:  General: elderly man lying in bed in nad   HEENT: NC/AT; EOMI, PERRLA, anicteric sclera; moist mucosal membranes.  Neck: supple, trachea midline  Cardiovascular: RRR, +S1/S2; NO M/R/G  Respiratory: CTA B/L; no W/R/R  Gastrointestinal: soft, NT/ND; +BSx4  Extremities: venous stasis; WWP; no edema or cyanosis  Vascular: 2+ radial, DP/PT pulses B/L  Neurological: AAOx3; no focal deficits

## 2021-11-15 NOTE — PROGRESS NOTE ADULT - PROBLEM SELECTOR PLAN 5
F: tolerating PO, s/p 1L NS bolus; c/w maintenance fluids  E: replete K<4, Mg<2  N: Regular     VTE Prophylaxis: Lovenox 40 Q24H  GI: not needed  C: Full Code  D: RMF Normocytic (MCV 97)  Hgb 9.2 (baseline 10-12, as last hospitalization in 10/2020 was 12)  Iron studies show NICO, low TIBC, low Iron   Plan:  Trend CBC  Maintain active T&S, transfuse hgb <7

## 2021-11-15 NOTE — DISCHARGE NOTE PROVIDER - CARE PROVIDER_API CALL
Shoaib Joel)  Cardiovascular Disease; Internal Medicine  [address]  [place] [address]  Phone: [phone]  Fax: [phone]  Established Patient  Follow Up Time: 2 weeks

## 2021-11-15 NOTE — PROGRESS NOTE ADULT - SUBJECTIVE AND OBJECTIVE BOX
OVERNIGHT EVENTS:    SUBJECTIVE:  Patient seen and examined at bedside.    Vital Signs Last 12 Hrs  T(F): 98.5 (21 @ 20:36), Max: 98.5 (21 @ 20:36)  HR: 80 (21 @ 20:36) (80 - 80)  BP: 144/73 (21 @ 20:36) (144/73 - 144/73)  BP(mean): --  RR: 17 (21 @ 20:36) (17 - 17)  SpO2: 95% (21 @ 20:36) (95% - 95%)  I&O's Summary    2021 07:01  -  15 Nov 2021 06:48  --------------------------------------------------------  IN: 1800 mL / OUT: 900 mL / NET: 900 mL      PHYSICAL EXAM:  Constitutional: NAD, comfortable in bed.  HEENT: NC/AT, PERRLA, EOMI, no conjunctival pallor or scleral icterus, MMM  Neck: Supple, no JVD  Respiratory: CTA B/L. No w/r/r.   Cardiovascular: RRR, normal S1 and S2, no m/r/g.   Gastrointestinal: +BS, soft NTND, no guarding or rebound tenderness, no palpable masses   Extremities: wwp; no cyanosis, clubbing or edema.   Vascular: Pulses equal and strong throughout.   Neurological: AAOx3, no CN deficits, strength and sensation intact throughout.   Skin: No gross skin abnormalities or rashes    LABS:                        9.2    5.72  )-----------( 189      ( 15 Nov 2021 05:22 )             28.5     11-15    138  |  107  |  16  ----------------------------<  120<H>  3.9   |  23  |  1.12    Ca    8.4      15 Nov 2021 05:22  Phos  3.1     11-15  Mg     1.7     11-15    TPro  5.1<L>  /  Alb  2.5<L>  /  TBili  0.2  /  DBili  x   /  AST  32  /  ALT  22  /  AlkPhos  49  11-15      Urinalysis Basic - ( 2021 07:16 )    Color: Yellow / Appearance: Clear / S.010 / pH: x  Gluc: x / Ketone: NEGATIVE  / Bili: Negative / Urobili: 1.0 E.U./dL   Blood: x / Protein: NEGATIVE mg/dL / Nitrite: NEGATIVE   Leuk Esterase: NEGATIVE / RBC: x / WBC x   Sq Epi: x / Non Sq Epi: x / Bacteria: x      RADIOLOGY & ADDITIONAL TESTS:    MEDICATIONS  (STANDING):  enoxaparin Injectable 40 milliGRAM(s) SubCutaneous every 24 hours  influenza  Vaccine (HIGH DOSE) 0.7 milliLiter(s) IntraMuscular once  polyethylene glycol 3350 17 Gram(s) Oral every 12 hours  senna 1 Tablet(s) Oral daily  sodium chloride 0.9%. 1000 milliLiter(s) (150 mL/Hr) IV Continuous <Continuous>    MEDICATIONS  (PRN):  acetaminophen     Tablet .. 650 milliGRAM(s) Oral every 6 hours PRN Temp greater or equal to 38C (100.4F), Mild Pain (1 - 3)  melatonin 3 milliGRAM(s) Oral at bedtime PRN Insomnia   OVERNIGHT EVENTS: KAVITA    SUBJECTIVE:  Patient seen and examined at bedside. Resting comfortably, conversant, no acute complaints. Patient had one episode of watery diarrhea overnight, but denies fevers, chills, abdominal pain, chest pain, shortness of breath, nausea, vomiting.    Vital Signs Last 12 Hrs  T(F): 98.5 (21 @ 20:36), Max: 98.5 (21 @ 20:36)  HR: 80 (21 @ 20:36) (80 - 80)  BP: 144/73 (21 @ 20:36) (144/73 - 144/73)  BP(mean): --  RR: 17 (21 @ 20:36) (17 - 17)  SpO2: 95% (21 @ 20:36) (95% - 95%)  I&O's Summary    2021 07:01  -  15 Nov 2021 06:48  --------------------------------------------------------  IN: 1800 mL / OUT: 900 mL / NET: 900 mL      PHYSICAL EXAM:  Constitutional: WDWN resting comfortably in bed; NAD  Head: NC/AT  Eyes: PERRL, EOMI, anicteric sclera  ENT: no nasal discharge; uvula midline, no oropharyngeal erythema or exudates; MMM  Neck: supple; no JVD or thyromegaly  Respiratory: CTA B/L; no W/R/R, no retractions  Cardiac: +systolic murmur, +S1/S2; RRR; /R/G; PMI non-displaced  Gastrointestinal: mild distension, mild epigastric tenderness; abdomen soft, no rebound or guarding; +BSx4  Extremities: WWP, no clubbing or cyanosis; 2+ pitting edema   Musculoskeletal: NROM x4; no joint swelling, tenderness or erythema  Vascular: +2 bilateral edema, 2+ radial, DP pulses B/L  Dermatologic: skin warm, dry and intact; generalized seborrheic keratoses; B/L erythematous, scaly papular, nonpurulent rash from ankles to midshin  Neurologic: AAOx3; CNII-XII grossly intact; no focal deficits  Psychiatric: affect and characteristics of appearance, verbalizations, behaviors are appropriate    LABS:                        9.2    5.72  )-----------( 189      ( 15 Nov 2021 05:22 )             28.5     11-15    138  |  107  |  16  ----------------------------<  120<H>  3.9   |  23  |  1.12    Ca    8.4      15 Nov 2021 05:22  Phos  3.1     11-15  Mg     1.7     11-15    TPro  5.1<L>  /  Alb  2.5<L>  /  TBili  0.2  /  DBili  x   /  AST  32  /  ALT  22  /  AlkPhos  49  11-15      Urinalysis Basic - ( 2021 07:16 )    Color: Yellow / Appearance: Clear / S.010 / pH: x  Gluc: x / Ketone: NEGATIVE  / Bili: Negative / Urobili: 1.0 E.U./dL   Blood: x / Protein: NEGATIVE mg/dL / Nitrite: NEGATIVE   Leuk Esterase: NEGATIVE / RBC: x / WBC x   Sq Epi: x / Non Sq Epi: x / Bacteria: x      RADIOLOGY & ADDITIONAL TESTS:    MEDICATIONS  (STANDING):  enoxaparin Injectable 40 milliGRAM(s) SubCutaneous every 24 hours  influenza  Vaccine (HIGH DOSE) 0.7 milliLiter(s) IntraMuscular once  polyethylene glycol 3350 17 Gram(s) Oral every 12 hours  senna 1 Tablet(s) Oral daily  sodium chloride 0.9%. 1000 milliLiter(s) (150 mL/Hr) IV Continuous <Continuous>    MEDICATIONS  (PRN):  acetaminophen     Tablet .. 650 milliGRAM(s) Oral every 6 hours PRN Temp greater or equal to 38C (100.4F), Mild Pain (1 - 3)  melatonin 3 milliGRAM(s) Oral at bedtime PRN Insomnia

## 2021-11-15 NOTE — CONSULT NOTE ADULT - SUBJECTIVE AND OBJECTIVE BOX
HPI:  This is an 82 yo M w/ unreported PMHx who was BIBA for weakness. He mentions that he has had diarrhea for the past 2 weeks, no f/c/n/v. He attributes the changes in his bowel from limiting the amount of food he eats - two meals per day or one big dinner. He denies being around any sick contacts who changes in foods that he has eaten in the past. He denies any falls, that he sat down and wasn't feeling well and called his night super. The night super came to check-in on him and said that he should call EMS. Per signout from ED Provider, EMS mentioned that the living arrangements were not ideal - that it was hard for them to get into the apartment as there were signs of hoarding.     ED Course  Vitals: 98.3F, HR 75, 165/86, RR 16, SpO2 98% on RA   Labs: Hgb 10.9, Hct 33.2, UA neg, CMP wnl   EKG: NSR at 75 bpm, no ischemia or infarcts  Imaging: CT Abd/Pelvis: sigmoid colitis. No perforation or toxic megacolon. No acute mesenteric thrombus. Large amount of stool in the colon. CT Head: no acute pathology, no significant changes compared to 10/2020. CT cervical spine: no acute fx or malalignment. CXR: nml, no cardiomegaly.   Interventions: Augmentin 875 mg, 1L NS bolus  (14 Nov 2021 07:49)    GI consulted for colitis and concern for SCAD.    Allergies    No Known Allergies    Intolerances      MEDICATIONS:  MEDICATIONS  (STANDING):  azithromycin   Tablet 500 milliGRAM(s) Oral daily  enoxaparin Injectable 40 milliGRAM(s) SubCutaneous every 24 hours  influenza  Vaccine (HIGH DOSE) 0.7 milliLiter(s) IntraMuscular once  polyethylene glycol 3350 17 Gram(s) Oral every 12 hours  senna 1 Tablet(s) Oral daily    MEDICATIONS  (PRN):  acetaminophen     Tablet .. 650 milliGRAM(s) Oral every 6 hours PRN Temp greater or equal to 38C (100.4F), Mild Pain (1 - 3)  melatonin 3 milliGRAM(s) Oral at bedtime PRN Insomnia    PAST MEDICAL & SURGICAL HISTORY:  Fall    Degenerative disc disease, cervical  w/ anterolisthesis    History of facial surgery  10/2020 after fall causing facial lacerations      FAMILY HISTORY:    SOCIAL HISTORY:  [place]: [ ] Current, [ ] Former, [ ] Never; Pack Years:  Alcohol:  Illicit Drugs:    REVIEW OF SYSTEMS:  Constitutional: No fever, chills, weight loss, or fatigue  ENMT:  No visual changes; No difficulty hearing, tinnitus, vertigo; No sinus or throat pain  Neck: No pain or stiffness  Respiratory: No cough, wheezing, or hemoptysis; No shortness of breath  Cardiovascular: No chest pain, palpitations, dizziness, orthopnea, PND, or leg swelling  Gastrointestinal: No abdominal or epigastric pain. No  nausea, vomiting, or hematemesis. No diarrhea, constipation, or steatorrhea. No melena or hematochezia  Genitourinary: No dysuria, urinary frequency/hesitancy, or hematuria  Skin: No itching, burning, rashes or lesions   Musculoskeletal: No joint pain or swelling; No muscle, back or extremity pain    Vital Signs Last 24 Hrs  T(C): 36.9 (15 Nov 2021 14:35), Max: 36.9 (15 Nov 2021 14:35)  T(F): 98.5 (15 Nov 2021 14:35), Max: 98.5 (15 Nov 2021 14:35)  HR: 72 (15 Nov 2021 14:35) (72 - 72)  BP: 137/68 (15 Nov 2021 14:35) (137/68 - 137/68)  BP(mean): --  RR: 18 (15 Nov 2021 14:35) (18 - 18)  SpO2: 95% (15 Nov 2021 14:35) (95% - 95%)    11-14 @ 07:01  -  11-15 @ 07:00  --------------------------------------------------------  IN: 1800 mL / OUT: 900 mL / NET: 900 mL    11-15 @ 07:01  -  11-15 @ 21:38  --------------------------------------------------------  IN: 0 mL / OUT: 600 mL / NET: -600 mL        PHYSICAL EXAM:    General: Well developed; well nourished elderly male; in no acute distress; lying comfortably in bed  HEENT: MMM, conjunctiva and sclera clear  Gastrointestinal: Soft, non-tender non-distended; Normal bowel sounds; No rebound or guarding  Extremities: Normal range of motion, No clubbing, cyanosis or edema  Neurological: Alert and oriented x3  Skin: Warm and dry. No obvious rash    LABS:                        9.2    5.72  )-----------( 189      ( 15 Nov 2021 05:22 )             28.5     11-15    138  |  107  |  16  ----------------------------<  120<H>  3.9   |  23  |  1.12    Ca    8.4      15 Nov 2021 05:22  Phos  3.1     11-15  Mg     1.7     11-15    TPro  5.1<L>  /  Alb  2.5<L>  /  TBili  0.2  /  DBili  x   /  AST  32  /  ALT  22  /  AlkPhos  49  11-15        RADIOLOGY & ADDITIONAL STUDIES:   
  Patient is a 81y old  Male who presents with a chief complaint of Weakness (15 Nov 2021 06:47)       HPI:  This is an 82 yo M w/ unreported PMHx who was BIBA for weakness. He mentions that he has had diarrhea for the past 2 weeks, no f/c/n/v. He attributes the changes in his bowel from limiting the amount of food he eats - two meals per day or one big dinner. He denies being around any sick contacts who changes in foods that he has eaten in the past. He denies any falls, that he sat down and wasn't feeling well and called his night super. The night super came to check-in on him and said that he should call EMS. Per signout from ED Provider, EMS mentioned that the living arrangements were not ideal - that it was hard for them to get into the apartment as there were signs of hoarding.     ED Course  Vitals: 98.3F, HR 75, 165/86, RR 16, SpO2 98% on RA   Labs: Hgb 10.9, Hct 33.2, UA neg, CMP wnl   EKG: NSR at 75 bpm, no ischemia or infarcts  Imaging: CT Abd/Pelvis: sigmoid colitis. No perforation or toxic megacolon. No acute mesenteric thrombus. Large amount of stool in the colon. CT Head: no acute pathology, no significant changes compared to 10/2020. CT cervical spine: no acute fx or malalignment. CXR: nml, no cardiomegaly.   Interventions: Augmentin 875 mg, 1L NS bolus  (2021 07:49)      PAST MEDICAL & SURGICAL HISTORY:  Fall    Degenerative disc disease, cervical  w/ anterolisthesis    History of facial surgery  10/2020 after fall causing facial lacerations        MEDICATIONS  (STANDING):  azithromycin   Tablet 500 milliGRAM(s) Oral daily  enoxaparin Injectable 40 milliGRAM(s) SubCutaneous every 24 hours  influenza  Vaccine (HIGH DOSE) 0.7 milliLiter(s) IntraMuscular once  polyethylene glycol 3350 17 Gram(s) Oral every 12 hours  senna 1 Tablet(s) Oral daily    MEDICATIONS  (PRN):  acetaminophen     Tablet .. 650 milliGRAM(s) Oral every 6 hours PRN Temp greater or equal to 38C (100.4F), Mild Pain (1 - 3)  melatonin 3 milliGRAM(s) Oral at bedtime PRN Insomnia      FAMILY HISTORY:      CBC Full  -  ( 15 Nov 2021 05:22 )  WBC Count : 5.72 K/uL  RBC Count : 2.92 M/uL  Hemoglobin : 9.2 g/dL  Hematocrit : 28.5 %  Platelet Count - Automated : 189 K/uL  Mean Cell Volume : 97.6 fl  Mean Cell Hemoglobin : 31.5 pg  Mean Cell Hemoglobin Concentration : 32.3 gm/dL  Auto Neutrophil # : x  Auto Lymphocyte # : x  Auto Monocyte # : x  Auto Eosinophil # : x  Auto Basophil # : x  Auto Neutrophil % : x  Auto Lymphocyte % : x  Auto Monocyte % : x  Auto Eosinophil % : x  Auto Basophil % : x      11-15    138  |  107  |  16  ----------------------------<  120<H>  3.9   |  23  |  1.12    Ca    8.4      15 Nov 2021 05:22  Phos  3.1     11-15  Mg     1.7     11-15    TPro  5.1<L>  /  Alb  2.5<L>  /  TBili  0.2  /  DBili  x   /  AST  32  /  ALT  22  /  AlkPhos  49  11-15      Urinalysis Basic - ( 2021 07:16 )    Color: Yellow / Appearance: Clear / S.010 / pH: x  Gluc: x / Ketone: NEGATIVE  / Bili: Negative / Urobili: 1.0 E.U./dL   Blood: x / Protein: NEGATIVE mg/dL / Nitrite: NEGATIVE   Leuk Esterase: NEGATIVE / RBC: x / WBC x   Sq Epi: x / Non Sq Epi: x / Bacteria: x          Radiology:    < from: Xray Chest 1 View- PORTABLE-Urgent (Xray Chest 1 View- PORTABLE-Urgent .) (21 @ 04:31) >  EXAM:  XR CHEST PORTABLE URGENT 1V                          PROCEDURE DATE:  2021          INTERPRETATION:  Clinical History: Pain    Frontal examination of the chest demonstrates the heart to be within normal limits in transverse diameter. No acute infiltrates. Visualized osseous structures are within normal limits.    IMPRESSION: No acute infiltrates        < from: CT Head No Cont (21 @ 05:59) >  EXAM:  CT BRAIN                          PROCEDURE DATE:  2021          INTERPRETATION:  *******************OFFICIAL ATTENDING REPORT*******************    CLINICAL INDICATION: Fall.    TECHNIQUE: CT of the head was performed without the administration of intravenous contrast.    COMPARISON: CT head 10/1/2020.    FINDINGS:    There is no evidence of acute infarction, intracranial hemorrhage or mass lesion.    There is hypoattenuation of the subcortical and periventricular white matter, which is nonspecific finding, but most likely represents sequela of chronic microvascular ischemic disease. There are atherosclerotic calcifications of the cavernous and supraclinoid internal carotid arteries bilaterally. There is prominence of the cortical sulci related to underlying brain parenchymal volume loss.    There is no evidence of obstructive hydrocephalus. There are no extra-axial fluid collections. Incidentally noted is left-sided retrocerebellar arachnoid cyst.    The visualized intraorbital contents are normal. There are polyps/mucous retention cysts in the bilateral maxillary sinuses. The mastoid air cells are clear. The visualized soft tissues and osseous structures appear unremarkable.      IMPRESSION:    No acute intracranial findings.      < from: CT Cervical Spine No Cont (21 @ 05:59) >  EXAM:  CT CERVICAL SPINE                          PROCEDURE DATE:  2021          INTERPRETATION:  *******************OFFICIAL ATTENDING REPORT*******************    CLINICAL INDICATION: Status post fall.    TECHNIQUE: CT of the cervical spinewas performed without the administration of intravenous contrast, according to standard protocol.    COMPARISON: CT cervical spine 10/1/2020.    FINDINGS:    ALIGNMENT: Straightening with reversal of the expected cervical lordosis. There is minimal grade 1 anterolisthesis of C4 on C5 and grade 1 anterolisthesis of C7 on T1.    VERTEBRAE: The vertebral bodies are normal in height. There is no fracture or aggressive osseous lesion.    Note, there is a small sclerotic focus in the left anterior ringof C1, which may represent bone island.    DISCS: There is multilevel degenerative loss of intervertebral disc space height, most pronounced at C5-C6 and C6-C7 level.    PARAVERTEBRAL SOFT TISSUES: No CT evidence of acute traumatic injury to the visualized paravertebral soft tissues.    EVALUATION OF INDIVIDUAL LEVELS DEMONSTRATES:  C2-3: No spinal canal or neuroforaminal stenosis.    C3-4: Degenerative changes of the facet and uncovertebral joints resulting in moderate right and mild left neuroforaminal narrowing. No canal stenosis.    C4-5: Grade 1 anterolisthesis of C4 on C5. Disc bulge and degenerative changes of the facet and uncovertebral joints resulting in minimal canal stenosis and moderate neuroforaminal narrowing bilaterally.    C5-6: Disc bulge and degenerative changes of the facet and uncovertebral joints resulting in moderate right and mild left neuroforaminal narrowing. No canal stenosis.    C6-7: Degenerative changes of the facet and uncovertebral joints resulting in mild neuroforaminal narrowing bilaterally. No canal stenosis.    C7-T1: Grade 1 anterolisthesis of C7 on T1. No spinal canal or neuroforaminal stenosis.    IMPRESSION:    No CT evidence of acute traumatic injury to the cervical spine.    MRI may be obtained, if further information regarding ligamentous injury, hematoma or spinal cord pathology is required.    -----------------------------------------------------------------------------------      CERVICAL SPINE CT WITHOUT CONTRAST dated 2021 5:59AM    CLINICAL STATEMENT: Fall    TECHNIQUE: Contiguous noncontrast transaxial CT images were obtained through the cervical spine. Reconstructions were then created in the axial, sagittal, and coronal planes.    COMPARISON: CT cervical spine from 10/1/2020.    FINDINGS:    Dextrocurvature and reversal of the cervical lordosis. There is 3 mm anterolisthesis of C7 on T1 without facet subluxation. No acute fracture is identified. The vertebral body heights are grossly maintained. Multilevel disc space narrowing, most prominent at the levels of C5-C7. No prevertebral soft tissue swelling is demonstrated.    No large disc herniations are seen. There is no significant bony canal stenosis. Multilevel neural foraminal narrowing, most prominent at thelevels of C3-C4 on the right, C4-C5 on the left.    IMPRESSION:    No acute fracture or malalignment.        < from: CT Abdomen and Pelvis w/ Oral Cont and w/ IV Cont (21 @ 06:03) >  EXAM:  CT ABDOMEN AND PELVIS OC IC                          PROCEDURE DATE:  2021          INTERPRETATION:  CLINICAL INFORMATION: Diarrhea    COMPARISON: None.    PROCEDURE:  CT of the Abdomen and Pelvis was performed with intravenous contrast.  Intravenous contrast: 90 ml Omnipaque 350. 10 ml discarded.  Oral contrast: positive contrast was administered.  Sagittal and coronal reformats were performed.    FINDINGS:  LOWER CHEST: Within normal limits.    LIVER: Multiple cysts.  BILE DUCTS:Normal caliber.  GALLBLADDER: Within normal limits.  SPLEEN: Within normal limits.  PANCREAS: 1 cm cystic lesion at the head, likely sidebranch IPMN.  ADRENALS: Within normal limits.  KIDNEYS/URETERS: Small cysts.    BLADDER: Milk of calcium.  REPRODUCTIVE ORGANS: Prostate is enlarged.    BOWEL: No bowel obstruction. Appendix is normal. Large amount of stool in the colon with redundancy of the sigmoid. Thickened sigmoid colon with surrounding fat stranding consistent with colitis. Small hiatal hernia.  PERITONEUM: No ascites. No pneumoperitoneum. No abscess.  VESSELS: Atherosclerotic changes. Mesenteric arteries patent.  RETROPERITONEUM/LYMPH NODES: No lymphadenopathy.  ABDOMINAL WALL: Within normal limits.  BONES: Within normal limits.    IMPRESSION:  Sigmoid colitis. No perforation or toxic megacolon. No acute mesenteric thrombus. Large amount of stool in the colon.        Vital Signs Last 24 Hrs  T(C): 36.9 (2021 20:36), Max: 36.9 (2021 20:36)  T(F): 98.5 (2021 20:36), Max: 98.5 (2021 20:36)  HR: 80 (2021 20:36) (78 - 80)  BP: 144/73 (2021 20:36) (142/68 - 144/73)  BP(mean): --  RR: 17 (2021 20:36) (16 - 17)  SpO2: 95% (2021 20:36) (95% - 97%)        REVIEW OF SYSTEMS:    CONSTITUTIONAL: No fever, weight loss, or fatigue  EYES: No eye pain, visual disturbances, or discharge  ENMT:  No difficulty hearing, tinnitus, vertigo; No sinus or throat pain  NECK: No pain or stiffness  BREASTS: No pain, masses, or nipple discharge  RESPIRATORY: No cough, wheezing, chills or hemoptysis; No shortness of breath  CARDIOVASCULAR: No chest pain, palpitations, dizziness, or leg swelling  GASTROINTESTINAL: as per HPI  GENITOURINARY: No dysuria, frequency, hematuria, or incontinence  NEUROLOGICAL: No headaches, memory loss, loss of strength, numbness, or tremors  SKIN: No itching, burning, rashes, or lesions   LYMPH NODES: No enlarged glands  ENDOCRINE: No heat or cold intolerance; No hair loss  MUSCULOSKELETAL: No joint pain or swelling; No muscle, back, or extremity pain  PSYCHIATRIC: No depression, anxiety, mood swings, or difficulty sleeping  HEME/LYMPH: No easy bruising, or bleeding gums  ALLERGY AND IMMUNOLOGIC: No hives or eczema  VASCULAR: no swelling, erythema,           Physical Exam:   82 yo  gentleman lying in semi Bee's position, awake, alert, no acute complaints    Head: normocephalic, atraumatic    Eyes: PERRLA, EOMI, no nystagmus, sclera anicteric    ENT: nasal discharge, uvula midline, no oropharyngeal erythema/exudate    Neck: supple, negative JVD, negative carotid bruits, no thyromegaly    Chest: CTA bilaterally, neg wheeze/ rhonchi/ rales/ crackles/ egophany    Cardiovascular: regular rate and rhythm, neg murmurs/rubs/gallops    Abdomen: soft, non distended, non tender to palpation in all 4 quadrants, negative rebound/guarding, normal bowel sounds    Extremities: 2 + LE edema to shins with erythema, seborrheic dermatitis, TTP    Neurologic Exam:    Alert and oriented x 3     Motor Exam:    Right UE:             no focal weakness, > 3+/5 throughout    Left UE:                no focal weakness, > 3+/5 throughout      Right LE:             no focal weakness, > 3+/5 throughout    Left LE:                no focal weakness, > 3+/5 throughout               Sensation:           intact to light touch x 4 extremities                                                  DTR:                  biceps/brachioradialis: equal                                                   patella/ankle: equal                                                      neg clonus                           neg Babinski                      Gait:  not tested        PM&R Impression:    1) deconditioned  2) no focal weakness    Recommendations/ Plan :    1) Physical therapy focusing on therapeutic exercises, bed mobility/transfer out of bed evaluation, progressive ambulation with assistive devices prn.    2) Anticipated Disposition Plan/Recs:    subacute rehab placement

## 2021-11-15 NOTE — CONSULT NOTE ADULT - ASSESSMENT
per Internal Medicine    80 yo M w/ unreported PMHx who was BIBA and complained of 2 weeks of diarrhea; being admitted to [facility] for generalized weakness, awaiting placement in [facility]/rehab.     Problem/Plan - 1:  ·  Problem: General weakness.   ·  Plan: CT head and CXR nml. EKG NSR at 75, with no ST elevations/ischemia/infarcts. 2 weeks of progressive weakness w/ associated diarrhea. States that weakness happens more when he is malpositioned while sitting or sleeping. Pt is AOx3, able to recall chronic memories, and appropriate verbalizations.     Plan:  - C/w maintenance fluids  cc/hr for 12 hrs  - Encourage PO intake  - PT/OT evaluation  - F/u B12, folate, TSH, free T4.    Problem/Plan - 2:  ·  Problem: Diarrhea.   ·  Plan: Pt has had diarrhea for 2 weeks. Denies any sick contacts, mucous/liquid stool --> diarrhea, says that he has 1 episode per day. He is afebrile, no WBC w/ mild epigastric tenderness. CT abd/pelvis shows significant stool burden w/ colitis, no perforation or toxic megacolon.    Plan:  - BM regimen: mineral water enema, senna QD, and miralax BID.    Problem/Plan - 3:  ·  Problem: Enlarged prostate with urinary retention.   ·  Plan: Pt has no hx of BPH but on CT imaging shows significant enlargement of prostate and urinary retention. Pt is able to make urine but after void still feels like he has to go. Most likely 2/2 due to stool burden compression causing obstruction w/ prostate enlargement.     Plan:  - Q8 bladder scans.    Problem/Plan - 4:  ·  Problem: Venous stasis dermatitis.   ·  Plan: Pt has had changes to b/l legs for ~6 weeks w/ rash and pitting edema. 3 points on Wells' Score, most likely 2/2 to poor venous compliance and decreased ambulation.    Plan:  - F/u d-dimer   - F/u pro-BNP.    Problem/Plan - 5:  ·  Problem: Prophylactic measure.   ·  Plan: F: tolerating PO, s/p 1L NS bolus; c/w maintenance fluids  E: replete K<4, Mg<2  N: Regular     VTE Prophylaxis: Lovenox 40 Q24H  GI: not needed  C: Full Code  D: RMF.  
82 yo M w/ unreported PMHx who was BIBA for weakness. He mentions that he has had diarrhea for the past 2 weeks, CT A/P notable for sigmoid colitis, GI consulted for concern for SCAD.    #Sigmoid Colitis   Patient presenting with weakness in the setting of diarrhea x 2 weeks, 1-2 episodes daily. CT A/P notable for Sigmoid colitis. No perforation or toxic megacolon. No acute mesenteric thrombus. Large amount of stool in the colon. GI PCR + EPEC. No diverticulosis appreciated on CT imaging, unlikely SCAD without e/o diverticulosis. Sigmoid colitis likely infectious given GI PCR findings. Also cannot exclude stercoral colitis in the setting of significant stool burden. Diarrhea on presentation possibly overflow in nature as well.   -Bowel regimen, including tap water enemas and Miralax daily. Uptitrate PRN  -No role for colonoscopic evaluation at this time  -Can continue with current regimen of Azithromycin 500 mg x 3 days for tx of EPEC  -remainder of supportive management as per primary team    Case discussed with c attending and primary team.     Thank you for allowing us to participate in the care of this patient.  GI will sign off. Please call back with any questions or concerns.     Caridad Sanchez DO  Gastroenterology Fellow  Pager: [phone]

## 2021-11-15 NOTE — PROGRESS NOTE ADULT - PROBLEM SELECTOR PLAN 1
CT head and CXR nml. EKG NSR at 75, with no ST elevations/ischemia/infarcts. 2 weeks of progressive weakness w/ associated diarrhea. States that weakness happens more when he is malpositioned while sitting or sleeping. Pt is AOx3, able to recall chronic memories, and appropriate verbalizations.     Plan:  - C/w maintenance fluids  cc/hr for 12 hrs  - Encourage PO intake  - PT/OT evaluation  - F/u B12, folate, TSH, free T4 2/2 to EPEC as detected on GI PCR  - Pt has had progressively worsening diarrhea (1-2 episodes per day) for 2 weeks. Denies any sick contacts, new foods.   - Progressed from initially hard stool -> mucous/liquid stool --> diarrhea.  - He is afebrile, no WBC w/ mild distension and epigastric tenderness.   - CT abd/pelvis shows significant stool burden w/ colitis, no perforation or toxic megacolon.  Plan:  - 1g azithromycin  - BM regimen: mineral water enema, senna QD, and miralax BID 2/2 to EPEC as detected on GI PCR  - Pt has had progressively worsening diarrhea (1-2 episodes per day) for 2 weeks. Denies any sick contacts, new foods.   - Progressed from initially hard stool -> mucous/liquid stool --> diarrhea.  - He is afebrile, no WBC w/ mild distension and epigastric tenderness.   - CT abd/pelvis shows significant stool burden w/ colitis, no perforation or toxic megacolon.  Plan:  - 500mg azithromycin x3 days (11/15-11/17)  - BM regimen: mineral water enema, senna QD, and miralax BID 2/2 to EPEC as detected on GI PCR  - Pt has had progressively worsening diarrhea (1-2 episodes per day) for 2 weeks. Denies any sick contacts, new foods.   - Progressed from initially hard stool -> mucous/liquid stool --> diarrhea.  - He is afebrile, no WBC w/ mild distension and epigastric tenderness.   - CT abd/pelvis shows significant stool burden w/ colitis, no perforation or toxic megacolon.  Plan:  - 500mg azithromycin x3 days (11/15-11/17)  - BM regimen: mineral water enema, senna QD, and miralax BID if needed (avoid if patient has diarrhea)

## 2021-11-15 NOTE — DISCHARGE NOTE PROVIDER - NSDCCPCAREPLAN_GEN_ALL_CORE_FT
PRINCIPAL DISCHARGE DIAGNOSIS  Diagnosis: Diarrhea  Assessment and Plan of Treatment: You were admitted to the hospital for colitis after presenting with two weeks of diarrhea. Colitis is a chronic digestive disease characterized by inflammation of the inner lining of the colon. Infection, loss of blood supply in the colon, Inflammatory Bowel Disease (IBD) and invasion of the colon wall with collagen or lymphocytic white blood cells are all possible causes of an inflamed colon. You recieved a short course of antibiotic called AZITHROMYCIN 500MG (3 days total ending NOVEMBER 17, 2021). PLEASE CONTINUE THE FULL COURSE OF ANTIBIOTIC.   If you begin to experience any fevers, chills, severe abdominal pain, nausea, vomiting, constipation, diarrhea, weakness or any other symptoms, please contact your primary care doctor or return to the emergency department immediately.       PRINCIPAL DISCHARGE DIAGNOSIS  Diagnosis: Diarrhea  Assessment and Plan of Treatment: You were admitted to the hospital for colitis after presenting with two weeks of diarrhea. Colitis is a chronic digestive disease characterized by inflammation of the inner lining of the colon. Infection, loss of blood supply in the colon, Inflammatory Bowel Disease (IBD) and invasion of the colon wall with collagen or lymphocytic white blood cells are all possible causes of an inflamed colon. You finished a short course of antibiotic called AZITHROMYCIN 500MG (3 days total ending NOVEMBER 17, 2021).   If you begin to experience any fevers, chills, severe abdominal pain, nausea, vomiting, constipation, diarrhea, weakness or any other symptoms, please contact your primary care doctor or return to the emergency department immediately. Please follow up with your primary care doctor for further care.      SECONDARY DISCHARGE DIAGNOSES  Diagnosis: General weakness  Assessment and Plan of Treatment: You have general weakness. We believe that your weakness is due to poor intake of food and water. Please drink enough water (8 cups per days) and eat enough food (eat breakfast, lunch, and dinner). Please follow up with your outpatient doctor for further management.

## 2021-11-15 NOTE — DISCHARGE NOTE PROVIDER - NSDCMRMEDTOKEN_GEN_ALL_CORE_FT
enoxaparin: 40 milligram(s) subcutaneous once a day (at bedtime)  polyethylene glycol 3350 oral powder for reconstitution: 17 gram(s) orally every 12 hours  senna oral tablet: 1 tab(s) orally once a day   Rolling walker for gait instability R53.01:    Cane for gait instability R53.01:   Rolling walker for gait instability R53.01:

## 2021-11-15 NOTE — PROGRESS NOTE ADULT - PROBLEM SELECTOR PLAN 4
Pt has had changes to b/l legs for ~6 weeks w/ rash and pitting edema. 3 points on Wells' Score, most likely 2/2 to poor venous compliance and decreased ambulation.    Plan:  - F/u d-dimer   - F/u pro-BNP Pt has had changes to b/l legs for ~6 weeks w/ rash and pitting edema. 3 points on Wells' Score, most likely 2/2 to poor venous compliance and decreased ambulation.  - pro-  - D-Dimer age adjusted   Plan:  - F/u echo  - Fluids D/Cd

## 2021-11-15 NOTE — PROGRESS NOTE ADULT - ASSESSMENT
This is an 82 yo M w/ unreported PMHx who was BIBA and complained of 2 weeks of diarrhea; being admitted to [facility] for generalized weakness, awaiting placement in [facility]/rehab.  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

## 2021-11-16 ENCOUNTER — TRANSCRIPTION ENCOUNTER (OUTPATIENT)
Age: 81
End: 2021-11-16

## 2021-11-16 PROBLEM — Z00.00 ENCOUNTER FOR PREVENTIVE HEALTH EXAMINATION: Status: ACTIVE | Noted: 2021-11-16

## 2021-11-16 LAB
ALBUMIN SERPL ELPH-MCNC: 3.1 G/DL — LOW (ref 3.3–5)
ALP SERPL-CCNC: 46 U/L — SIGNIFICANT CHANGE UP (ref 40–120)
ALT FLD-CCNC: 25 U/L — SIGNIFICANT CHANGE UP (ref 10–45)
ANION GAP SERPL CALC-SCNC: 6 MMOL/L — SIGNIFICANT CHANGE UP (ref 5–17)
ANISOCYTOSIS BLD QL: SLIGHT — SIGNIFICANT CHANGE UP
AST SERPL-CCNC: 30 U/L — SIGNIFICANT CHANGE UP (ref 10–40)
BASOPHILS # BLD AUTO: 0.06 K/UL — SIGNIFICANT CHANGE UP (ref 0–0.2)
BASOPHILS NFR BLD AUTO: 0.9 % — SIGNIFICANT CHANGE UP (ref 0–2)
BILIRUB SERPL-MCNC: 0.3 MG/DL — SIGNIFICANT CHANGE UP (ref 0.2–1.2)
BLD GP AB SCN SERPL QL: NEGATIVE — SIGNIFICANT CHANGE UP
BUN SERPL-MCNC: 21 MG/DL — SIGNIFICANT CHANGE UP (ref 7–23)
BURR CELLS BLD QL SMEAR: PRESENT — SIGNIFICANT CHANGE UP
CALCIUM SERPL-MCNC: 9 MG/DL — SIGNIFICANT CHANGE UP (ref 8.4–10.5)
CHLORIDE SERPL-SCNC: 106 MMOL/L — SIGNIFICANT CHANGE UP (ref 96–108)
CO2 SERPL-SCNC: 27 MMOL/L — SIGNIFICANT CHANGE UP (ref 22–31)
CREAT SERPL-MCNC: 1.03 MG/DL — SIGNIFICANT CHANGE UP (ref 0.5–1.3)
EOSINOPHIL # BLD AUTO: 0.18 K/UL — SIGNIFICANT CHANGE UP (ref 0–0.5)
EOSINOPHIL NFR BLD AUTO: 2.6 % — SIGNIFICANT CHANGE UP (ref 0–6)
GIANT PLATELETS BLD QL SMEAR: PRESENT — SIGNIFICANT CHANGE UP
GLUCOSE SERPL-MCNC: 96 MG/DL — SIGNIFICANT CHANGE UP (ref 70–99)
HCT VFR BLD CALC: 31.7 % — LOW (ref 39–50)
HGB BLD-MCNC: 10 G/DL — LOW (ref 13–17)
LYMPHOCYTES # BLD AUTO: 0.84 K/UL — LOW (ref 1–3.3)
LYMPHOCYTES # BLD AUTO: 12.3 % — LOW (ref 13–44)
MACROCYTES BLD QL: SLIGHT — SIGNIFICANT CHANGE UP
MAGNESIUM SERPL-MCNC: 1.8 MG/DL — SIGNIFICANT CHANGE UP (ref 1.6–2.6)
MANUAL SMEAR VERIFICATION: SIGNIFICANT CHANGE UP
MCHC RBC-ENTMCNC: 31.3 PG — SIGNIFICANT CHANGE UP (ref 27–34)
MCHC RBC-ENTMCNC: 31.5 GM/DL — LOW (ref 32–36)
MCV RBC AUTO: 99.4 FL — SIGNIFICANT CHANGE UP (ref 80–100)
MICROCYTES BLD QL: SLIGHT — SIGNIFICANT CHANGE UP
MONOCYTES # BLD AUTO: 0.36 K/UL — SIGNIFICANT CHANGE UP (ref 0–0.9)
MONOCYTES NFR BLD AUTO: 5.3 % — SIGNIFICANT CHANGE UP (ref 2–14)
NEUTROPHILS # BLD AUTO: 5.4 K/UL — SIGNIFICANT CHANGE UP (ref 1.8–7.4)
NEUTROPHILS NFR BLD AUTO: 78.9 % — HIGH (ref 43–77)
OVALOCYTES BLD QL SMEAR: SLIGHT — SIGNIFICANT CHANGE UP
PHOSPHATE SERPL-MCNC: 3.7 MG/DL — SIGNIFICANT CHANGE UP (ref 2.5–4.5)
PLAT MORPH BLD: ABNORMAL
PLATELET # BLD AUTO: 208 K/UL — SIGNIFICANT CHANGE UP (ref 150–400)
POLYCHROMASIA BLD QL SMEAR: SLIGHT — SIGNIFICANT CHANGE UP
POTASSIUM SERPL-MCNC: 5.2 MMOL/L — SIGNIFICANT CHANGE UP (ref 3.5–5.3)
POTASSIUM SERPL-SCNC: 5.2 MMOL/L — SIGNIFICANT CHANGE UP (ref 3.5–5.3)
PROT SERPL-MCNC: 5.7 G/DL — LOW (ref 6–8.3)
RBC # BLD: 3.19 M/UL — LOW (ref 4.2–5.8)
RBC # FLD: 13.2 % — SIGNIFICANT CHANGE UP (ref 10.3–14.5)
RBC BLD AUTO: ABNORMAL
RH IG SCN BLD-IMP: POSITIVE — SIGNIFICANT CHANGE UP
SODIUM SERPL-SCNC: 139 MMOL/L — SIGNIFICANT CHANGE UP (ref 135–145)
SPHEROCYTES BLD QL SMEAR: SLIGHT — SIGNIFICANT CHANGE UP
WBC # BLD: 6.85 K/UL — SIGNIFICANT CHANGE UP (ref 3.8–10.5)
WBC # FLD AUTO: 6.85 K/UL — SIGNIFICANT CHANGE UP (ref 3.8–10.5)

## 2021-11-16 PROCEDURE — 93306 TTE W/DOPPLER COMPLETE: CPT | Mod: 26

## 2021-11-16 PROCEDURE — 99239 HOSP IP/OBS DSCHRG MGMT >30: CPT

## 2021-11-16 RX ORDER — AZITHROMYCIN 500 MG/1
1 TABLET, FILM COATED ORAL
Qty: 1 | Refills: 0
Start: 2021-11-16 | End: 2021-11-16

## 2021-11-16 RX ORDER — MAGNESIUM SULFATE 500 MG/ML
2 VIAL (ML) INJECTION ONCE
Refills: 0 | Status: COMPLETED | OUTPATIENT
Start: 2021-11-16 | End: 2021-11-16

## 2021-11-16 RX ADMIN — Medication 50 GRAM(S): at 12:19

## 2021-11-16 RX ADMIN — POLYETHYLENE GLYCOL 3350 17 GRAM(S): 17 POWDER, FOR SOLUTION ORAL at 17:11

## 2021-11-16 RX ADMIN — POLYETHYLENE GLYCOL 3350 17 GRAM(S): 17 POWDER, FOR SOLUTION ORAL at 06:45

## 2021-11-16 RX ADMIN — SENNA PLUS 1 TABLET(S): 8.6 TABLET ORAL at 12:20

## 2021-11-16 RX ADMIN — AZITHROMYCIN 500 MILLIGRAM(S): 500 TABLET, FILM COATED ORAL at 12:20

## 2021-11-16 NOTE — OCCUPATIONAL THERAPY INITIAL EVALUATION ADULT - DIAGNOSIS, OT EVAL
Pt's functional mobility/ADL performance is limited by decreased balance with intermittent c/o dizziness during standing/ambulation, decreased postural control, and decreased LUE strength.

## 2021-11-16 NOTE — PROGRESS NOTE ADULT - SUBJECTIVE AND OBJECTIVE BOX
OVERNIGHT EVENTS:    SUBJECTIVE:  Patient seen and examined at bedside.    Vital Signs Last 12 Hrs  T(F): 98 (11-16-21 @ 05:26), Max: 98.4 (11-15-21 @ 21:00)  HR: 69 (11-16-21 @ 05:26) (68 - 69)  BP: 159/69 (11-16-21 @ 05:26) (142/71 - 159/69)  BP(mean): --  RR: 17 (11-16-21 @ 05:26) (16 - 17)  SpO2: 96% (11-16-21 @ 05:26) (95% - 96%)  I&O's Summary    15 Nov 2021 07:01  -  16 Nov 2021 07:00  --------------------------------------------------------  IN: 0 mL / OUT: 600 mL / NET: -600 mL    PHYSICAL EXAM:  Constitutional: NAD, comfortable in bed.  HEENT: NC/AT, PERRLA, EOMI, no conjunctival pallor or scleral icterus, MMM  Neck: Supple, no JVD  Respiratory: CTA B/L. No w/r/r.   Cardiovascular: RRR, normal S1 and S2, no m/r/g.   Gastrointestinal: +BS, soft NTND, no guarding or rebound tenderness, no palpable masses   Extremities: wwp; no cyanosis, clubbing or edema.   Vascular: Pulses equal and strong throughout.   Neurological: AAOx3, no CN deficits, strength and sensation intact throughout.   Skin: No gross skin abnormalities or rashes        LABS:                        9.2    5.72  )-----------( 189      ( 15 Nov 2021 05:22 )             28.5     11-15    138  |  107  |  16  ----------------------------<  120<H>  3.9   |  23  |  1.12    Ca    8.4      15 Nov 2021 05:22  Phos  3.1     11-15  Mg     1.7     11-15    TPro  5.1<L>  /  Alb  2.5<L>  /  TBili  0.2  /  DBili  x   /  AST  32  /  ALT  22  /  AlkPhos  49  11-15            RADIOLOGY & ADDITIONAL TESTS:    MEDICATIONS  (STANDING):  azithromycin   Tablet 500 milliGRAM(s) Oral daily  enoxaparin Injectable 40 milliGRAM(s) SubCutaneous every 24 hours  influenza  Vaccine (HIGH DOSE) 0.7 milliLiter(s) IntraMuscular once  polyethylene glycol 3350 17 Gram(s) Oral every 12 hours  senna 1 Tablet(s) Oral daily    MEDICATIONS  (PRN):  acetaminophen     Tablet .. 650 milliGRAM(s) Oral every 6 hours PRN Temp greater or equal to 38C (100.4F), Mild Pain (1 - 3)  melatonin 3 milliGRAM(s) Oral at bedtime PRN Insomnia   OVERNIGHT EVENTS: KAVITA. Condom cath placed draining 50ccs clear yellow urine    SUBJECTIVE:  Patient seen and examined at bedside. No acute complaints, abdomen still mildly distended and mildly tender but no complaints.    Vital Signs Last 12 Hrs  T(F): 98 (11-16-21 @ 05:26), Max: 98.4 (11-15-21 @ 21:00)  HR: 69 (11-16-21 @ 05:26) (68 - 69)  BP: 159/69 (11-16-21 @ 05:26) (142/71 - 159/69)  BP(mean): --  RR: 17 (11-16-21 @ 05:26) (16 - 17)  SpO2: 96% (11-16-21 @ 05:26) (95% - 96%)  I&O's Summary    15 Nov 2021 07:01  -  16 Nov 2021 07:00  --------------------------------------------------------  IN: 0 mL / OUT: 600 mL / NET: -600 mL    PHYSICAL EXAM:  Constitutional: WDWN resting comfortably in bed; NAD  Head: NC/AT  Eyes: PERRL, EOMI, anicteric sclera  ENT: no nasal discharge; uvula midline, no oropharyngeal erythema or exudates; MMM  Neck: supple; no JVD or thyromegaly  Respiratory: CTA B/L; no W/R/R, no retractions  Cardiac: +systolic murmur, +S1/S2; RRR; /R/G; PMI non-displaced  Gastrointestinal: mild distension, mild epigastric tenderness; abdomen soft, no rebound or guarding; +BSx4  Extremities: WWP, no clubbing or cyanosis; 2+ pitting edema   Musculoskeletal: NROM x4; no joint swelling, tenderness or erythema  Vascular: +2 bilateral edema, 2+ radial, DP pulses B/L  Dermatologic: skin warm, dry and intact; generalized seborrheic keratoses; B/L erythematous, scaly papular, nonpurulent rash from ankles to midshin  Neurologic: AAOx3; CNII-XII grossly intact; no focal deficits  Psychiatric: affect and characteristics of appearance, verbalizations, behaviors are appropriate      LABS:                        9.2    5.72  )-----------( 189      ( 15 Nov 2021 05:22 )             28.5     11-15    138  |  107  |  16  ----------------------------<  120<H>  3.9   |  23  |  1.12    Ca    8.4      15 Nov 2021 05:22  Phos  3.1     11-15  Mg     1.7     11-15    TPro  5.1<L>  /  Alb  2.5<L>  /  TBili  0.2  /  DBili  x   /  AST  32  /  ALT  22  /  AlkPhos  49  11-15      RADIOLOGY & ADDITIONAL TESTS:    MEDICATIONS  (STANDING):  azithromycin   Tablet 500 milliGRAM(s) Oral daily  enoxaparin Injectable 40 milliGRAM(s) SubCutaneous every 24 hours  influenza  Vaccine (HIGH DOSE) 0.7 milliLiter(s) IntraMuscular once  polyethylene glycol 3350 17 Gram(s) Oral every 12 hours  senna 1 Tablet(s) Oral daily    MEDICATIONS  (PRN):  acetaminophen     Tablet .. 650 milliGRAM(s) Oral every 6 hours PRN Temp greater or equal to 38C (100.4F), Mild Pain (1 - 3)  melatonin 3 milliGRAM(s) Oral at bedtime PRN Insomnia

## 2021-11-16 NOTE — PROGRESS NOTE ADULT - ASSESSMENT
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

## 2021-11-16 NOTE — OCCUPATIONAL THERAPY INITIAL EVALUATION ADULT - ADAPTIVE EQUIPMENT NEEDED
19    Kristen Arita MD  [facility]   [address]  [facility] [address]     Dear Dr. Arita,     We are writing to report lab results on your patient, Johanna Dowell ( 2007).  The results were obtained on 2019, but not all results were available at the time of our original letter.  New results are highlighted in bold.  Our interpretation follows below.     Resulted Orders   CBC with platelets differential   Result Value Ref Range    WBC 5.7 4.0 - 11.0 10e9/L    RBC Count 4.75 3.7 - 5.3 10e12/L    Hemoglobin 13.0 11.7 - 15.7 g/dL    Hematocrit 39.3 35.0 - 47.0 %    MCV 83 77 - 100 fl    MCH 27.4 26.5 - 33.0 pg    MCHC 33.1 31.5 - 36.5 g/dL    RDW 12.8 10.0 - 15.0 %    Platelet Count 310 150 - 450 10e9/L    Diff Method Automated Method     % Neutrophils 46.3 %    % Lymphocytes 41.4 %    % Monocytes 7.2 %    % Eosinophils 4.6 %    % Basophils 0.5 %    % Immature Granulocytes 0.0 %    Nucleated RBCs 0 0 /100    Absolute Neutrophil 2.6 1.3 - 7.0 10e9/L    Absolute Lymphocytes 2.4 1.0 - 5.8 10e9/L    Absolute Monocytes 0.4 0.0 - 1.3 10e9/L    Absolute Eosinophils 0.3 0.0 - 0.7 10e9/L    Absolute Basophils 0.0 0.0 - 0.2 10e9/L    Abs Immature Granulocytes 0.0 0 - 0.4 10e9/L    Absolute Nucleated RBC 0.0    ALT   Result Value Ref Range    ALT 17 0 - 50 U/L   Creatinine   Result Value Ref Range    Creatinine 0.58 0.39 - 0.73 mg/dL    GFR Estimate GFR not calculated, patient <18 years old. >60 mL/min/[1.73_m2]      Comment:      Non  GFR Calc  Starting 2018, serum creatinine based estimated GFR (eGFR) will be   calculated using the Chronic Kidney Disease Epidemiology Collaboration   (CKD-EPI) equation.      GFR Estimate If Black GFR not calculated, patient <18 years old. >60 mL/min/[1.73_m2]      Comment:       GFR Calc  Starting 2018, serum creatinine based estimated GFR (eGFR) will be   calculated using the Chronic Kidney Disease  Epidemiology Collaboration   (CKD-EPI) equation.     Routine UA with micro reflex to culture   Result Value Ref Range    Color Urine Yellow     Appearance Urine Clear     Glucose Urine Negative NEG^Negative mg/dL    Bilirubin Urine Negative NEG^Negative    Ketones Urine Negative NEG^Negative mg/dL    Specific Gravity Urine 1.014 1.003 - 1.035    Blood Urine Negative NEG^Negative    pH Urine 8.0 (H) 5.0 - 7.0 pH    Protein Albumin Urine Negative NEG^Negative mg/dL    Urobilinogen mg/dL Normal 0.0 - 2.0 mg/dL    Nitrite Urine Negative NEG^Negative    Leukocyte Esterase Urine Negative NEG^Negative    Source Midstream Urine     WBC Urine 1 0 - 5 /HPF    RBC Urine <1 0 - 2 /HPF    Squamous Epithelial /HPF Urine 4 (H) 0 - 1 /HPF    Mucous Urine Present (A) NEG^Negative /LPF   TSH   Result Value Ref Range    TSH 3.17 0.40 - 4.00 mU/L   Thyroxine, Free   Result Value Ref Range    T4 Free 0.94 0.76 - 1.46 ng/dL   Triiodothronine (T3)   Result Value Ref Range    Triiodothyronine (T3) 138 83 - 213 ng/dL   Anti thyroglobulin antibody   Result Value Ref Range    Thyroglobulin Antibody 372 (H) <40 IU/mL   Thyroid stimulating immunoglobulin   Result Value Ref Range    Thyroid Stim Immunog 2.7 (H) <=1.3 TSI index      Comment:      (Note)  Test Performed by:  [place] [address]       With positive thyroid peroxidase antibody, thyroglobulin antibody, and now thyroid stimulating antibody her consultation with endocrinology (scheduled 9/5/19) will be important going forward.  As noted above, her thyroid function remains normal. No changes to our pediatric rheumatology follow up plan.      Thank you for allowing us to participate in Johanna's care.  If there are new questions or concerns, please do not hesitate to contact us.    Sincerely yours,    Jarett Jordan MD, PhD   Pediatric Rheumatology Fellow  [phone] Office  [phone] Fax  [phone] [facility]  Scheduling  [phone] For urgent needs, a pediatric rheumatologist is on call 24/7      CC  Patient Care Team:  Kristen Arita MD as PCP - General (Pediatrics)  Angela Messina (Optometry)  Jarett White MD as MD (Orthopaedic Surgery)      Johanna Dowell  [address] [facility] [address]           no

## 2021-11-16 NOTE — OCCUPATIONAL THERAPY INITIAL EVALUATION ADULT - GENERAL OBSERVATIONS, REHAB EVAL
Pt cleared for OT by CATHY Remy. Pt received semi supine in bed eating breakfast, NAD, +heplock. Pt A&Ox4 and tolerated session well. Pt cleared for OT by CATHY Remy. Pt received semi supine in bed eating breakfast, NAD, +heplock, +[place] cath. Pt A&Ox4 and tolerated session well.

## 2021-11-16 NOTE — DISCHARGE NOTE NURSING/CASE MANAGEMENT/SOCIAL WORK - PATIENT PORTAL LINK FT
You can access the FollowMyHealth Patient Portal offered by [facility] by registering at the following website: [email or web address]. By joining [employer]’s FollowMyHealth portal, you will also be able to view your health information using other applications (apps) compatible with our system.

## 2021-11-16 NOTE — OCCUPATIONAL THERAPY INITIAL EVALUATION ADULT - ADDITIONAL COMMENTS
Pt lived alone in an apt with elevator access, no CANDELARIA. Pt performed ADLs independently with no use of AE/AD. Pt lived alone in an apt with elevator access, no CANDELARIA. Pt performed ADLs/functional mobility independently with no use of AE/AD.

## 2021-11-16 NOTE — PROGRESS NOTE ADULT - PROBLEM SELECTOR PLAN 2
Likely i/s/o poor PO intake (patient lives alone, has 1-2 meals a day that he orders from outside and does not cook at home, does not have a nursing aide)  Rule out CHF exacerbation (i/s/o unclear cardiac history), unlikely myositis, neurological workup/stroke negative, no hx of hypothyroid and TSH WNL, UA negative no signs of UTI despite urinary retention  CT head negative and CXR WNL  EKG NSR at 75, with no ST elevations/ischemia/infarcts. States that weakness happens more when he is malpositioned while sitting or sleeping. Pt is AOx3, able to recall chronic memories, and appropriate verbalizations.   - B12, folate WNL  - ESR elevated  - Duplex b/l LE negative for DVT (pt has +2 swelling and erythema on legs)  Plan:  - D/C maintenance fluids  cc/hr for 12 hrs i/s/o unclear cardiac history and presence of bilateral venous stasis changes  - F/u echo  - Encourage PO intake  - PT/OT evaluation Likely i/s/o poor PO intake (patient lives alone, has 1-2 meals a day that he orders from outside and does not cook at home, does not have a nursing aide)  Rule out CHF exacerbation (i/s/o unclear cardiac history), unlikely myositis, neurological workup/stroke negative, no hx of hypothyroid and TSH WNL, UA negative no signs of UTI despite urinary retention  CT head negative and CXR WNL  EKG NSR at 75, with no ST elevations/ischemia/infarcts. States that weakness happens more when he is malpositioned while sitting or sleeping. Pt is AOx3, able to recall chronic memories, and appropriate verbalizations.   - B12, folate WNL  - ESR elevated  - Duplex b/l LE negative for DVT (pt has +2 swelling and erythema on legs)  - Fluids D/C'd  Plan:  - F/u echo  - Encourage PO intake  - PT/OT evaluation  - Dispo to [facility] Likely i/s/o poor PO intake (patient lives alone, has 1-2 meals a day that he orders from outside and does not cook at home, does not have a nursing aide)  Rule out CHF exacerbation (i/s/o unclear cardiac history), unlikely myositis, neurological workup/stroke negative, no hx of hypothyroid and TSH WNL, UA negative no signs of UTI despite urinary retention  CT head negative and CXR WNL  EKG NSR at 75, with no ST elevations/ischemia/infarcts. States that weakness happens more when he is malpositioned while sitting or sleeping. Pt is AOx3, able to recall chronic memories, and appropriate verbalizations.   - B12, folate WNL  - ESR elevated  - Duplex b/l LE negative for DVT (pt has +2 swelling and erythema on legs)  - Fluids D/C'd  Plan:  - F/u echo  - Encourage PO intake  - PT/OT evaluation: Dispo to [facility]

## 2021-11-16 NOTE — OCCUPATIONAL THERAPY INITIAL EVALUATION ADULT - PERTINENT HX OF CURRENT PROBLEM, REHAB EVAL
82 yo M w/ unreported PMHx who was BIBA for weakness. He mentions that he has had diarrhea for the past 2 weeks, no f/c/n/v. Admitted to [facility] for generalized weakness, awaiting placement in [facility]/rehab.

## 2021-11-16 NOTE — DISCHARGE NOTE NURSING/CASE MANAGEMENT/SOCIAL WORK - NSDCVIVACCINE_GEN_ALL_CORE_FT
Tdap; 01-Oct-2020 14:27; Gabbie Jay (RN); [employer]; [id number] (Exp. Date: 09-Jul-2022); IntraMuscular; Deltoid Left.; 0.5 milliLiter(s); VIS (VIS Published: 09-May-2013, VIS Presented: 01-Oct-2020);

## 2021-11-16 NOTE — OCCUPATIONAL THERAPY INITIAL EVALUATION ADULT - MODALITIES TREATMENT COMMENTS
Pt able to ambulate to bathroom with CGA using RW, demo decreased step length and intermittent c/o dizziness with accompanied decreased balance.

## 2021-11-16 NOTE — PROGRESS NOTE ADULT - PROBLEM SELECTOR PLAN 5
Normocytic (MCV 97)  Hgb 9.2 (baseline 10-12, as last hospitalization in 10/2020 was 12)  Iron studies show NICO, low TIBC, low Iron   Plan:  Trend CBC  Maintain active T&S, transfuse hgb <7

## 2021-11-16 NOTE — PROGRESS NOTE ADULT - PROBLEM SELECTOR PLAN 1
2/2 to EPEC as detected on GI PCR  - Pt has had progressively worsening diarrhea (1-2 episodes per day) for 2 weeks. Denies any sick contacts, new foods.   - Progressed from initially hard stool -> mucous/liquid stool --> diarrhea.  - He is afebrile, no WBC w/ mild distension and epigastric tenderness.   - CT abd/pelvis shows significant stool burden w/ colitis, no perforation or toxic megacolon.  Plan:  - 500mg azithromycin x3 days (11/15-11/17)  - BM regimen: mineral water enema, senna QD, and miralax BID if needed (avoid if patient has diarrhea) 2/2 to EPEC as detected on GI PCR  - Pt p/w progressively worsening diarrhea (1-2 episodes per day) for 2 weeks. Denies any sick contacts, new foods.   - Progressed from initially hard stool -> mucous/liquid stool --> diarrhea.  - afebrile, no WBC w/ mild distension and epigastric tenderness.   - CT abd/pelvis shows significant stool burden w/ colitis, no perforation or toxic megacolon.  Plan:  - 500mg azithromycin x3 days (11/15-11/17)  - GI consulted, recs appreciated, agrees with above plan  - BM regimen: mineral water enema, senna QD, and miralax BID if needed (avoid if patient has diarrhea)

## 2021-11-16 NOTE — PROGRESS NOTE ADULT - PROBLEM SELECTOR PLAN 3
Pt has no hx of BPH but on CT imaging shows significant enlargement of prostate and urinary retention. Pt is able to make urine but after void still feels like he has to go. No indication for UTI (no WBC, febrile, burning/pain on urination)  Most likely 2/2 due to stool burden compression causing obstruction w/ prostate enlargement.   Plan:  - Q8 bladder scans Pt has no hx of BPH but on CT imaging shows significant enlargement of prostate and urinary retention. Pt is able to make urine but after void still feels like he has to go. No indication for UTI (no WBC, febrile, burning/pain on urination)  Most likely 2/2 due to stool burden compression causing obstruction w/ prostate enlargement.   Plan:  - Q8 bladder scans  - condom cath draining 50ccs

## 2021-11-16 NOTE — PROGRESS NOTE ADULT - PROBLEM SELECTOR PLAN 4
Pt has had changes to b/l legs for ~6 weeks w/ rash and pitting edema. 3 points on Wells' Score, most likely 2/2 to poor venous compliance and decreased ambulation.  - pro-  - D-Dimer age adjusted   Plan:  - F/u echo  - Fluids D/Cd

## 2021-11-16 NOTE — DISCHARGE NOTE NURSING/CASE MANAGEMENT/SOCIAL WORK - NSDCFUADDAPPT_GEN_ALL_CORE_FT
We were unable to schedule an appointment with your Cardiology Provider, Dr. Shoaib Joel. Please call [phone] to schedule an appointment within 2 weeks of your hospital discharge.     Appointment was facilitated by Ms. TRISTAN Guardado, Referral Coordinator.

## 2021-11-17 LAB
APPEARANCE UR: ABNORMAL
BACTERIA # UR AUTO: PRESENT /HPF
BILIRUB UR-MCNC: NEGATIVE — SIGNIFICANT CHANGE UP
COLOR SPEC: YELLOW — SIGNIFICANT CHANGE UP
DIFF PNL FLD: ABNORMAL
EPI CELLS # UR: SIGNIFICANT CHANGE UP /HPF (ref 0–5)
GLUCOSE UR QL: NEGATIVE — SIGNIFICANT CHANGE UP
KETONES UR-MCNC: NEGATIVE — SIGNIFICANT CHANGE UP
LEUKOCYTE ESTERASE UR-ACNC: NEGATIVE — SIGNIFICANT CHANGE UP
NITRITE UR-MCNC: POSITIVE
PH UR: 7 — SIGNIFICANT CHANGE UP (ref 5–8)
PROT UR-MCNC: NEGATIVE MG/DL — SIGNIFICANT CHANGE UP
RBC CASTS # UR COMP ASSIST: < 5 /HPF — SIGNIFICANT CHANGE UP
SP GR SPEC: 1.01 — SIGNIFICANT CHANGE UP (ref 1–1.03)
UROBILINOGEN FLD QL: 0.2 E.U./DL — SIGNIFICANT CHANGE UP
WBC UR QL: < 5 /HPF — SIGNIFICANT CHANGE UP

## 2021-11-17 PROCEDURE — 99232 SBSQ HOSP IP/OBS MODERATE 35: CPT | Mod: GC

## 2021-11-17 RX ADMIN — ENOXAPARIN SODIUM 40 MILLIGRAM(S): 100 INJECTION SUBCUTANEOUS at 09:01

## 2021-11-17 RX ADMIN — SENNA PLUS 1 TABLET(S): 8.6 TABLET ORAL at 11:02

## 2021-11-17 RX ADMIN — AZITHROMYCIN 500 MILLIGRAM(S): 500 TABLET, FILM COATED ORAL at 11:02

## 2021-11-17 NOTE — PROGRESS NOTE ADULT - SUBJECTIVE AND OBJECTIVE BOX
Physical Medicine and Rehabilitation Progress Note:    Patient is a 81y old  Male who presents with a chief complaint of Weakness (17 Nov 2021 11:47)      HPI:  This is an 82 yo M w/ unreported PMHx who was BIBA for weakness. He mentions that he has had diarrhea for the past 2 weeks, no f/c/n/v. He attributes the changes in his bowel from limiting the amount of food he eats - two meals per day or one big dinner. He denies being around any sick contacts who changes in foods that he has eaten in the past. He denies any falls, that he sat down and wasn't feeling well and called his night super. The night super came to check-in on him and said that he should call EMS. Per signout from ED Provider, EMS mentioned that the living arrangements were not ideal - that it was hard for them to get into the apartment as there were signs of hoarding.     ED Course  Vitals: 98.3F, HR 75, 165/86, RR 16, SpO2 98% on RA   Labs: Hgb 10.9, Hct 33.2, UA neg, CMP wnl   EKG: NSR at 75 bpm, no ischemia or infarcts  Imaging: CT Abd/Pelvis: sigmoid colitis. No perforation or toxic megacolon. No acute mesenteric thrombus. Large amount of stool in the colon. CT Head: no acute pathology, no significant changes compared to 10/2020. CT cervical spine: no acute fx or malalignment. CXR: nml, no cardiomegaly.   Interventions: Augmentin 875 mg, 1L NS bolus  (14 Nov 2021 07:49)                            10.0   6.85  )-----------( 208      ( 16 Nov 2021 08:15 )             31.7       11-16    139  |  106  |  21  ----------------------------<  96  5.2   |  27  |  1.03    Ca    9.0      16 Nov 2021 08:15  Phos  3.7     11-16  Mg     1.8     11-16    TPro  5.7<L>  /  Alb  3.1<L>  /  TBili  0.3  /  DBili  x   /  AST  30  /  ALT  25  /  AlkPhos  46  11-16    Vital Signs Last 24 Hrs  T(C): 36.9 (17 Nov 2021 05:27), Max: 36.9 (16 Nov 2021 21:10)  T(F): 98.5 (17 Nov 2021 05:27), Max: 98.5 (16 Nov 2021 21:10)  HR: 76 (17 Nov 2021 05:27) (76 - 83)  BP: 150/68 (17 Nov 2021 05:27) (150/68 - 158/68)  BP(mean): --  RR: 20 (17 Nov 2021 05:27) (18 - 20)  SpO2: 96% (17 Nov 2021 05:27) (96% - 98%)    MEDICATIONS  (STANDING):  enoxaparin Injectable 40 milliGRAM(s) SubCutaneous every 24 hours  influenza  Vaccine (HIGH DOSE) 0.7 milliLiter(s) IntraMuscular once  polyethylene glycol 3350 17 Gram(s) Oral every 12 hours  senna 1 Tablet(s) Oral daily    MEDICATIONS  (PRN):  acetaminophen     Tablet .. 650 milliGRAM(s) Oral every 6 hours PRN Temp greater or equal to 38C (100.4F), Mild Pain (1 - 3)  melatonin 3 milliGRAM(s) Oral at bedtime PRN Insomnia    Currently Undergoing Physical/ Occupational Therapy at bedside.    Functional Status Assessment:   11/16/2021        Cognitive/Perceptual/Neuro  Cognitive/Neuro/Behavioral [WDL Definition: Alert; opens eyes spontaneously; arouses to voice or touch; oriented x 4; follows commands; speech spontaneous, logical; purposeful motor response; behavior appropriate to situation]: WDL    Language Assistance  Preferred Language to Address Healthcare Preferred Language to Address Healthcare: English    Therapeutic Interventions      Bed Mobility  Bed Mobility Training Rolling/Turning: supervision;  1 person assist;  verbal cues;  nonverbal cues (demo/gestures);  bed rails  Bed Mobility Training Scooting: supervision;  1 person assist;  nonverbal cues (demo/gestures);  verbal cues;  bed rails  Bed Mobility Training Sit-to-Supine: 1 person assist;  nonverbal cues (demo/gestures);  verbal cues;  contact guard;  bed rails  Bed Mobility Training Supine-to-Sit: supervision;  1 person assist;  nonverbal cues (demo/gestures);  verbal cues;  bed rails  Bed Mobility Training Limitations: impaired balance;  decreased strength;  impaired postural control;  impaired ability to control trunk for mobility;  decreased ability to use legs for bridging/pushing    Sit-Stand Transfer Training  Transfer Training Sit-to-Stand Transfer: contact guard;  1 person assist;  nonverbal cues (demo/gestures);  verbal cues;  full weight-bearing   rolling walker  Transfer Training Stand-to-Sit Transfer: contact guard;  1 person assist;  nonverbal cues (demo/gestures);  verbal cues;  full weight-bearing   rolling walker  Sit-to-Stand Transfer Training Transfer Safety Analysis: decreased balance;  decreased weight-shifting ability;  decreased strength;  impaired balance;  impaired postural control;  rolling walker    Gait Training  Gait Training: contact guard;  1 person assist;  nonverbal cues (demo/gestures);  verbal cues;  full weight-bearing   rolling walker;  75 feet;  x2  Gait Analysis: 3-point gait   decreased katelynn;  decreased step length;  decreased trunk rotation;  decreased weight-shifting ability;  decreased strength;  impaired balance;  impaired postural control;  75 feet;  x2;  rolling walker  Gait Number of Times:: x 2    Therapeutic Exercise  Therapeutic Exercise Detail: Semi supine: heel slides x10 ea.           PM&R Impression: as above    Current Disposition Plan :    subacute rehab placement

## 2021-11-17 NOTE — PROGRESS NOTE ADULT - ASSESSMENT
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

## 2021-11-17 NOTE — PROGRESS NOTE ADULT - SUBJECTIVE AND OBJECTIVE BOX
MARGE SUMNER, 81y, Male  [id number]  Patient is a 81y old  Male who presents with a chief complaint of Weakness (16 Nov 2021 07:21)      OVERNIGHT EVENTS: naeo     SUBJECTIVE: Patient seen and examined at bedside. Reports feeling well. Denies fevers, chills, HA, chest pain, sob, nausea, vomiting, abdominal pain, diarrhea, constipation, dysuria. Later reported burning with urination during discharge process.     12 Point ROS Negative unless noted otherwise above.  -------------------------------------------------------------------------------  VITAL SIGNS:  Vital Signs Last 24 Hrs  T(C): 36.9 (17 Nov 2021 05:27), Max: 36.9 (16 Nov 2021 21:10)  T(F): 98.5 (17 Nov 2021 05:27), Max: 98.5 (16 Nov 2021 21:10)  HR: 76 (17 Nov 2021 05:27) (76 - 83)  BP: 150/68 (17 Nov 2021 05:27) (150/68 - 158/68)  BP(mean): --  RR: 20 (17 Nov 2021 05:27) (18 - 20)  SpO2: 96% (17 Nov 2021 05:27) (96% - 98%)  I&O's Summary    16 Nov 2021 07:01  -  17 Nov 2021 07:00  --------------------------------------------------------  IN: 0 mL / OUT: 1900 mL / NET: -1900 mL        PHYSICAL EXAM:    General: elderly man lying in bed in nad   HEENT: NC/AT; EOMI, PERRLA, anicteric sclera; moist mucosal membranes.  Neck: supple, trachea midline  Cardiovascular: RRR, +S1/S2; NO M/R/G  Respiratory: CTA B/L; no W/R/R  Gastrointestinal: soft, NT/ND; +BSx4  Extremities: venous stasis; WWP; no edema or cyanosis  Vascular: 2+ radial, DP/PT pulses B/L  Neurological: AAOx3; no focal deficits    ALLERGIES:  Allergies    No Known Allergies    Intolerances        MEDICATIONS:  MEDICATIONS  (STANDING):  enoxaparin Injectable 40 milliGRAM(s) SubCutaneous every 24 hours  influenza  Vaccine (HIGH DOSE) 0.7 milliLiter(s) IntraMuscular once  polyethylene glycol 3350 17 Gram(s) Oral every 12 hours  senna 1 Tablet(s) Oral daily    MEDICATIONS  (PRN):  acetaminophen     Tablet .. 650 milliGRAM(s) Oral every 6 hours PRN Temp greater or equal to 38C (100.4F), Mild Pain (1 - 3)  melatonin 3 milliGRAM(s) Oral at bedtime PRN Insomnia      -------------------------------------------------------------------------------  LABS:                        10.0   6.85  )-----------( 208      ( 16 Nov 2021 08:15 )             31.7     11-16    139  |  106  |  21  ----------------------------<  96  5.2   |  27  |  1.03    Ca    9.0      16 Nov 2021 08:15  Phos  3.7     11-16  Mg     1.8     11-16    TPro  5.7<L>  /  Alb  3.1<L>  /  TBili  0.3  /  DBili  x   /  AST  30  /  ALT  25  /  AlkPhos  46  11-16    LIVER FUNCTIONS - ( 16 Nov 2021 08:15 )  Alb: 3.1 g/dL / Pro: 5.7 g/dL / ALK PHOS: 46 U/L / ALT: 25 U/L / AST: 30 U/L / GGT: x               CAPILLARY BLOOD GLUCOSE          GI PCR Panel, Stool (collected 14 Nov 2021 16:41)  Source: .Stool None  Final Report (14 Nov 2021 18:13):    Enteropathogenic E. coli (EPEC)    DETECTED by PCR    *******Please Note:*******    GI panel PCR evaluates for:    Campylobacter, Plesiomonas shigelloides, Salmonella,    Vibrio, Yersinia enterocolitica, Enteroaggregative    Escherichia coli (EAEC), Enteropathogenic E.coli (EPEC),    Enterotoxigenic E. coli (ETEC) lt/st, Shiga-like    toxin-producing E. coli (STEC) stx1/stx2,    Shigella/ Enteroinvasive E. coli (EIEC), Cryptosporidium,    Cyclospora cayetanensis, Entamoeba histolytica,    Giardia lamblia, AdenovirusF 40/41, Astrovirus,    Norovirus GI/GII, Rotavirus A, Sapovirus      COVID-19 PCR: Negative (14 Nov 2021 04:24)      RADIOLOGY & ADDITIONAL TESTS: Reviewed.

## 2021-11-17 NOTE — PROGRESS NOTE ADULT - PROBLEM SELECTOR PLAN 2
Likely i/s/o poor PO intake (patient lives alone, has 1-2 meals a day that he orders from outside and does not cook at home, does not have a nursing aide)  Rule out CHF exacerbation (i/s/o unclear cardiac history), unlikely myositis, neurological workup/stroke negative, no hx of hypothyroid and TSH WNL, UA negative no signs of UTI despite urinary retention  CT head negative and CXR WNL  EKG NSR at 75, with no ST elevations/ischemia/infarcts. States that weakness happens more when he is malpositioned while sitting or sleeping. Pt is AOx3, able to recall chronic memories, and appropriate verbalizations.   - B12, folate WNL  - ESR elevated  - Duplex b/l LE negative for DVT (pt has +2 swelling and erythema on legs)  - Fluids D/C'd  Plan:  - F/u echo  - Encourage PO intake  - PT/OT evaluation: Dispo to [facility]

## 2021-11-17 NOTE — PROGRESS NOTE ADULT - PROBLEM SELECTOR PLAN 3
Pt has no hx of BPH but on CT imaging shows significant enlargement of prostate and urinary retention. Pt is able to make urine but after void still feels like he has to go. No indication for UTI (no WBC, febrile, burning/pain on urination)  Most likely 2/2 due to stool burden compression causing obstruction w/ prostate enlargement.   Plan:  - Q8 bladder scans  - condom cath draining 50ccs

## 2021-11-17 NOTE — PROGRESS NOTE ADULT - ASSESSMENT
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    Problem/Plan - 1:  ·  Problem: Acute colitis.   ·  Plan: 2/2 to EPEC as detected on GI PCR  - Pt p/w progressively worsening diarrhea (1-2 episodes per day) for 2 weeks. Denies any sick contacts, new foods.   - Progressed from initially hard stool -> mucous/liquid stool --> diarrhea.  - afebrile, no WBC w/ mild distension and epigastric tenderness.   - CT abd/pelvis shows significant stool burden w/ colitis, no perforation or toxic megacolon.  Plan:  - finished 500mg azithromycin x3 days (11/15-11/17)  - GI consulted, recs appreciated, agrees with above plan  - BM regimen: mineral water enema, senna QD, and miralax BID if needed (avoid if patient has diarrhea).    Problem/Plan - 2:  ·  Problem: General weakness.   ·  Plan: Likely i/s/o poor PO intake (patient lives alone, has 1-2 meals a day that he orders from outside and does not cook at home, does not have a nursing aide)  Rule out CHF exacerbation (i/s/o unclear cardiac history), unlikely myositis, neurological workup/stroke negative, no hx of hypothyroid and TSH WNL, UA negative no signs of UTI despite urinary retention  CT head negative and CXR WNL  EKG NSR at 75, with no ST elevations/ischemia/infarcts. States that weakness happens more when he is malpositioned while sitting or sleeping. Pt is AOx3, able to recall chronic memories, and appropriate verbalizations.   - B12, folate WNL  - ESR elevated  - Duplex b/l LE negative for DVT (pt has +2 swelling and erythema on legs)  - Fluids D/C'd  Plan:  - F/u echo  - Encourage PO intake  - PT/OT evaluation: Dispo to [facility].    Problem/Plan - 3:  ·  Problem: Enlarged prostate with urinary retention.   ·  Plan: Pt has no hx of BPH but on CT imaging shows significant enlargement of prostate and urinary retention. Pt is able to make urine but after void still feels like he has to go. No indication for UTI (no WBC, febrile, burning/pain on urination)  Most likely 2/2 due to stool burden compression causing obstruction w/ prostate enlargement.   Plan:  - Q8 bladder scans  - condom cath draining 50ccs.    Problem/Plan - 4:  ·  Problem: Venous stasis dermatitis.   ·  Plan: Pt has had changes to b/l legs for ~6 weeks w/ rash and pitting edema. 3 points on Wells' Score, most likely 2/2 to poor venous compliance and decreased ambulation.  - pro-  - D-Dimer age adjusted   Plan:  - F/u echo  - Fluids D/Cd.    Problem/Plan - 5:  ·  Problem: Anemia.   ·  Plan: Normocytic (MCV 97)  Hgb 9.2 (baseline 10-12, as last hospitalization in 10/2020 was 12)  Iron studies show NICO, low TIBC, low Iron   Plan:  Trend CBC  Maintain active T&S, transfuse hgb <7.    Problem/Plan - 6:  ·  Problem: Prophylactic measure.   ·  Plan: F: tolerating PO, s/p 1L NS bolus  E: replete K<4, Mg<2  N: Regular     VTE Prophylaxis: Lovenox 40 Q24H  GI: not needed  C: Full Code  D: RMF.

## 2021-11-17 NOTE — PROGRESS NOTE ADULT - PROBLEM SELECTOR PLAN 1
2/2 to EPEC as detected on GI PCR  - Pt p/w progressively worsening diarrhea (1-2 episodes per day) for 2 weeks. Denies any sick contacts, new foods.   - Progressed from initially hard stool -> mucous/liquid stool --> diarrhea.  - afebrile, no WBC w/ mild distension and epigastric tenderness.   - CT abd/pelvis shows significant stool burden w/ colitis, no perforation or toxic megacolon.  Plan:  - finished 500mg azithromycin x3 days (11/15-11/17)  - GI consulted, recs appreciated, agrees with above plan  - BM regimen: mineral water enema, senna QD, and miralax BID if needed (avoid if patient has diarrhea)

## 2021-11-18 PROCEDURE — 99232 SBSQ HOSP IP/OBS MODERATE 35: CPT | Mod: GC

## 2021-11-18 RX ADMIN — ENOXAPARIN SODIUM 40 MILLIGRAM(S): 100 INJECTION SUBCUTANEOUS at 11:07

## 2021-11-18 NOTE — DIETITIAN INITIAL EVALUATION ADULT. - PROBLEM SELECTOR PLAN 1
CT head and CXR nml. EKG NSR at 75, with no ST elevations/ischemia/infarcts. 2 weeks of progressive weakness w/ associated diarrhea. States that weakness happens more when he is malpositioned while sitting or sleeping. Pt is AOx3, able to recall chronic memories, and appropriate verbalizations.     Plan:  - C/w maintenance fluids  cc/hr for 12 hrs  - Encourage PO intake  - PT/OT evaluation  - F/u B12, folate, TSH, free T4

## 2021-11-18 NOTE — DIETITIAN INITIAL EVALUATION ADULT. - OTHER INFO
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 BM 11/15.No N/V or skin breakdown (Roman 16).No pain reported. Patient is presently 101% of IBW. Could not quantify UBW Appears well nourished with age appropriate loss of muscle and fat. Eating 80% of regular diet. Will request MVI.Patient appealed D/C today.Pending [facility] placement.

## 2021-11-18 NOTE — PROGRESS NOTE ADULT - SUBJECTIVE AND OBJECTIVE BOX
MARGE SUMNER, 81y, Male  MRN-[id number]  Patient is a 81y old  Male who presents with a chief complaint of Weakness (2021 12:11)      OVERNIGHT EVENTS: naeo     SUBJECTIVE: Patient seen and examined at bedside. Reports feeling some mild burning on urination and now reports some brown nonbloody loose stool. Denies fevers, chills, HA, chest pain, sob, nausea, vomiting, abdominal pain, diarrhea, constipation, dysuria.     12 Point ROS Negative unless noted otherwise above.  -------------------------------------------------------------------------------  VITAL SIGNS:  Vital Signs Last 24 Hrs  T(C): 36.9 (2021 06:32), Max: 36.9 (2021 20:26)  T(F): 98.4 (2021 06:32), Max: 98.4 (2021 20:26)  HR: 75 (2021 06:32) (72 - 75)  BP: 135/63 (2021 06:32) (110/56 - 135/63)  BP(mean): --  RR: 19 (2021 06:32) (18 - 19)  SpO2: 96% (2021 06:32) (96% - 97%)  I&O's Summary    2021 07:01  -  2021 07:00  --------------------------------------------------------  IN: 0 mL / OUT: 2500 mL / NET: -2500 mL        PHYSICAL EXAM:    General: elderly man lying in bed in nad   HEENT: NC/AT; EOMI, PERRLA, anicteric sclera; moist mucosal membranes.  Neck: supple, trachea midline  Cardiovascular: RRR, +S1/S2; NO M/R/G  Respiratory: CTA B/L; no W/R/R  Gastrointestinal: soft, NT/ND; +BSx4  Extremities: venous stasis; WWP; no edema or cyanosis  Vascular: 2+ radial, DP/PT pulses B/L  Neurological: AAOx3; no focal deficits    ALLERGIES:  Allergies    No Known Allergies    Intolerances        MEDICATIONS:  MEDICATIONS  (STANDING):  enoxaparin Injectable 40 milliGRAM(s) SubCutaneous every 24 hours  influenza  Vaccine (HIGH DOSE) 0.7 milliLiter(s) IntraMuscular once    MEDICATIONS  (PRN):  acetaminophen     Tablet .. 650 milliGRAM(s) Oral every 6 hours PRN Temp greater or equal to 38C (100.4F), Mild Pain (1 - 3)  melatonin 3 milliGRAM(s) Oral at bedtime PRN Insomnia      -------------------------------------------------------------------------------  LABS:              Urinalysis Basic - ( 2021 13:00 )    Color: Yellow / Appearance: Hazy / S.015 / pH: x  Gluc: x / Ketone: NEGATIVE  / Bili: Negative / Urobili: 0.2 E.U./dL   Blood: x / Protein: NEGATIVE mg/dL / Nitrite: POSITIVE   Leuk Esterase: NEGATIVE / RBC: < 5 /HPF / WBC < 5 /HPF   Sq Epi: x / Non Sq Epi: 0-5 /HPF / Bacteria: Present /HPF      CAPILLARY BLOOD GLUCOSE          Urinalysis with Rflx Culture (collected 2021 13:00)      COVID-19 PCR: Negative (2021 04:24)      RADIOLOGY & ADDITIONAL TESTS: Reviewed.

## 2021-11-18 NOTE — PROGRESS NOTE ADULT - ASSESSMENT
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

## 2021-11-18 NOTE — DIETITIAN INITIAL EVALUATION ADULT. - ORAL INTAKE PTA/DIET HISTORY
noted suspected inadequate oral intake PTA with only 1-2 meals ,mostly outside foods and diarrhea x2 weeks PTA

## 2021-11-18 NOTE — DIETITIAN INITIAL EVALUATION ADULT. - PROBLEM SELECTOR PLAN 2
Pt has had diarrhea for 2 weeks. Denies any sick contacts, mucous/liquid stool --> diarrhea, says that he has 1 episode per day. He is afebrile, no WBC w/ mild epigastric tenderness. CT abd/pelvis shows significant stool burden w/ colitis, no perforation or toxic megacolon.    Plan:  - BM regimen: mineral water enema, senna QD, and miralax BID

## 2021-11-19 PROCEDURE — 99232 SBSQ HOSP IP/OBS MODERATE 35: CPT | Mod: GC

## 2021-11-19 RX ADMIN — ENOXAPARIN SODIUM 40 MILLIGRAM(S): 100 INJECTION SUBCUTANEOUS at 09:56

## 2021-11-19 NOTE — PROGRESS NOTE ADULT - PROBLEM SELECTOR PLAN 1
2/2 to EPEC as detected on GI PCR  - Pt p/w progressively worsening diarrhea (1-2 episodes per day) for 2 weeks. Denies any sick contacts, new foods.   - Progressed from initially hard stool -> mucous/liquid stool --> diarrhea.  - afebrile, no WBC w/ mild distension and epigastric tenderness.   - CT abd/pelvis shows significant stool burden w/ colitis, no perforation or toxic megacolon.  Plan:  - finished 500mg azithromycin x3 days (11/15-11/17)  - GI consulted, recs appreciated, agrees with above plan  - pt reports some diarrhea on 11/18 and 11/19, per nursing staff stool is not diarrhea, low c/f acute gi pathology at this point given afebrile, no AP, no n/v, non toxic appearance; dc'ed bowel regimen out of caution

## 2021-11-19 NOTE — PROGRESS NOTE ADULT - SUBJECTIVE AND OBJECTIVE BOX
MARGE SUMNER, 81y, Male  [id number]  Patient is a 81y old  Male who presents with a chief complaint of Weakness (2021 15:31)      OVERNIGHT EVENTS:     SUBJECTIVE:    12 Point ROS Negative unless noted otherwise above.  -------------------------------------------------------------------------------  VITAL SIGNS:  Vital Signs Last 24 Hrs  T(C): 36.8 (2021 06:17), Max: 36.9 (2021 21:07)  T(F): 98.3 (2021 06:17), Max: 98.4 (2021 21:07)  HR: 62 (2021 06:17) (62 - 75)  BP: 131/60 (2021 06:17) (130/57 - 132/66)  BP(mean): --  RR: 16 (2021 06:17) (16 - 18)  SpO2: 97% (2021 06:17) (95% - 97%)  I&O's Summary    2021 07:01  -  2021 07:00  --------------------------------------------------------  IN: 0 mL / OUT: 2000 mL / NET: -2000 mL        PHYSICAL EXAM:    General: NAD, well developed  HEENT: NC/AT; EOMI, PERRLA, anicteric sclera; moist mucosal membranes.  Neck: supple, trachea midline  Cardiovascular: RRR, +S1/S2; NO M/R/G  Respiratory: CTA B/L; no W/R/R  Gastrointestinal: soft, NT/ND; +BSx4  Extremities: WWP; no edema or cyanosis  Vascular: 2+ radial, DP/PT pulses B/L  Neurological: AAOx3; no focal deficits    ALLERGIES:  Allergies    No Known Allergies    Intolerances        MEDICATIONS:  MEDICATIONS  (STANDING):  enoxaparin Injectable 40 milliGRAM(s) SubCutaneous every 24 hours  influenza  Vaccine (HIGH DOSE) 0.7 milliLiter(s) IntraMuscular once    MEDICATIONS  (PRN):  acetaminophen     Tablet .. 650 milliGRAM(s) Oral every 6 hours PRN Temp greater or equal to 38C (100.4F), Mild Pain (1 - 3)  melatonin 3 milliGRAM(s) Oral at bedtime PRN Insomnia      -------------------------------------------------------------------------------  LABS:              Urinalysis Basic - ( 2021 13:00 )    Color: Yellow / Appearance: Hazy / S.015 / pH: x  Gluc: x / Ketone: NEGATIVE  / Bili: Negative / Urobili: 0.2 E.U./dL   Blood: x / Protein: NEGATIVE mg/dL / Nitrite: POSITIVE   Leuk Esterase: NEGATIVE / RBC: < 5 /HPF / WBC < 5 /HPF   Sq Epi: x / Non Sq Epi: 0-5 /HPF / Bacteria: Present /HPF      CAPILLARY BLOOD GLUCOSE          Urinalysis with Rflx Culture (collected 2021 13:00)      COVID-19 PCR: Negative (2021 04:24)      RADIOLOGY & ADDITIONAL TESTS: Reviewed.       MARGE SUMNER, 81y, Male  [id number]  Patient is a 81y old  Male who presents with a chief complaint of Weakness (2021 15:31)      OVERNIGHT EVENTS: naeo     SUBJECTIVE: Patient seen and examined at bedside. Reports some diarrhea earlier in the morning. Denies fevers, chills, HA, chest pain, sob, nausea, vomiting, abdominal pain, constipation, dysuria.     12 Point ROS Negative unless noted otherwise above.  -------------------------------------------------------------------------------  VITAL SIGNS:  Vital Signs Last 24 Hrs  T(C): 36.8 (2021 06:17), Max: 36.9 (2021 21:07)  T(F): 98.3 (2021 06:17), Max: 98.4 (2021 21:07)  HR: 62 (2021 06:17) (62 - 75)  BP: 131/60 (2021 06:17) (130/57 - 132/66)  BP(mean): --  RR: 16 (2021 06:17) (16 - 18)  SpO2: 97% (2021 06:17) (95% - 97%)  I&O's Summary    2021 07:01  -  2021 07:00  --------------------------------------------------------  IN: 0 mL / OUT: 2000 mL / NET: -2000 mL        PHYSICAL EXAM:    General: elderly man lying in bed in nad   HEENT: NC/AT; EOMI, PERRLA, anicteric sclera; moist mucosal membranes.  Neck: supple, trachea midline  Cardiovascular: RRR, +S1/S2; NO M/R/G  Respiratory: CTA B/L; no W/R/R  Gastrointestinal: soft, NT/ND; +BSx4  Extremities: venous stasis; WWP; no edema or cyanosis  Vascular: 2+ radial, DP/PT pulses B/L  Neurological: AAOx3; no focal deficits    ALLERGIES:  Allergies    No Known Allergies    Intolerances        MEDICATIONS:  MEDICATIONS  (STANDING):  enoxaparin Injectable 40 milliGRAM(s) SubCutaneous every 24 hours  influenza  Vaccine (HIGH DOSE) 0.7 milliLiter(s) IntraMuscular once    MEDICATIONS  (PRN):  acetaminophen     Tablet .. 650 milliGRAM(s) Oral every 6 hours PRN Temp greater or equal to 38C (100.4F), Mild Pain (1 - 3)  melatonin 3 milliGRAM(s) Oral at bedtime PRN Insomnia      -------------------------------------------------------------------------------  LABS:              Urinalysis Basic - ( 2021 13:00 )    Color: Yellow / Appearance: Hazy / S.015 / pH: x  Gluc: x / Ketone: NEGATIVE  / Bili: Negative / Urobili: 0.2 E.U./dL   Blood: x / Protein: NEGATIVE mg/dL / Nitrite: POSITIVE   Leuk Esterase: NEGATIVE / RBC: < 5 /HPF / WBC < 5 /HPF   Sq Epi: x / Non Sq Epi: 0-5 /HPF / Bacteria: Present /HPF      CAPILLARY BLOOD GLUCOSE          Urinalysis with Rflx Culture (collected 2021 13:00)      COVID-19 PCR: Negative (2021 04:24)      RADIOLOGY & ADDITIONAL TESTS: Reviewed.

## 2021-11-19 NOTE — PROGRESS NOTE ADULT - ATTENDING COMMENTS
agree with assessment and plan as documented by resident.   --patient given discharge notice yesterday - now complaining of dysyruia and appealing discharge  --will check repeat UA ; s/p treatment of EPEC, diarrhea resolved 1 episode of BM overnight per nursing staff
agree with assessment and plan as documented by resident.  --patient's appeal rejected, however he appealed discharge again. have discussed with patient that his diarrhea has improved and he is s/p abx - he is concerned about If he gets worse diarrhea at [facility]. of note, patient's wife  years ago at nursing home from c.diff, which is likely causing his anxiety about [facility] placement. have discussed that he is not exhibiting signs/sx of c.diff infection.   --will add psyllium to bulk stool    dispo to [facility] pending repeat appeal
agree with assessment and plan as documented by resident.   --continue azithromycin for +EPEC in GI pcr  --will obtain collateral from outpatient pcp - if no recent, will obtain echocardiogram    dispo pending echo - likely d/c this afternoon to [facility]
agree with assessment and plan as documented by resident.    --will start azithromycin 500 x  3 days for +EPEC as patient has had >1 week   --f/u doppler to r/o DVT  --will get echocardiogram to r/o cardiac dysfunction given progressive weakness and LE edema    likely discharge to [facility] tomorrow

## 2021-11-19 NOTE — PROGRESS NOTE ADULT - ASSESSMENT
This is an 82 yo M w/ unreported PMHx who was BIBA and complained of 2 weeks of diarrhea; being admitted to [facility] for generalized weakness, found to have colitis 2/2 to Enteropathogenic EColi, admitted for further management.

## 2021-11-20 LAB — SARS-COV-2 RNA SPEC QL NAA+PROBE: SIGNIFICANT CHANGE UP

## 2021-11-20 PROCEDURE — 99232 SBSQ HOSP IP/OBS MODERATE 35: CPT | Mod: GC

## 2021-11-20 RX ADMIN — ENOXAPARIN SODIUM 40 MILLIGRAM(S): 100 INJECTION SUBCUTANEOUS at 09:24

## 2021-11-20 NOTE — PROGRESS NOTE ADULT - ASSESSMENT
80 yo male with no reported PMHX a/w weakness, found to have colitis 2/2 enteropathic E. coli    1) colitis - 2/2 EPEC  diarrhea improving  afebrile, non toxic appearing  s/p azithro x 3 days    2) generalized weakness - likely 2/2 E. coli, decreased PO intake and deconditioning  plan for [facility]  encourage PO intake     3) anemia - stable    24 hour discharge notice appeal pending

## 2021-11-20 NOTE — PROGRESS NOTE ADULT - SUBJECTIVE AND OBJECTIVE BOX
Pt seen and examined at bedside.  No new complaints    Vital Signs Last 24 Hrs  T(C): 37.1 (20 Nov 2021 05:52), Max: 37.1 (20 Nov 2021 05:52)  T(F): 98.8 (20 Nov 2021 05:52), Max: 98.8 (20 Nov 2021 05:52)  HR: 59 (20 Nov 2021 05:52) (59 - 70)  BP: 120/53 (20 Nov 2021 05:52) (120/53 - 135/55)  BP(mean): --  RR: 18 (20 Nov 2021 05:52) (16 - 18)  SpO2: 97% (20 Nov 2021 05:52) (96% - 97%)    AA and O x 3 NAD  NCAT  neck supple  s1s2 RRR  lungs cta b/l  abd soft NTND  ext no edema  skin w/w/p  no focal deficits    no new labs

## 2021-11-21 PROCEDURE — 99232 SBSQ HOSP IP/OBS MODERATE 35: CPT | Mod: GC

## 2021-11-21 RX ADMIN — ENOXAPARIN SODIUM 40 MILLIGRAM(S): 100 INJECTION SUBCUTANEOUS at 09:44

## 2021-11-21 NOTE — PROGRESS NOTE ADULT - PROBLEM SELECTOR PLAN 1
2/2 to EPEC as detected on GI PCR. Pt afebrile w/ no WBC elevation. CT abd/pelvis shows significant stool burden w/ colitis, no perforation or toxic megacolon.    Plan:  - s/p 500mg azithromycin x3 days (11/15-11/17)  - pt reports some diarrhea on 11/18 and 11/19, per nursing staff stool is not diarrhea, low c/f acute gi pathology at this point given afebrile, no AP, no n/v, non toxic appearance; dc'ed bowel regimen out of caution  - pt stable for discharge

## 2021-11-21 NOTE — PROGRESS NOTE ADULT - ASSESSMENT
This is an 80 yo M w/ unreported PMHx who was BIBA and complained of 2 weeks of diarrhea; being admitted to [facility] for generalized weakness, found to have colitis 2/2 to Enteropathogenic EColi, admitted for further management.

## 2021-11-21 NOTE — PROGRESS NOTE ADULT - PROBLEM SELECTOR PLAN 2
Likely i/s/o poor PO intake (patient lives alone, has 1-2 meals a day that he orders from outside and does not cook at home, does not have a nursing aide)    - PT/OT evaluation: Dispo to [facility]

## 2021-11-21 NOTE — PROGRESS NOTE ADULT - TIME BILLING
Pt seen and examined at bedside.    1) colitis - 2/2 EPEC  diarrhea improving  afebrile, non toxic appearing  s/p azithro x 3 days    2) generalized weakness - likely 2/2 E. Coli and decreased PO intake  plan for [facility]  encourage PO intake    24 hour discharge appeal pending
discussion with pt, review of prior labs/chart

## 2021-11-21 NOTE — PROGRESS NOTE ADULT - SUBJECTIVE AND OBJECTIVE BOX
OVERNIGHT EVENTS:    SUBJECTIVE / INTERVAL HPI: Patient seen and examined at bedside.     VITAL SIGNS:  Vital Signs Last 24 Hrs  T(C): 37.1 (21 Nov 2021 06:18), Max: 37.1 (21 Nov 2021 06:18)  T(F): 98.7 (21 Nov 2021 06:18), Max: 98.7 (21 Nov 2021 06:18)  HR: 65 (21 Nov 2021 06:18) (60 - 67)  BP: 146/70 (21 Nov 2021 06:18) (122/61 - 146/70)  BP(mean): --  RR: 18 (21 Nov 2021 06:18) (17 - 18)  SpO2: 96% (21 Nov 2021 06:18) (96% - 97%)  I&O's Summary    20 Nov 2021 07:01  -  21 Nov 2021 07:00  --------------------------------------------------------  IN: 0 mL / OUT: 400 mL / NET: -400 mL        PHYSICAL EXAM:    General: NAD, lying comfortably in bed   HEENT: Anicteric sclera, EOMI, MMM  Cardiovascular: +S1/S2; RRR; No JVD   Respiratory: CTAB, no accessory muscle use  Gastrointestinal: soft, non-distended, no tenderness to palpation  Extremities: WWP; no edema, no erythema, no tenderness to palpation  Vascular: 2+ radial, DP/PT pulses B/L  Neurological: AAOx3; no focal deficits  Skin: No visible rash, skin discoloration    MEDICATIONS:  MEDICATIONS  (STANDING):  enoxaparin Injectable 40 milliGRAM(s) SubCutaneous every 24 hours  influenza  Vaccine (HIGH DOSE) 0.7 milliLiter(s) IntraMuscular once    MEDICATIONS  (PRN):  acetaminophen     Tablet .. 650 milliGRAM(s) Oral every 6 hours PRN Temp greater or equal to 38C (100.4F), Mild Pain (1 - 3)  melatonin 3 milliGRAM(s) Oral at bedtime PRN Insomnia      ALLERGIES:  Allergies    No Known Allergies    Intolerances        LABS:              CAPILLARY BLOOD GLUCOSE          RADIOLOGY & ADDITIONAL TESTS: Reviewed.   OVERNIGHT EVENTS: No acute events overnight. Pt remained afebrile and hemodynamically stable.     SUBJECTIVE / INTERVAL HPI: Patient seen and examined at bedside. No complaints. Awaiting 24 hr notice appeal     ROS: 12 pt ROS otherwise negative unless stated above.     VITAL SIGNS:  Vital Signs Last 24 Hrs  T(C): 37.1 (21 Nov 2021 06:18), Max: 37.1 (21 Nov 2021 06:18)  T(F): 98.7 (21 Nov 2021 06:18), Max: 98.7 (21 Nov 2021 06:18)  HR: 65 (21 Nov 2021 06:18) (60 - 67)  BP: 146/70 (21 Nov 2021 06:18) (122/61 - 146/70)  BP(mean): --  RR: 18 (21 Nov 2021 06:18) (17 - 18)  SpO2: 96% (21 Nov 2021 06:18) (96% - 97%)  I&O's Summary    20 Nov 2021 07:01  -  21 Nov 2021 07:00  --------------------------------------------------------  IN: 0 mL / OUT: 400 mL / NET: -400 mL      PHYSICAL EXAM:    General: elderly man lying in bed in nad   HEENT: NC/AT; EOMI, PERRLA, anicteric sclera; moist mucosal membranes.  Neck: supple, trachea midline  Cardiovascular: RRR, +S1/S2; NO M/R/G  Respiratory: CTA B/L; no W/R/R  Gastrointestinal: soft, NT/ND; +BSx4  Extremities: venous stasis; WWP; no edema or cyanosis  Vascular: 2+ radial, DP/PT pulses B/L  Neurological: AAOx3; no focal deficits    MEDICATIONS:  MEDICATIONS  (STANDING):  enoxaparin Injectable 40 milliGRAM(s) SubCutaneous every 24 hours  influenza  Vaccine (HIGH DOSE) 0.7 milliLiter(s) IntraMuscular once    MEDICATIONS  (PRN):  acetaminophen     Tablet .. 650 milliGRAM(s) Oral every 6 hours PRN Temp greater or equal to 38C (100.4F), Mild Pain (1 - 3)  melatonin 3 milliGRAM(s) Oral at bedtime PRN Insomnia      ALLERGIES:  Allergies    No Known Allergies    Intolerances        No recent Labs or Imaging

## 2021-11-21 NOTE — PROGRESS NOTE ADULT - PROBLEM SELECTOR PLAN 4
Pt has had changes to b/l legs for ~6 weeks w/ rash and pitting edema. 3 points on Wells' Score, most likely 2/2 to poor venous compliance and decreased ambulation.    - LE doppler negative for DVT

## 2021-11-21 NOTE — PROGRESS NOTE ADULT - PROBLEM SELECTOR PLAN 3
Pt has no hx of BPH but on CT imaging shows significant enlargement of prostate and urinary retention. Pt is able to make urine but after void still feels like he has to go. No indication for UTI (no WBC, febrile, burning/pain on urination)    - Pt voiding independently via condom catheter

## 2021-11-22 PROCEDURE — 99232 SBSQ HOSP IP/OBS MODERATE 35: CPT | Mod: GC

## 2021-11-22 RX ADMIN — ENOXAPARIN SODIUM 40 MILLIGRAM(S): 100 INJECTION SUBCUTANEOUS at 12:26

## 2021-11-22 NOTE — PROGRESS NOTE ADULT - SUBJECTIVE AND OBJECTIVE BOX
MARGE SUMNER, 81y, Male  [id number]  Patient is a 81y old  Male who presents with a chief complaint of Weakness (21 Nov 2021 08:50)      OVERNIGHT EVENTS: naeo     SUBJECTIVE: Patient seen and examined at bedside. Reports one BM with a small amount of diarrhea, no blood noted. Denies fevers, chills, HA, chest pain, sob, nausea, vomiting, abdominal pain, constipation, dysuria.     12 Point ROS Negative unless noted otherwise above.  -------------------------------------------------------------------------------  VITAL SIGNS:  Vital Signs Last 24 Hrs  T(C): 36.6 (22 Nov 2021 05:41), Max: 36.8 (21 Nov 2021 20:15)  T(F): 97.9 (22 Nov 2021 05:41), Max: 98.3 (21 Nov 2021 20:15)  HR: 59 (22 Nov 2021 05:41) (59 - 68)  BP: 126/68 (22 Nov 2021 05:41) (126/68 - 136/56)  BP(mean): --  RR: 19 (22 Nov 2021 05:41) (18 - 19)  SpO2: 99% (22 Nov 2021 05:41) (95% - 99%)  I&O's Summary    21 Nov 2021 07:01  -  22 Nov 2021 07:00  --------------------------------------------------------  IN: 0 mL / OUT: 3400 mL / NET: -3400 mL        PHYSICAL EXAM:    General: elderly man lying in bed in nad   HEENT: NC/AT; EOMI, PERRLA, anicteric sclera; moist mucosal membranes.  Neck: supple, trachea midline  Cardiovascular: RRR, +S1/S2; NO M/R/G  Respiratory: CTA B/L; no W/R/R  Gastrointestinal: soft, NT/ND; +BSx4  Extremities: venous stasis; WWP; no edema or cyanosis  Vascular: 2+ radial, DP/PT pulses B/L  Neurological: AAOx3; no focal deficits    ALLERGIES:  Allergies    No Known Allergies    Intolerances        MEDICATIONS:  MEDICATIONS  (STANDING):  enoxaparin Injectable 40 milliGRAM(s) SubCutaneous every 24 hours  influenza  Vaccine (HIGH DOSE) 0.7 milliLiter(s) IntraMuscular once    MEDICATIONS  (PRN):  acetaminophen     Tablet .. 650 milliGRAM(s) Oral every 6 hours PRN Temp greater or equal to 38C (100.4F), Mild Pain (1 - 3)  melatonin 3 milliGRAM(s) Oral at bedtime PRN Insomnia      -------------------------------------------------------------------------------  LABS:                CAPILLARY BLOOD GLUCOSE          COVID-19 PCR: NotDetec (20 Nov 2021 07:06)  COVID-19 PCR: Negative (14 Nov 2021 04:24)      RADIOLOGY & ADDITIONAL TESTS: Reviewed.

## 2021-11-22 NOTE — PROGRESS NOTE ADULT - PROBLEM SELECTOR PLAN 6
F: tolerating PO, s/p 1L NS bolus  E: replete K<4, Mg<2  N: Regular     VTE Prophylaxis: Lovenox 40 Q24H  GI: not needed  C: Full Code  D: RMF
F: tolerating PO, s/p 1L NS bolus  E: replete K<4, Mg<2  N: Regular     VTE Prophylaxis: Lovenox 40 Q24H  GI: not needed  C: Full Code  D: RMF
F: None   E: replete K<4, Mg<2  N: Regular     VTE Prophylaxis: Lovenox 40 Q24H  GI: None  C: Full Code  D: [facility]
F: None   E: replete K<4, Mg<2  N: Regular     VTE Prophylaxis: Lovenox 40 Q24H  GI: None  C: Full Code  D: [facility]
F: tolerating PO, s/p 1L NS bolus  E: replete K<4, Mg<2  N: Regular     VTE Prophylaxis: Lovenox 40 Q24H  GI: not needed  C: Full Code  D: RMF

## 2021-11-22 NOTE — PROGRESS NOTE ADULT - ASSESSMENT
per Internal Medicine    80 yo M w/ unreported PMHx who was BIBA and complained of 2 weeks of diarrhea; being admitted to [facility] for generalized weakness, found to have colitis 2/2 to Enteropathogenic EColi, now resolved s/p 3 day azithro course     Problem/Plan - 1:  ·  Problem: Acute colitis.   ·  Plan: 2/2 to EPEC as detected on GI PCR. Pt afebrile w/ no WBC elevation. CT abd/pelvis shows significant stool burden w/ colitis, no perforation or toxic megacolon.    Plan:  - s/p 500mg azithromycin x3 days (11/15-11/17)  - pt reports some diarrhea on 11/18 and 11/19, per nursing staff stool is not diarrhea, low c/f acute gi pathology at this point given afebrile, no AP, no n/v, non toxic appearance; dc'ed bowel regimen out of caution  - pt stable for discharge.    Problem/Plan - 2:  ·  Problem: General weakness.   ·  Plan: Likely i/s/o poor PO intake (patient lives alone, has 1-2 meals a day that he orders from outside and does not cook at home, does not have a nursing aide)    - PT/OT evaluation: Dispo to [facility].    Problem/Plan - 3:  ·  Problem: Enlarged prostate with urinary retention.   ·  Plan: Pt has no hx of BPH but on CT imaging shows significant enlargement of prostate and urinary retention. Pt is able to make urine but after void still feels like he has to go. No indication for UTI (no WBC, febrile, burning/pain on urination)    - Pt voiding independently via condom catheter.    Problem/Plan - 4:  ·  Problem: Venous stasis dermatitis.   ·  Plan: Pt has had changes to b/l legs for ~6 weeks w/ rash and pitting edema. 3 points on Wells' Score, most likely 2/2 to poor venous compliance and decreased ambulation.    - LE doppler negative for DVT.    Problem/Plan - 5:  ·  Problem: Anemia.   ·  Plan: 2/2 iron deficiency anemia. Hgb stable on CBC.    Problem/Plan - 6:  ·  Problem: Prophylactic measure.   ·  Plan: F: None   E: replete K<4, Mg<2  N: Regular     VTE Prophylaxis: Lovenox 40 Q24H  GI: None  C: Full Code

## 2021-11-22 NOTE — PROGRESS NOTE ADULT - SUBJECTIVE AND OBJECTIVE BOX
Physical Medicine and Rehabilitation Progress Note:    Patient is a 81y old  Male who presents with a chief complaint of Weakness (22 Nov 2021 11:17)      HPI:  This is an 80 yo M w/ unreported PMHx who was BIBA for weakness. He mentions that he has had diarrhea for the past 2 weeks, no f/c/n/v. He attributes the changes in his bowel from limiting the amount of food he eats - two meals per day or one big dinner. He denies being around any sick contacts who changes in foods that he has eaten in the past. He denies any falls, that he sat down and wasn't feeling well and called his night super. The night super came to check-in on him and said that he should call EMS. Per signout from ED Provider, EMS mentioned that the living arrangements were not ideal - that it was hard for them to get into the apartment as there were signs of hoarding.     ED Course  Vitals: 98.3F, HR 75, 165/86, RR 16, SpO2 98% on RA   Labs: Hgb 10.9, Hct 33.2, UA neg, CMP wnl   EKG: NSR at 75 bpm, no ischemia or infarcts  Imaging: CT Abd/Pelvis: sigmoid colitis. No perforation or toxic megacolon. No acute mesenteric thrombus. Large amount of stool in the colon. CT Head: no acute pathology, no significant changes compared to 10/2020. CT cervical spine: no acute fx or malalignment. CXR: nml, no cardiomegaly.   Interventions: Augmentin 875 mg, 1L NS bolus  (14 Nov 2021 07:49)                Vital Signs Last 24 Hrs  T(C): 36.7 (22 Nov 2021 13:41), Max: 36.8 (21 Nov 2021 20:15)  T(F): 98.1 (22 Nov 2021 13:41), Max: 98.3 (21 Nov 2021 20:15)  HR: 62 (22 Nov 2021 13:41) (59 - 68)  BP: 111/57 (22 Nov 2021 13:41) (111/57 - 136/56)  BP(mean): --  RR: 18 (22 Nov 2021 13:41) (18 - 19)  SpO2: 98% (22 Nov 2021 13:41) (95% - 99%)    MEDICATIONS  (STANDING):  enoxaparin Injectable 40 milliGRAM(s) SubCutaneous every 24 hours  influenza  Vaccine (HIGH DOSE) 0.7 milliLiter(s) IntraMuscular once    MEDICATIONS  (PRN):  acetaminophen     Tablet .. 650 milliGRAM(s) Oral every 6 hours PRN Temp greater or equal to 38C (100.4F), Mild Pain (1 - 3)  melatonin 3 milliGRAM(s) Oral at bedtime PRN Insomnia    Currently Undergoing Physical/ Occupational Therapy at bedside.    Functional Status Assessment:           Cognitive/Perceptual/Neuro  Cognitive/Neuro/Behavioral [WDL Definition: Alert; opens eyes spontaneously; arouses to voice or touch; oriented x 4; follows commands; speech spontaneous, logical; purposeful motor response; behavior appropriate to situation]: WDL    Language Assistance  Preferred Language to Address Healthcare Preferred Language to Address Healthcare: English    Therapeutic Interventions      Bed Mobility  Bed Mobility Training Rolling/Turning: supervision  Bed Mobility Training Scooting: supervision  Bed Mobility Training Sit-to-Supine: supervision  Bed Mobility Training Supine-to-Sit: supervision  Bed Mobility Training Limitations: decreased strength;  impaired balance    Sit-Stand Transfer Training  Transfer Training Sit-to-Stand Transfer: contact guard;  verbal cues;  proper hand placement;  rolling walker  Transfer Training Stand-to-Sit Transfer: contact guard;  rolling walker  Sit-to-Stand Transfer Training Transfer Safety Analysis: decreased balance;  decreased strength;  impaired balance;  rolling walker    Therapeutic Exercise  Therapeutic Exercise Detail: Functional mobility training with pt able to ambulate ~20 ft x2 using RW with CGA 2/2 decreased balance and decreased activity tolerance noted requiring standing rest break.     Upper Body Dressing Training  Upper Body Dressing Training Assistance: stand-by assist;  verbal cues;  nonverbal cues (demo/gestures);  Don gown sitting EOB. cueing for threading R arm through sleeve;  decreased flexibility;  decreased strength          PM&R Impression: as above    Current Disposition Plan Recommendations:    subacute rehab placement

## 2021-11-22 NOTE — PROGRESS NOTE ADULT - PROBLEM SELECTOR PROBLEM 3
Enlarged prostate with urinary retention

## 2021-11-22 NOTE — PROGRESS NOTE ADULT - PROBLEM SELECTOR PROBLEM 4
Venous stasis dermatitis
(2) assistive person

## 2021-11-22 NOTE — PROGRESS NOTE ADULT - PROVIDER SPECIALTY LIST ADULT
Internal Medicine
Rehab Medicine
Rehab Medicine
Internal Medicine

## 2021-11-22 NOTE — PROGRESS NOTE ADULT - ASSESSMENT
This is an 82 yo M w/ unreported PMHx who was BIBA and complained of 2 weeks of diarrhea; being admitted to [facility] for generalized weakness, found to have colitis 2/2 to Enteropathogenic EColi, now resolved s/p 3 day azithro course

## 2021-11-23 VITALS
HEART RATE: 60 BPM | TEMPERATURE: 98 F | DIASTOLIC BLOOD PRESSURE: 65 MMHG | OXYGEN SATURATION: 100 % | SYSTOLIC BLOOD PRESSURE: 131 MMHG | RESPIRATION RATE: 18 BRPM

## 2021-11-23 PROCEDURE — 99232 SBSQ HOSP IP/OBS MODERATE 35: CPT | Mod: GC

## 2021-11-23 RX ADMIN — ENOXAPARIN SODIUM 40 MILLIGRAM(S): 100 INJECTION SUBCUTANEOUS at 14:32

## 2021-11-30 DIAGNOSIS — R01.1 CARDIAC MURMUR, UNSPECIFIED: ICD-10-CM

## 2021-11-30 DIAGNOSIS — R63.8 OTHER SYMPTOMS AND SIGNS CONCERNING FOOD AND FLUID INTAKE: ICD-10-CM

## 2021-11-30 DIAGNOSIS — M47.812 SPONDYLOSIS WITHOUT MYELOPATHY OR RADICULOPATHY, CERVICAL REGION: ICD-10-CM

## 2021-11-30 DIAGNOSIS — M47.9 SPONDYLOSIS, UNSPECIFIED: ICD-10-CM

## 2021-11-30 DIAGNOSIS — D64.9 ANEMIA, UNSPECIFIED: ICD-10-CM

## 2021-11-30 DIAGNOSIS — R33.8 OTHER RETENTION OF URINE: ICD-10-CM

## 2021-11-30 DIAGNOSIS — R26.2 DIFFICULTY IN WALKING, NOT ELSEWHERE CLASSIFIED: ICD-10-CM

## 2021-11-30 DIAGNOSIS — A04.0 ENTEROPATHOGENIC ESCHERICHIA COLI INFECTION: ICD-10-CM

## 2021-11-30 DIAGNOSIS — F42.3 HOARDING DISORDER: ICD-10-CM

## 2021-11-30 DIAGNOSIS — M62.81 MUSCLE WEAKNESS (GENERALIZED): ICD-10-CM

## 2021-11-30 DIAGNOSIS — N40.1 BENIGN PROSTATIC HYPERPLASIA WITH LOWER URINARY TRACT SYMPTOMS: ICD-10-CM

## 2021-11-30 DIAGNOSIS — I87.2 VENOUS INSUFFICIENCY (CHRONIC) (PERIPHERAL): ICD-10-CM

## 2021-11-30 DIAGNOSIS — F41.9 ANXIETY DISORDER, UNSPECIFIED: ICD-10-CM

## 2021-11-30 DIAGNOSIS — I87.8 OTHER SPECIFIED DISORDERS OF VEINS: ICD-10-CM

## 2021-11-30 DIAGNOSIS — R53.1 WEAKNESS: ICD-10-CM

## 2021-12-22 PROCEDURE — 83880 ASSAY OF NATRIURETIC PEPTIDE: CPT

## 2021-12-22 PROCEDURE — 72125 CT NECK SPINE W/O DYE: CPT | Mod: MG

## 2021-12-22 PROCEDURE — 97116 GAIT TRAINING THERAPY: CPT

## 2021-12-22 PROCEDURE — 82746 ASSAY OF FOLIC ACID SERUM: CPT

## 2021-12-22 PROCEDURE — 82728 ASSAY OF FERRITIN: CPT

## 2021-12-22 PROCEDURE — 83550 IRON BINDING TEST: CPT

## 2021-12-22 PROCEDURE — 85379 FIBRIN DEGRADATION QUANT: CPT

## 2021-12-22 PROCEDURE — U0003: CPT

## 2021-12-22 PROCEDURE — 99285 EMERGENCY DEPT VISIT HI MDM: CPT

## 2021-12-22 PROCEDURE — 87507 IADNA-DNA/RNA PROBE TQ 12-25: CPT

## 2021-12-22 PROCEDURE — G1004: CPT

## 2021-12-22 PROCEDURE — 36415 COLL VENOUS BLD VENIPUNCTURE: CPT

## 2021-12-22 PROCEDURE — 87635 SARS-COV-2 COVID-19 AMP PRB: CPT

## 2021-12-22 PROCEDURE — 86769 SARS-COV-2 COVID-19 ANTIBODY: CPT

## 2021-12-22 PROCEDURE — 84466 ASSAY OF TRANSFERRIN: CPT

## 2021-12-22 PROCEDURE — 86850 RBC ANTIBODY SCREEN: CPT

## 2021-12-22 PROCEDURE — 87086 URINE CULTURE/COLONY COUNT: CPT

## 2021-12-22 PROCEDURE — 97110 THERAPEUTIC EXERCISES: CPT

## 2021-12-22 PROCEDURE — 97161 PT EVAL LOW COMPLEX 20 MIN: CPT

## 2021-12-22 PROCEDURE — 80053 COMPREHEN METABOLIC PANEL: CPT

## 2021-12-22 PROCEDURE — 84100 ASSAY OF PHOSPHORUS: CPT

## 2021-12-22 PROCEDURE — 81003 URINALYSIS AUTO W/O SCOPE: CPT

## 2021-12-22 PROCEDURE — 86900 BLOOD TYPING SEROLOGIC ABO: CPT

## 2021-12-22 PROCEDURE — 86901 BLOOD TYPING SEROLOGIC RH(D): CPT

## 2021-12-22 PROCEDURE — 93970 EXTREMITY STUDY: CPT

## 2021-12-22 PROCEDURE — 83690 ASSAY OF LIPASE: CPT

## 2021-12-22 PROCEDURE — 97535 SELF CARE MNGMENT TRAINING: CPT

## 2021-12-22 PROCEDURE — U0005: CPT

## 2021-12-22 PROCEDURE — 85045 AUTOMATED RETICULOCYTE COUNT: CPT

## 2021-12-22 PROCEDURE — 84443 ASSAY THYROID STIM HORMONE: CPT

## 2021-12-22 PROCEDURE — 83735 ASSAY OF MAGNESIUM: CPT

## 2021-12-22 PROCEDURE — 83540 ASSAY OF IRON: CPT

## 2021-12-22 PROCEDURE — 74177 CT ABD & PELVIS W/CONTRAST: CPT | Mod: MG

## 2021-12-22 PROCEDURE — 86140 C-REACTIVE PROTEIN: CPT

## 2021-12-22 PROCEDURE — 85652 RBC SED RATE AUTOMATED: CPT

## 2021-12-22 PROCEDURE — 84439 ASSAY OF FREE THYROXINE: CPT

## 2021-12-22 PROCEDURE — 71045 X-RAY EXAM CHEST 1 VIEW: CPT

## 2021-12-22 PROCEDURE — 81001 URINALYSIS AUTO W/SCOPE: CPT

## 2021-12-22 PROCEDURE — 84145 PROCALCITONIN (PCT): CPT

## 2021-12-22 PROCEDURE — 85027 COMPLETE CBC AUTOMATED: CPT

## 2021-12-22 PROCEDURE — 85025 COMPLETE CBC W/AUTO DIFF WBC: CPT

## 2021-12-22 PROCEDURE — 82607 VITAMIN B-12: CPT

## 2021-12-22 PROCEDURE — 70450 CT HEAD/BRAIN W/O DYE: CPT | Mod: MG

## 2021-12-22 PROCEDURE — 93306 TTE W/DOPPLER COMPLETE: CPT

## 2022-01-08 VITALS
HEIGHT: 71 IN | DIASTOLIC BLOOD PRESSURE: 73 MMHG | WEIGHT: 160.06 LBS | TEMPERATURE: 98 F | HEART RATE: 85 BPM | OXYGEN SATURATION: 100 % | RESPIRATION RATE: 17 BRPM | SYSTOLIC BLOOD PRESSURE: 148 MMHG

## 2022-01-08 LAB
ALBUMIN SERPL ELPH-MCNC: 3.8 G/DL — SIGNIFICANT CHANGE UP (ref 3.3–5)
ALP SERPL-CCNC: 71 U/L — SIGNIFICANT CHANGE UP (ref 40–120)
ALT FLD-CCNC: 90 U/L — HIGH (ref 10–45)
ANION GAP SERPL CALC-SCNC: 13 MMOL/L — SIGNIFICANT CHANGE UP (ref 5–17)
APPEARANCE UR: CLEAR — SIGNIFICANT CHANGE UP
APTT BLD: 29.1 SEC — SIGNIFICANT CHANGE UP (ref 27.5–35.5)
AST SERPL-CCNC: 265 U/L — HIGH (ref 10–40)
BACTERIA # UR AUTO: PRESENT /HPF
BASE EXCESS BLDV CALC-SCNC: -3.6 MMOL/L — LOW (ref -2–3)
BASOPHILS # BLD AUTO: 0.02 K/UL — SIGNIFICANT CHANGE UP (ref 0–0.2)
BASOPHILS NFR BLD AUTO: 0.2 % — SIGNIFICANT CHANGE UP (ref 0–2)
BILIRUB SERPL-MCNC: 0.8 MG/DL — SIGNIFICANT CHANGE UP (ref 0.2–1.2)
BILIRUB UR-MCNC: NEGATIVE — SIGNIFICANT CHANGE UP
BUN SERPL-MCNC: 46 MG/DL — HIGH (ref 7–23)
CA-I SERPL-SCNC: 1.23 MMOL/L — SIGNIFICANT CHANGE UP (ref 1.15–1.33)
CALCIUM SERPL-MCNC: 9 MG/DL — SIGNIFICANT CHANGE UP (ref 8.4–10.5)
CHLORIDE SERPL-SCNC: 106 MMOL/L — SIGNIFICANT CHANGE UP (ref 96–108)
CK SERPL-CCNC: 9646 U/L — HIGH (ref 30–200)
CO2 BLDV-SCNC: 22.7 MMOL/L — SIGNIFICANT CHANGE UP (ref 22–26)
CO2 SERPL-SCNC: 21 MMOL/L — LOW (ref 22–31)
COLOR SPEC: YELLOW — SIGNIFICANT CHANGE UP
CREAT SERPL-MCNC: 0.95 MG/DL — SIGNIFICANT CHANGE UP (ref 0.5–1.3)
DIFF PNL FLD: ABNORMAL
EOSINOPHIL # BLD AUTO: 0.01 K/UL — SIGNIFICANT CHANGE UP (ref 0–0.5)
EOSINOPHIL NFR BLD AUTO: 0.1 % — SIGNIFICANT CHANGE UP (ref 0–6)
EPI CELLS # UR: SIGNIFICANT CHANGE UP /HPF (ref 0–5)
FLUAV AG NPH QL: SIGNIFICANT CHANGE UP
FLUBV AG NPH QL: SIGNIFICANT CHANGE UP
GAS PNL BLDV: 137 MMOL/L — SIGNIFICANT CHANGE UP (ref 136–145)
GAS PNL BLDV: SIGNIFICANT CHANGE UP
GAS PNL BLDV: SIGNIFICANT CHANGE UP
GLUCOSE SERPL-MCNC: 172 MG/DL — HIGH (ref 70–99)
GLUCOSE UR QL: NEGATIVE — SIGNIFICANT CHANGE UP
HCO3 BLDV-SCNC: 22 MMOL/L — SIGNIFICANT CHANGE UP (ref 22–29)
HCT VFR BLD CALC: 36.4 % — LOW (ref 39–50)
HGB BLD-MCNC: 12.1 G/DL — LOW (ref 13–17)
IMM GRANULOCYTES NFR BLD AUTO: 0.4 % — SIGNIFICANT CHANGE UP (ref 0–1.5)
INR BLD: 1.18 — HIGH (ref 0.88–1.16)
KETONES UR-MCNC: 15 MG/DL
LACTATE SERPL-SCNC: 1.6 MMOL/L — SIGNIFICANT CHANGE UP (ref 0.5–2)
LEUKOCYTE ESTERASE UR-ACNC: NEGATIVE — SIGNIFICANT CHANGE UP
LYMPHOCYTES # BLD AUTO: 0.9 K/UL — LOW (ref 1–3.3)
LYMPHOCYTES # BLD AUTO: 6.9 % — LOW (ref 13–44)
MCHC RBC-ENTMCNC: 31.6 PG — SIGNIFICANT CHANGE UP (ref 27–34)
MCHC RBC-ENTMCNC: 33.2 GM/DL — SIGNIFICANT CHANGE UP (ref 32–36)
MCV RBC AUTO: 95 FL — SIGNIFICANT CHANGE UP (ref 80–100)
MONOCYTES # BLD AUTO: 0.93 K/UL — HIGH (ref 0–0.9)
MONOCYTES NFR BLD AUTO: 7.2 % — SIGNIFICANT CHANGE UP (ref 2–14)
NEUTROPHILS # BLD AUTO: 11.04 K/UL — HIGH (ref 1.8–7.4)
NEUTROPHILS NFR BLD AUTO: 85.2 % — HIGH (ref 43–77)
NITRITE UR-MCNC: NEGATIVE — SIGNIFICANT CHANGE UP
NRBC # BLD: 0 /100 WBCS — SIGNIFICANT CHANGE UP (ref 0–0)
PCO2 BLDV: 38 MMHG — LOW (ref 42–55)
PH BLDV: 7.36 — SIGNIFICANT CHANGE UP (ref 7.32–7.43)
PH UR: 5.5 — SIGNIFICANT CHANGE UP (ref 5–8)
PLATELET # BLD AUTO: 299 K/UL — SIGNIFICANT CHANGE UP (ref 150–400)
PO2 BLDV: 29 MMHG — SIGNIFICANT CHANGE UP (ref 25–45)
POTASSIUM BLDV-SCNC: 4 MMOL/L — SIGNIFICANT CHANGE UP (ref 3.5–5.1)
POTASSIUM SERPL-MCNC: 4 MMOL/L — SIGNIFICANT CHANGE UP (ref 3.5–5.3)
POTASSIUM SERPL-SCNC: 4 MMOL/L — SIGNIFICANT CHANGE UP (ref 3.5–5.3)
PROT SERPL-MCNC: 6.9 G/DL — SIGNIFICANT CHANGE UP (ref 6–8.3)
PROT UR-MCNC: ABNORMAL MG/DL
PROTHROM AB SERPL-ACNC: 14 SEC — HIGH (ref 10.6–13.6)
RBC # BLD: 3.83 M/UL — LOW (ref 4.2–5.8)
RBC # FLD: 12.9 % — SIGNIFICANT CHANGE UP (ref 10.3–14.5)
RBC CASTS # UR COMP ASSIST: < 5 /HPF — SIGNIFICANT CHANGE UP
RSV RNA NPH QL NAA+NON-PROBE: SIGNIFICANT CHANGE UP
SAO2 % BLDV: 51.4 % — LOW (ref 67–88)
SARS-COV-2 RNA SPEC QL NAA+PROBE: SIGNIFICANT CHANGE UP
SODIUM SERPL-SCNC: 140 MMOL/L — SIGNIFICANT CHANGE UP (ref 135–145)
SP GR SPEC: >=1.03 — SIGNIFICANT CHANGE UP (ref 1–1.03)
TRI-PHOS CRY UR QL COMP ASSIST: ABNORMAL /HPF
UROBILINOGEN FLD QL: 0.2 E.U./DL — SIGNIFICANT CHANGE UP
WBC # BLD: 12.95 K/UL — HIGH (ref 3.8–10.5)
WBC # FLD AUTO: 12.95 K/UL — HIGH (ref 3.8–10.5)
WBC UR QL: ABNORMAL /HPF

## 2022-01-08 PROCEDURE — 99285 EMERGENCY DEPT VISIT HI MDM: CPT

## 2022-01-08 PROCEDURE — 72170 X-RAY EXAM OF PELVIS: CPT | Mod: 26

## 2022-01-08 PROCEDURE — 71045 X-RAY EXAM CHEST 1 VIEW: CPT | Mod: 26

## 2022-01-08 PROCEDURE — 70450 CT HEAD/BRAIN W/O DYE: CPT | Mod: 26,MA

## 2022-01-08 PROCEDURE — 74177 CT ABD & PELVIS W/CONTRAST: CPT | Mod: 26,MA

## 2022-01-08 PROCEDURE — 72125 CT NECK SPINE W/O DYE: CPT | Mod: 26,MA

## 2022-01-08 RX ORDER — SODIUM CHLORIDE 9 MG/ML
1000 INJECTION INTRAMUSCULAR; INTRAVENOUS; SUBCUTANEOUS ONCE
Refills: 0 | Status: COMPLETED | OUTPATIENT
Start: 2022-01-08 | End: 2022-01-08

## 2022-01-08 RX ORDER — VANCOMYCIN HCL 1 G
1250 VIAL (EA) INTRAVENOUS ONCE
Refills: 0 | Status: COMPLETED | OUTPATIENT
Start: 2022-01-08 | End: 2022-01-08

## 2022-01-08 RX ORDER — IOHEXOL 300 MG/ML
30 INJECTION, SOLUTION INTRAVENOUS ONCE
Refills: 0 | Status: COMPLETED | OUTPATIENT
Start: 2022-01-08 | End: 2022-01-08

## 2022-01-08 RX ORDER — VANCOMYCIN HCL 1 G
1000 VIAL (EA) INTRAVENOUS ONCE
Refills: 0 | Status: DISCONTINUED | OUTPATIENT
Start: 2022-01-08 | End: 2022-01-08

## 2022-01-08 RX ADMIN — IOHEXOL 30 MILLILITER(S): 300 INJECTION, SOLUTION INTRAVENOUS at 20:03

## 2022-01-08 RX ADMIN — SODIUM CHLORIDE 1000 MILLILITER(S): 9 INJECTION INTRAMUSCULAR; INTRAVENOUS; SUBCUTANEOUS at 19:59

## 2022-01-08 RX ADMIN — SODIUM CHLORIDE 1000 MILLILITER(S): 9 INJECTION INTRAMUSCULAR; INTRAVENOUS; SUBCUTANEOUS at 21:09

## 2022-01-08 RX ADMIN — Medication 166.67 MILLIGRAM(S): at 20:39

## 2022-01-08 NOTE — ED ADULT NURSE NOTE - CARDIO ASSESSMENT
Message to provider:  Kevan from [facility] called requesting clarification on the Pregablin dose change and quantity. Please contact pharmacy.    [] Writer advised caller of callback from clinic within 24-72 hours   ---

## 2022-01-08 NOTE — ED ADULT NURSE NOTE - PAIN RATING/NUMBER SCALE (0-10): REST
- Will consider option of Mitraclip give his severe mitral regurgitation.  - structural notified, will see patient 3

## 2022-01-08 NOTE — ED ADULT NURSE NOTE - NSIMPLEMENTINTERV_GEN_ALL_ED
Implemented All Fall Risk Interventions:  [place] to call system. Call bell, personal items and telephone within reach. Instruct patient to call for assistance. Room bathroom lighting operational. Non-slip footwear when patient is off stretcher. Physically safe environment: no spills, clutter or unnecessary equipment. Stretcher in lowest position, wheels locked, appropriate side rails in place. Provide visual cue, wrist band, yellow gown, etc. Monitor gait and stability. Monitor for mental status changes and reorient to person, place, and time. Review medications for side effects contributing to fall risk. Reinforce activity limits and safety measures with patient and family.

## 2022-01-08 NOTE — ED ADULT NURSE NOTE - OBJECTIVE STATEMENT
Presents from home for reported inability to properly care for self, patient requesting help with ADLs and home situation. Denies recent trauma/falls/fevers/chills/cough/sob/cp/exposure to sick individuals. Reports has walker at home that he has not been using, states feels unsafe walking now. C/o mild pain to BLE, chronic exacerbation.

## 2022-01-08 NOTE — ED PROVIDER NOTE - CLINICAL SUMMARY MEDICAL DECISION MAKING FREE TEXT BOX
81 M no significant pmh, lives alone and uses walker referred to ED by friend Dr. Joel for failure to thrive/dehydration, pt c/o diarrhea and b/l leg pain for unclear period of time. 81 M no significant pmh, lives alone and uses walker referred to ED by friend Dr. Joel for failure to thrive/dehydration, pt c/o diarrhea and b/l leg pain for unclear period of time.  pt disheveled appearing, VSS, alert/oriented, no focal deficits, lungs ctab, hrrr, abd distended w/ suprapubic ttp, no peritoneal signs, b/l LEs w/ dry scaling skin and few abrasions, R>L e/ erythema and warmth, no discharge/fluctuance, circumferential R lower leg ttp over area of erythema, dried feces noted on b/l feet, moves all ext w/ palpable distal pulses.  + FTT, concern for uti vs colitis, clinically RLE cellulitis, r/o electrolyte abnormalities, r/o traumatic injury as ? fall.  will obtain labs, EKG, CXR, pelvis xray, CT head/cspine and CT a/p, give vanco for cellulitis

## 2022-01-08 NOTE — ED ADULT TRIAGE NOTE - CHIEF COMPLAINT QUOTE
neighbors called ems because patient cannot take care of himself, lives alone in a filthy apartment.  On ems arrival patient was found on the floor.  Patient states he has been on the floor for days. Patient c/o pain to bilateral feet.  FS 85

## 2022-01-08 NOTE — ED PROVIDER NOTE - CARE PLAN
1 Principal Discharge DX:	Failure to thrive in adult  Secondary Diagnosis:	Cellulitis of right leg  Secondary Diagnosis:	Diarrhea

## 2022-01-08 NOTE — ED ADULT NURSE NOTE - PRO INTERPRETER NEED 2
Subjective     REASON FOR FOLLOW UP:   1. CHRONIC LEUKOPENIA/ Neutropenia  2. IgA Lambda MGUS  3. Ulcerative Colitis  4. Positive ALEC    HISTORY OF PRESENT ILLNESS:  The patient is a 82 y.o. year old female who is here for follow up of the above noted  issuse.  She had actually been evaluated by Dr. Choi in our practice in 2014 for leukopenia.  At that time Dr. Choi had sent a peripheral blood flow cytometry which did not show any abnormal populations of white cells.  Ms. Villeda reports that she is actually feeling well in general.  She's not had any recurring infections.  Her white count remains low but stable.  Her platelets and hemoglobin are unremarkable.    She had a repeat serum protein electrophoresis performed last week that showed stable IgA monoclonal spike and adequate iron stores.  She remains on oral iron supplementation.     History of Present Illness       Current Outpatient Prescriptions on File Prior to Visit   Medication Sig Dispense Refill   • Acetaminophen (PAIN RELIEVER PO) Take  by mouth.     • calcium carbonate (OS-ALIDA) 600 MG tablet Take 600 mg by mouth Daily.     • ferrous sulfate 325 (65 FE) MG tablet Take 1 tablet by mouth Daily With Breakfast.     • furosemide (LASIX) 20 MG tablet Take 2 tablets by mouth Every Morning. 20mg in the  tablet 1   • gabapentin (NEURONTIN) 300 MG capsule Take 1 capsule by mouth 2 (Two) Times a Day. 180 capsule 1   • mesalamine (LIALDA) 1.2 g EC tablet Take 4 tablets by mouth Daily. 360 tablet 3   • mometasone (ELOCON) 0.1 % ointment Apply 1 application topically 2 (Two) Times a Day.     • omeprazole (priLOSEC) 20 MG capsule Take 1 capsule by mouth Daily. 90 capsule 3   • vitamin B-12 (CYANOCOBALAMIN) 1000 MCG tablet Take 1,000 mcg by mouth Daily.     • warfarin (COUMADIN) 5 MG tablet Take 1 tablet by mouth Daily. 90 tablet 1     No current facility-administered medications on file prior to visit.         ALLERGIES:    Allergies   Allergen Reactions    • Amitriptyline    • Amoxicillin-Pot Clavulanate    • Aspirin    • Bactrim [Sulfamethoxazole-Trimethoprim]    • Codeine    • Iodinated Diagnostic Agents    • Latex    • Naproxen    • Nsaids    • Soma Compound With Codeine [Carisoprodol-Aspirin-Codeine]    • Sulfa Antibiotics           Review of Systems   Constitutional: Negative for activity change, appetite change, fatigue, fever and unexpected weight change.   HENT: Negative for hearing loss, nosebleeds, trouble swallowing and voice change.    Eyes: Negative for visual disturbance.   Respiratory: Negative for cough, shortness of breath and wheezing.    Cardiovascular: Negative for chest pain and palpitations.   Gastrointestinal: Negative for abdominal pain, nausea and vomiting.   Genitourinary: Negative for difficulty urinating, frequency, hematuria and urgency.   Musculoskeletal: Positive for arthralgias and gait problem. Negative for back pain and neck pain.   Skin: Negative for rash.   Neurological: Negative for dizziness, seizures, syncope and headaches.        Balance issues.   Hematological: Negative for adenopathy. Does not bruise/bleed easily.   Psychiatric/Behavioral: Negative for behavioral problems. The patient is not nervous/anxious.       Objective     There were no vitals filed for this visit.  Current Status 1/22/2018   ECOG score 0       Physical Exam   Constitutional: She is oriented to person, place, and time. She appears well-developed and well-nourished. No distress.   HENT:   Head: Normocephalic.   Eyes: Conjunctivae and EOM are normal. Pupils are equal, round, and reactive to light. No scleral icterus.   Neck: Normal range of motion. Neck supple. No JVD present. No thyromegaly present.   Cardiovascular: Normal rate and regular rhythm.  Exam reveals no gallop and no friction rub.    Murmur heard.  Pulmonary/Chest: Effort normal and breath sounds normal. She has no wheezes. She has no rales.   Abdominal: Soft. She exhibits no distension and  no mass. There is no tenderness.   Musculoskeletal: Normal range of motion. She exhibits no edema or deformity.   Lymphadenopathy:     She has no cervical adenopathy.   Neurological: She is alert and oriented to person, place, and time. She has normal reflexes. No cranial nerve deficit.   Ambulates with a cane.   Skin: Skin is warm and dry. No rash noted. No erythema.   Psychiatric: She has a normal mood and affect. Her behavior is normal. Judgment normal.         RECENT LABS:  Hematology WBC   Date Value Ref Range Status   07/09/2018 3.19 (L) 4.00 - 10.00 10*3/mm3 Final     RBC   Date Value Ref Range Status   07/09/2018 3.67 (L) 3.90 - 5.00 10*6/mm3 Final     Hemoglobin   Date Value Ref Range Status   07/09/2018 11.8 11.5 - 14.9 g/dL Final     Hematocrit   Date Value Ref Range Status   07/09/2018 36.8 34.0 - 45.0 % Final     Platelets   Date Value Ref Range Status   07/09/2018 147 (L) 150 - 375 10*3/mm3 Final        Lab Results   Component Value Date    GLUCOSE 94 07/09/2018    BUN 25 (H) 07/09/2018    CREATININE 0.97 07/09/2018    EGFRIFNONA 51 (L) 01/02/2018    EGFRIFAFRI 66 07/09/2018    BCR 25.8 07/09/2018    K 4.3 07/09/2018    CO2 27.3 07/09/2018    CALCIUM 10.4 (H) 07/09/2018    PROTENTOTREF 7.1 07/09/2018    ALBUMIN 3.70 07/09/2018    ALBUMIN 3.5 07/09/2018    LABIL2 1.0 (L) 07/09/2018    LABIL2 1.0 07/09/2018    AST 16 07/09/2018    ALT 6 07/09/2018       Lab Results   Component Value Date    IRON 93 07/09/2018    TIBC 298 07/09/2018    FERRITIN 90.10 07/09/2018       SPEP/DMITRIY 7/9/2018  IgG 700 - 1600 mg/dL 1359    IgA 64 - 422 mg/dL 723     IgM 26 - 217 mg/dL 62    Total Protein 6.0 - 8.5 g/dL 7.1    Albumin 2.9 - 4.4 g/dL 3.5    Alpha-1-Globulin 0.0 - 0.4 g/dL 0.3    Alpha-2-Globulin 0.4 - 1.0 g/dL 0.6    Beta Globulin 0.7 - 1.3 g/dL 1.3    Gamma Globulin 0.4 - 1.8 g/dL 1.4    M-Kip Not Observed g/dL 0.4     Globulin 2.2 - 3.9 g/dL 3.6    A/G Ratio 0.7 - 1.7 1.0    Immunofixation Reflex, Serum  Comment     Comment: Immunofixation shows IgA monoclonal protein with lambda light chain   specificity.         BONE MARROW BIOPSY 9/13/2017  No evidence malignancy or plasma cell excess. Absent stainable iron.    Assessment/Plan   1.  Chronic leukopenia/neutropenia without any recurring infections.  As noted above she been evaluated in our office in 2014 and at that time peripheral blood flow cytometry was normal.  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  Her counts remain low but stable. Bone marrow exam on 9/13/2017 did not show any evidence of marrow pathology other than absent stainable iron.  2.  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  Her IgA level and lambda light chains have been stable over the past 6 months.  With her bone marrow showing no excess of plasma cells we will just plan on observing this for now with repeat serum protein studies every 6 months.   3.  Ulcerative colitis.  She continues to follow-up with her gastroenterologist Dr. English  4.  Positive ALEC on previous lab work.  It is possible she could also have some degree of autoimmune neutropenia.  5.  Absent iron on the bone marrow examination.  We suspect patient is becoming iron deficient due to chronic GI blood loss from ulcerative colitis and chronic Coumadin therapy.    Plan    1.  We discussed the results of the lab studies with the patient and  reassured her that monoclonal gammopathy and leukopenia are stable.  2.  She'll be scheduled for return visit in 6 months and will undergo repeat labs one week prior to that visit including CBC, serum chemistries, iron studies, and serum protein electrophoresis with immunofixation.      English

## 2022-01-08 NOTE — ED PROVIDER NOTE - ATTENDING CONTRIBUTION TO CARE
80 yo m w no pmhx presents to ED with concern for failure to thrive at home - sent to ED by Dr. Joel.  Patient complains of bilateral LE pain on arrival to ED.  Per EMS report, patient's home is "unlivable."  Suspected recent fall, was found today down by EMS.  Dr. Joel sent EMS to his home and he was found down when he missed a scheduled appointment today.  Notes loose, non bloody stools.  On exam, patient is non toxic in appearance, disheveled/unkept appearing.  Alert and oriented x3, skin changes with abrasions and peeling to bilateral LEs with pretibial erythema to RLE.  Will obtain labs, hydrate, administer abx for cellulitis and plan for admission with case management/social work involvement.

## 2022-01-08 NOTE — ED PROVIDER NOTE - DATE/TIME 1
Occupational Therapy  Facility/Department: [facility] 5T ORTHO/NEURO  Daily Treatment Note  NAME: Lianne Diggs  : 1956  MRN: [id number]    Date of Service: 2021    Discharge Recommendations:  Lianne Diggs scored a 17/24 on the AM-PAC ADL Inpatient form. Current research shows that an AM-PAC score of 17 or less is typically not associated with a discharge to the patient's home setting. Based on the patient's AM-PAC score and their current ADL deficits, it is recommended that the patient have 3-5 sessions per week of Occupational Therapy at d/c to increase the patient's independence. Please see assessment section for further patient specific details. If patient discharges prior to next session this note will serve as a discharge summary. Please see below for the latest assessment towards goals. OT Equipment Recommendations  Equipment Needed:  (if pt d/c home then LHS, reacher, sock aid)  Other: defer to next care facility    Assessment   Performance deficits / Impairments: Decreased functional mobility ; Decreased ADL status; Decreased endurance  Assessment: Pt progressing with therapy session, SBA for all functional mobility and transfers this date. Pt SBA for LB dressing. Goals 4/4 met. New Goals written. Pt would benefit from inpt therapy at d/c to maximize functional independence and safety. Continue with POC. Treatment Diagnosis: Decreased activity tolerance, impaired ADLs and mobility 2/2 Lumbar sx and revision of abx wound  Prognosis: Good  OT Education: OT Role;Plan of Care;ADL Adaptive Strategies;Transfer Training  Patient Education: verb understanding  REQUIRES OT FOLLOW UP: Yes  Activity Tolerance  Activity Tolerance: Patient Tolerated treatment well  Safety Devices  Type of devices: Call light within reach; Chair alarm in place;Nurse notified; Left in chair; All fall risk precautions in place         Patient Diagnosis(es): There were no encounter diagnoses.       has a past medical history of sink. Pt in stance ~6 min Supervision for oral care/wash face/brush hair. Pt ambulated SBA ~18 ft to recliner with rw, stand to sit SBA. Pt with reacher doffed  socks and threaded new brief. Pt donned new  socks with sock aid. Pt in stance SBA to pull up brief (use of reacher). After seated reach break pt ambulated ~60 ft in hallway SBA, stand to sit SBA for seated rest break. Pt ~60 ft back to room SBA with rw. Pt stand to sit SBA. Call light in reach and chair alarm on. Pain Assessment  Pain Level: 6  Pain Type: Acute pain  Pain Location: Leg R side   Pre Treatment Pain Screening  Intervention List: Patient able to continue with treatment;Nurse/Physician notified;Nurse called to administer meds  Comments / Details: Pt also nauseous and RN notified and administered meds    Orientation  Orientation  Overall Orientation Status: Within Functional Limits    Objective    ADL  Equipment Provided: Reacher;Sock aid; Other (comment) (pt personal toilet aid)  Grooming: Supervision  LE Dressing: Stand by assistance  Toileting: Minimal assistance (to pull brief down/doff)        Balance  Sitting Balance: Modified independent   Standing Balance: Stand by assistance (Supervision/SBA)  Standing Balance  Time: ~15 min total  Activity: to/from bathroom, toilet transfer/toileting x 2 trials, in stance to groom, LB clothing mgmt  Functional Mobility  Functional - Mobility Device: Rolling Walker  Activity: To/from bathroom; Other  Toilet Transfers  Toilet - Technique: Ambulating  Equipment Used: Raised toilet seat with rails  Toilet Transfer: Stand by assistance  Bed mobility  Supine to Sit: Supervision  Transfers  Sit to stand: Stand by assistance  Stand to sit: Stand by assistance     Cognition  Overall Cognitive Status: WNL     Plan   Plan  Times per week: 5-7x  Times per day: Daily  Current Treatment Recommendations: Balance Training, Functional Mobility Training, Endurance Training, Self-Care / ADL, Safety Education & Training, Equipment Evaluation, Education, & procurement, Patient/Caregiver Education & Training, Pain Management    AM-PAC Score  AM-PAC Inpatient Daily Activity Raw Score: 17 (08/11/21 0850)  AM-PAC Inpatient ADL T-Scale Score : 37.26 (08/11/21 0850)  ADL Inpatient CMS 0-100% Score: 50.11 (08/11/21 0850)  ADL Inpatient CMS G-Code Modifier : CK (08/11/21 0850)    Goals  Goals 4/4 -met New goals written   Short term goals  Time Frame for Short term goals: by D/C    Short term goal 1: Toileting and toilet transfer SBA, AE as needed - goal met 8/11    New goal  [place] transfers with Mod independence with RTS prn    Short term goal 2: Lower body dressing SBA, AE as needed - goal met brief/socks 8/11    New goal  LE Dressing with Mod independence w/ AE prn     Short term goal 3: Functional transfers to/from bed, chairs SBA - goal met 8/11    Short term goal 4: Static and dynamic stance during ADLs SBA - goal met 8/11   New Goal  Stance x 7 mins with Supervision for ADLS/ IADLs.        Therapy Time   Individual Concurrent Group Co-treatment   Time In 4478         Time Out 0832         Minutes 38         Timed Code Treatment Minutes: [address] [facility],[address], [place] [id number]      Aubrey Salcido MS, OTR/L #[id number] 09-Jan-2022 00:26

## 2022-01-08 NOTE — ED PROVIDER NOTE - OBJECTIVE STATEMENT
81 M no significant pmh, lives alone and uses walker referred to ED by friend Dr. Joel for failure to thrive/dehydration, pt c/o diarrhea and b/l leg pain for unclear period of time.  pt is poor historian and difficult to obtain history from but reports both legs R>L have been hurting him for a while, having difficulty walking 2/2 pain- no numbness/weakness.  report he has slid out of bed and ended up on floor a few times due to pain in both legs- unsure if + head trauma, unknown last fall.  Also 81 M no significant pmh, lives alone and uses walker referred to ED by friend Dr. Joel for failure to thrive/dehydration, pt c/o diarrhea and b/l leg pain for unclear period of time.  pt is poor historian and difficult to obtain history from but reports both legs R>L have been hurting him for a while, having difficulty walking 2/2 pain- no numbness/weakness.  report he has slid out of bed and ended up on floor a few times due to pain in both legs- unsure if + head trauma, unknown last fall.  Also reports loose nonbloody watery stools for "long time" and abdominal distention but denies abd pain.  As per EMS pt apartment unlivable, pt found on ground and unable to get around w/ his walker.  Denies f/c, headache, dizziness, fainting, neck/back pain, chest pain, sob, uri sxs, nv, constipation, melena, hematochezia, urinary sxs, pain in UEs, use of AC

## 2022-01-08 NOTE — ED PROVIDER NOTE - PROGRESS NOTE DETAILS
labs w/ leukocytosis, no other gross abnormality, normal lactate, UA no infection. cult sent.  CT head/cspine no acute pathology.  CT a/p w/ enlarged prostate causing outlet obstruction w/ 1600cc urine retention, + colitis.  karimi placed and 1400cc outpt.  discussed results w/ Dr. Joel, will follow and see pt in AM.  will admit to medicine for FTT, RLE cellulitis, colitis and likely JONATHAN placement labs w/ leukocytosis, no other gross abnormality, normal lactate, UA no infection. cult sent.  CT head/cspine no acute pathology.  CT a/p w/ enlarged prostate causing outlet obstruction w/ 1600cc urine retention, + colitis.  karimi placed and 1400cc outpt.  discussed results w/ Dr. Joel, will follow and see pt in AM.  will admit to medicine for FTT, RLE cellulitis, colitis and likely JONATHAN placement.   d/w TEMITOPE and will add zosyn for colitis (?infect vs inflamm) and obtain b/l LE dopplers to r/o dvt Purse String (Intermediate) Text: Given the location of the defect and the characteristics of the surrounding skin a purse string intermediate closure was deemed most appropriate.  Undermining was performed circumferentially around the surgical defect.  A purse string suture was then placed and tightened.

## 2022-01-09 ENCOUNTER — INPATIENT (INPATIENT)
Facility: HOSPITAL | Age: 82
LOS: 1 days | Discharge: HOME HEALTH | DRG: 565 | End: 2022-01-11
Attending: INTERNAL MEDICINE | Admitting: INTERNAL MEDICINE
Payer: MEDICARE

## 2022-01-09 DIAGNOSIS — R09.89 OTHER SPECIFIED SYMPTOMS AND SIGNS INVOLVING THE CIRCULATORY AND RESPIRATORY SYSTEMS: ICD-10-CM

## 2022-01-09 DIAGNOSIS — M62.82 RHABDOMYOLYSIS: ICD-10-CM

## 2022-01-09 DIAGNOSIS — R74.01 ELEVATION OF LEVELS OF LIVER TRANSAMINASE LEVELS: ICD-10-CM

## 2022-01-09 DIAGNOSIS — R63.8 OTHER SYMPTOMS AND SIGNS CONCERNING FOOD AND FLUID INTAKE: ICD-10-CM
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
## 2022-01-09 DIAGNOSIS — L03.90 CELLULITIS, UNSPECIFIED: ICD-10-CM

## 2022-01-09 DIAGNOSIS — Z98.890 OTHER SPECIFIED POSTPROCEDURAL STATES: Chronic | ICD-10-CM

## 2022-01-09 DIAGNOSIS — K52.9 NONINFECTIVE GASTROENTERITIS AND COLITIS, UNSPECIFIED: ICD-10-CM

## 2022-01-09 DIAGNOSIS — R33.9 RETENTION OF URINE, UNSPECIFIED: ICD-10-CM

## 2022-01-09 DIAGNOSIS — N40.0 BENIGN PROSTATIC HYPERPLASIA WITHOUT LOWER URINARY TRACT SYMPTOMS: ICD-10-CM

## 2022-01-09 PROBLEM — M50.30 OTHER CERVICAL DISC DEGENERATION, UNSPECIFIED CERVICAL REGION: Chronic | Status: ACTIVE | Noted: 2021-11-14

## 2022-01-09 LAB
ALBUMIN SERPL ELPH-MCNC: 2.9 G/DL — LOW (ref 3.3–5)
ALP SERPL-CCNC: 58 U/L — SIGNIFICANT CHANGE UP (ref 40–120)
ALT FLD-CCNC: 77 U/L — HIGH (ref 10–45)
ANION GAP SERPL CALC-SCNC: 12 MMOL/L — SIGNIFICANT CHANGE UP (ref 5–17)
AST SERPL-CCNC: 208 U/L — HIGH (ref 10–40)
BASOPHILS # BLD AUTO: 0.01 K/UL — SIGNIFICANT CHANGE UP (ref 0–0.2)
BASOPHILS NFR BLD AUTO: 0.1 % — SIGNIFICANT CHANGE UP (ref 0–2)
BILIRUB SERPL-MCNC: 0.9 MG/DL — SIGNIFICANT CHANGE UP (ref 0.2–1.2)
BUN SERPL-MCNC: 38 MG/DL — HIGH (ref 7–23)
CALCIUM SERPL-MCNC: 8.5 MG/DL — SIGNIFICANT CHANGE UP (ref 8.4–10.5)
CHLORIDE SERPL-SCNC: 108 MMOL/L — SIGNIFICANT CHANGE UP (ref 96–108)
CK SERPL-CCNC: 6112 U/L — HIGH (ref 30–200)
CO2 SERPL-SCNC: 17 MMOL/L — LOW (ref 22–31)
CREAT SERPL-MCNC: 0.94 MG/DL — SIGNIFICANT CHANGE UP (ref 0.5–1.3)
EOSINOPHIL # BLD AUTO: 0.07 K/UL — SIGNIFICANT CHANGE UP (ref 0–0.5)
EOSINOPHIL NFR BLD AUTO: 0.8 % — SIGNIFICANT CHANGE UP (ref 0–6)
GLUCOSE SERPL-MCNC: 120 MG/DL — HIGH (ref 70–99)
HAV IGM SER-ACNC: SIGNIFICANT CHANGE UP
HBV CORE IGM SER-ACNC: SIGNIFICANT CHANGE UP
HBV SURFACE AG SER-ACNC: SIGNIFICANT CHANGE UP
HCT VFR BLD CALC: 30.3 % — LOW (ref 39–50)
HCV AB S/CO SERPL IA: 0.04 S/CO — SIGNIFICANT CHANGE UP
HCV AB SERPL-IMP: SIGNIFICANT CHANGE UP
HGB BLD-MCNC: 10.4 G/DL — LOW (ref 13–17)
IMM GRANULOCYTES NFR BLD AUTO: 0.4 % — SIGNIFICANT CHANGE UP (ref 0–1.5)
LYMPHOCYTES # BLD AUTO: 0.67 K/UL — LOW (ref 1–3.3)
LYMPHOCYTES # BLD AUTO: 7.5 % — LOW (ref 13–44)
MAGNESIUM SERPL-MCNC: 2.2 MG/DL — SIGNIFICANT CHANGE UP (ref 1.6–2.6)
MCHC RBC-ENTMCNC: 32.4 PG — SIGNIFICANT CHANGE UP (ref 27–34)
MCHC RBC-ENTMCNC: 34.3 GM/DL — SIGNIFICANT CHANGE UP (ref 32–36)
MCV RBC AUTO: 94.4 FL — SIGNIFICANT CHANGE UP (ref 80–100)
MONOCYTES # BLD AUTO: 0.91 K/UL — HIGH (ref 0–0.9)
MONOCYTES NFR BLD AUTO: 10.2 % — SIGNIFICANT CHANGE UP (ref 2–14)
NEUTROPHILS # BLD AUTO: 7.23 K/UL — SIGNIFICANT CHANGE UP (ref 1.8–7.4)
NEUTROPHILS NFR BLD AUTO: 81 % — HIGH (ref 43–77)
NRBC # BLD: 0 /100 WBCS — SIGNIFICANT CHANGE UP (ref 0–0)
PHOSPHATE SERPL-MCNC: 2.4 MG/DL — LOW (ref 2.5–4.5)
PLATELET # BLD AUTO: 247 K/UL — SIGNIFICANT CHANGE UP (ref 150–400)
POTASSIUM SERPL-MCNC: 4.1 MMOL/L — SIGNIFICANT CHANGE UP (ref 3.5–5.3)
POTASSIUM SERPL-SCNC: 4.1 MMOL/L — SIGNIFICANT CHANGE UP (ref 3.5–5.3)
PROT SERPL-MCNC: 5.8 G/DL — LOW (ref 6–8.3)
RBC # BLD: 3.21 M/UL — LOW (ref 4.2–5.8)
RBC # FLD: 13 % — SIGNIFICANT CHANGE UP (ref 10.3–14.5)
SODIUM SERPL-SCNC: 137 MMOL/L — SIGNIFICANT CHANGE UP (ref 135–145)
WBC # BLD: 8.93 K/UL — SIGNIFICANT CHANGE UP (ref 3.8–10.5)
WBC # FLD AUTO: 8.93 K/UL — SIGNIFICANT CHANGE UP (ref 3.8–10.5)

## 2022-01-09 PROCEDURE — 93970 EXTREMITY STUDY: CPT | Mod: 26

## 2022-01-09 PROCEDURE — 99223 1ST HOSP IP/OBS HIGH 75: CPT | Mod: GC

## 2022-01-09 RX ORDER — PIPERACILLIN AND TAZOBACTAM 4; .5 G/20ML; G/20ML
3.38 INJECTION, POWDER, LYOPHILIZED, FOR SOLUTION INTRAVENOUS ONCE
Refills: 0 | Status: COMPLETED | OUTPATIENT
Start: 2022-01-09 | End: 2022-01-09

## 2022-01-09 RX ORDER — ACETAMINOPHEN 500 MG
650 TABLET ORAL EVERY 6 HOURS
Refills: 0 | Status: DISCONTINUED | OUTPATIENT
Start: 2022-01-09 | End: 2022-01-11

## 2022-01-09 RX ORDER — INFLUENZA VIRUS VACCINE 15; 15; 15; 15 UG/.5ML; UG/.5ML; UG/.5ML; UG/.5ML
0.7 SUSPENSION INTRAMUSCULAR ONCE
Refills: 0 | Status: DISCONTINUED | OUTPATIENT
Start: 2022-01-09 | End: 2022-01-11

## 2022-01-09 RX ORDER — SODIUM CHLORIDE 9 MG/ML
1000 INJECTION INTRAMUSCULAR; INTRAVENOUS; SUBCUTANEOUS
Refills: 0 | Status: DISCONTINUED | OUTPATIENT
Start: 2022-01-09 | End: 2022-01-09

## 2022-01-09 RX ORDER — PIPERACILLIN AND TAZOBACTAM 4; .5 G/20ML; G/20ML
3.38 INJECTION, POWDER, LYOPHILIZED, FOR SOLUTION INTRAVENOUS EVERY 6 HOURS
Refills: 0 | Status: DISCONTINUED | OUTPATIENT
Start: 2022-01-09 | End: 2022-01-09

## 2022-01-09 RX ORDER — VANCOMYCIN HCL 1 G
1250 VIAL (EA) INTRAVENOUS EVERY 12 HOURS
Refills: 0 | Status: DISCONTINUED | OUTPATIENT
Start: 2022-01-09 | End: 2022-01-09

## 2022-01-09 RX ORDER — TAMSULOSIN HYDROCHLORIDE 0.4 MG/1
0.4 CAPSULE ORAL AT BEDTIME
Refills: 0 | Status: DISCONTINUED | OUTPATIENT
Start: 2022-01-09 | End: 2022-01-11

## 2022-01-09 RX ORDER — SODIUM CHLORIDE 9 MG/ML
1000 INJECTION INTRAMUSCULAR; INTRAVENOUS; SUBCUTANEOUS
Refills: 0 | Status: DISCONTINUED | OUTPATIENT
Start: 2022-01-09 | End: 2022-01-10

## 2022-01-09 RX ORDER — ENOXAPARIN SODIUM 100 MG/ML
40 INJECTION SUBCUTANEOUS EVERY 24 HOURS
Refills: 0 | Status: DISCONTINUED | OUTPATIENT
Start: 2022-01-09 | End: 2022-01-11

## 2022-01-09 RX ADMIN — ENOXAPARIN SODIUM 40 MILLIGRAM(S): 100 INJECTION SUBCUTANEOUS at 05:40

## 2022-01-09 RX ADMIN — PIPERACILLIN AND TAZOBACTAM 200 GRAM(S): 4; .5 INJECTION, POWDER, LYOPHILIZED, FOR SOLUTION INTRAVENOUS at 00:42

## 2022-01-09 RX ADMIN — PIPERACILLIN AND TAZOBACTAM 200 GRAM(S): 4; .5 INJECTION, POWDER, LYOPHILIZED, FOR SOLUTION INTRAVENOUS at 05:39

## 2022-01-09 RX ADMIN — Medication 166.67 MILLIGRAM(S): at 07:34

## 2022-01-09 RX ADMIN — SODIUM CHLORIDE 140 MILLILITER(S): 9 INJECTION INTRAMUSCULAR; INTRAVENOUS; SUBCUTANEOUS at 02:46

## 2022-01-09 RX ADMIN — SODIUM CHLORIDE 100 MILLILITER(S): 9 INJECTION INTRAMUSCULAR; INTRAVENOUS; SUBCUTANEOUS at 20:44

## 2022-01-09 RX ADMIN — TAMSULOSIN HYDROCHLORIDE 0.4 MILLIGRAM(S): 0.4 CAPSULE ORAL at 22:48

## 2022-01-09 NOTE — H&P ADULT - NSHPPHYSICALEXAM_GEN_ALL_CORE
VITALS:   T(C): 36.4 (01-08-22 @ 23:16), Max: 36.5 (01-08-22 @ 17:36)  HR: 95 (01-08-22 @ 23:16) (85 - 95)  BP: 175/75 (01-08-22 @ 23:16) (148/73 - 175/75)  RR: 18 (01-08-22 @ 23:16) (17 - 18)  SpO2: 97% (01-08-22 @ 23:16) (97% - 100%)    GENERAL: NAD, lying in bed comfortably, disheveled   HEAD:  Atraumatic, Normocephalic  EYES: EOMI, PERRLA, conjunctiva and sclera clear  ENT: dry mucous membranes  NECK: Supple, No JVD  CHEST/LUNG: Clear to auscultation bilaterally; No rales, rhonchi, wheezing, or rubs. Unlabored respirations  HEART: Regular rate and rhythm; No murmurs, rubs, or gallops  ABDOMEN: BSx4; Soft, nontender, mildly distended  EXTREMITIES:  2+ Peripheral Pulses, brisk capillary refill. No clubbing, cyanosis, or edema  NERVOUS SYSTEM:  A&Ox3, no focal deficits   SKIN: chronic venous stasis in lower extremities with cut on right leg, no drainage

## 2022-01-09 NOTE — H&P ADULT - TIME BILLING
complex case, coordinated patient care with housestaff; answered all of patient's questions/ concerns

## 2022-01-09 NOTE — PROGRESS NOTE ADULT - PROBLEM SELECTOR PLAN 1
Patient presents after a mechanical fall. Unable to bring himself up to a stand for over 14 hrs. CT head and cervical spine negative for acute fracture. Elevated CK > 9000 and now s/p 2 L NS; no evidence of other electrolyte abnormalities. Alert and orientated x3 with karimi in place draining non-bloody, light colored yellow urine.   - fluids previously 140 cc/hr for 12 hours; currently on LR @ 100cc/hr   - monitor electrolytes   - maintain karimi for now with strict I+Os

## 2022-01-09 NOTE — PROGRESS NOTE ADULT - PROBLEM SELECTOR PLAN 2
CT a/p colitis of the descending colon/proximal/sigmoid colon. Unclear etiology, but differential includes viral v. bacterial infection, possible c-diff, or chronic inflammatory changes. Low suspicion for C. Diff as patient with fecal impaction noted on CT. C Diff discontinued.   - No active signs of infection at this time. Pt afebrile, hemodynamically stable. Abx discontinued  - f/u GI PCR  - Pt given water enema and had large well formed bowel movement. Continue to monitor bowel movements and urine output  - continue to monitor vitals to ensure no infectious etiology at this time however low suspicion

## 2022-01-09 NOTE — PROGRESS NOTE ADULT - PROBLEM SELECTOR PLAN 3
Patient found to be weak with fall unable to bring himself to stand. Says that he has been having diarrhea as well as poor PO intake for the last few days.   - PT and social work in AM   - no acute fractures noted on imaging in ED   - no loss of consciousness concerning for syncopal event

## 2022-01-09 NOTE — H&P ADULT - HISTORY OF PRESENT ILLNESS
Pharmacy requesting 90 day supply
81 M no significant pmh with recent admission in November for Acute colitis treated with azithromycin, lives alone and uses walker referred to ED by friend Dr. Joel for failure to thrive/dehydration, pt c/o diarrhea and b/l leg pain for unclear period of time. Patient has been in his usual state of health. He has been having diarrhea for an unknown period of time. Says that the diarrhea is watery and brown wihtout evience of blood. On Saturday morning, the patient moved from a sitting to standing position and fell. He was then unable to bring himself to a stand. Patient's friend called EMS, who found that his apartment was unlivable. On ROS, the patient denies headaches, vision changes, SOB, CP, palpitations, n/v/c, fever/chills, dysuria.     In the ED, the patient's vitals were temperature 97.7 F, HR 85, /73, RR 17 with spo2 100%. Labs were s/f WBC 12.95, hgb 12.1, neutrophilia, bicarb 21, bun 46, ast/alt 265/90, lactate 1.6, CK 9646, negative UA, COVID negative, CT cervical without acute pathology, CT head without acute pathology. CT a/p with oral and iv contrast showed distended bladder with 1600 cc or urine due to outlet obstruction, colitis of the descending colon/proximal/sigmoid colon, and prostamegaly. In the ED, the patient received zosyn 3.375 g IV x1, vancomycin 1250 mg IV x1, and NS 2 L.

## 2022-01-09 NOTE — H&P ADULT - PROBLEM SELECTOR PLAN 5
CT a/p colitis of the descending colon/proximal/sigmoid colon. Unclear etiology, but differential includes viral v. bacterial infection, possible c-diff, or chronic inflammatory changes.   - GI PCR and stool culture  - isolation precautions for c. diff and obtain c diff toxin   - can continue with zosyn for now CT a/p with oral and iv contrast showed distended bladder with 1600 cc or urine due to outlet obstruction;  prostamegaly.   - consider urology consult   - continue with karimi   - monitor I+Os; with excessive diuresis, patient may need fluid replacement Found to have mildly elevated liver enzymes, ast/alt 265/90. Unclear etiology. No use of over the counter supplements. No alcohol use.   - obtain acute hep panel   - consider abdominal ultrasound of liver   - continue to monitor

## 2022-01-09 NOTE — PROGRESS NOTE ADULT - PROBLEM SELECTOR PLAN 4
Patient noted to have chronic venous stasis ulcers bilaterally. Of note, there is a loss of skin with erythema on the right calf. No purulent drainage. No abx use since his Novemebr admission.   - vancomycin 1250 mg ordered in ED however no active discharge from LE or infectious clinical picture. Therefore, abx discontinued     #R/o dvt. Patient with chronic venous stasis ulcers possible complicated by cellulitis on right leg. Right leg mildly larger than left. Patient states to have been less mobile as of late.   - LE Dopplers showing no acute DVT in either LE. Showing bilateral lower leg subcutaneous edema.   - c/w SCDs

## 2022-01-09 NOTE — H&P ADULT - NSHPLABSRESULTS_GEN_ALL_CORE
LABS:                        12.1   12.95 )-----------( 299      ( 08 Jan 2022 20:09 )             36.4     01-08    140  |  106  |  46<H>  ----------------------------<  172<H>  4.0   |  21<L>  |  0.95    Ca    9.0      08 Jan 2022 20:09    TPro  6.9  /  Alb  3.8  /  TBili  0.8  /  DBili  x   /  AST  265<H>  /  ALT  90<H>  /  AlkPhos  71  01-08    PT/INR - ( 08 Jan 2022 20:09 )   PT: 14.0 sec;   INR: 1.18          PTT - ( 08 Jan 2022 20:09 )  PTT:29.1 sec  Urinalysis Basic - ( 08 Jan 2022 21:49 )    Color: Yellow / Appearance: Clear / SG: >=1.030 / pH: x  Gluc: x / Ketone: 15 mg/dL  / Bili: Negative / Urobili: 0.2 E.U./dL   Blood: x / Protein: Trace mg/dL / Nitrite: NEGATIVE   Leuk Esterase: NEGATIVE / RBC: < 5 /HPF / WBC 5-10 /HPF   Sq Epi: x / Non Sq Epi: 0-5 /HPF / Bacteria: Present /HPF      CAPILLARY BLOOD GLUCOSE            RADIOLOGY & ADDITIONAL TESTS: Reviewed.

## 2022-01-09 NOTE — H&P ADULT - ATTENDING COMMENTS
reviewed pertinent data , h&p  patient seen and examined overnight     rest of PE as above    1.        rest of  plan as above reviewed pertinent data , h&p  patient seen and examined overnight   a/f multiple medical issues, including Rhabdomyolysis, colitis/ diarrhea , urinary retention and lower ext cellulitis     elderly dishevelled male in NAD, obese, dry MM, cardiac/lung/ abdominal exam as above; +karimi, abdomen obese and distended; lower ext exam as above, in addition pt w/ dry plaques b/l at lower ext w/ erythema b/l , eryrhema worse at left  w/ mild LLEXT tenderness at anterior region ; unkempt feet noted b/l     1. Rhabdomyolysis , on IVFs o/n, monitor CK, monitor fluid status.   2. colitis/ diarrhea, followup stool studies including c.diff. on zosyn  3. lower ext cellulitis: on vanc, monitor for improvement; followup dopplers  4. monitor UO for POD, start flomax, consult urology       rest of  plan as above

## 2022-01-09 NOTE — H&P ADULT - ASSESSMENT
81 M no significant pmh with recent admission in November for Acute colitis treated with azithromycin, lives alone and uses walker referred to ED by friend Dr. Joel for failure to thrive/dehydration, pt c/o diarrhea and b/l leg pain for unclear period of time. Admitted for deconditioning, possible cellulitis v. dvt, and rhabdomyolysis.

## 2022-01-09 NOTE — PROGRESS NOTE ADULT - SUBJECTIVE AND OBJECTIVE BOX
MARGE SUMNER, 81y, Male  MRN-[id number]  Patient is a 81y old  Male who presents with a chief complaint of Rhabdo, urinary retention, lower ext cellulitis (09 Jan 2022 01:42)    OVERNIGHT EVENTS: NAEO     SUBJECTIVE: Pt seen and evaluated at bedside. Pt complaining of overt weakness. Per patient, he was unable to get up from the floor when he was down. He denied any acute chest pain, cough, SOB, abdominal pain at this time. Per patient, his LE wounds are chronic. Pt denies any recent fevers/chills/body aches.     12 Point ROS Negative unless noted otherwise above.  -------------------------------------------------------------------------------  VITAL SIGNS:  Vital Signs Last 24 Hrs  T(C): 36.7 (09 Jan 2022 17:37), Max: 36.9 (09 Jan 2022 07:42)  T(F): 98 (09 Jan 2022 17:37), Max: 98.4 (09 Jan 2022 07:42)  HR: 76 (09 Jan 2022 17:37) (76 - 95)  BP: 127/76 (09 Jan 2022 17:37) (127/76 - 175/75)  BP(mean): --  RR: 16 (09 Jan 2022 17:37) (16 - 18)  SpO2: 98% (09 Jan 2022 17:37) (96% - 100%)  I&O's Summary    08 Jan 2022 07:01  -  09 Jan 2022 07:00  --------------------------------------------------------  IN: 0 mL / OUT: 1000 mL / NET: -1000 mL    09 Jan 2022 07:01  -  09 Jan 2022 18:05  --------------------------------------------------------  IN: 0 mL / OUT: 950 mL / NET: -950 mL        PHYSICAL EXAM:    General: NAD, elderly male, unkempt appearing, laying in bed   HEENT: NC/AT; EOMI, PERRLA, anicteric sclera; dry mucosal membranes.  Neck: supple, trachea midline  Cardiovascular: RRR, +S1/S2; NO M/R/G  Respiratory: CTA B/L; no W/R/R  Gastrointestinal: soft, NT/ND; +BSx4  Extremities: WWP; b/l LE with chronic venous stasis changes noted. Linear cuts noted on RLE however no purulent or sanguinous drainage noted. Erythema bilateral LE from shins to feet.   Vascular: 2+ radial, DP/PT pulses B/L  Neurological: AAOx3; no focal deficits; sensation intact in LE b/l     ALLERGIES:  Allergies    No Known Allergies    Intolerances        MEDICATIONS:  MEDICATIONS  (STANDING):  enoxaparin Injectable 40 milliGRAM(s) SubCutaneous every 24 hours  influenza  Vaccine (HIGH DOSE) 0.7 milliLiter(s) IntraMuscular once  sodium chloride 0.9%. 1000 milliLiter(s) (140 mL/Hr) IV Continuous <Continuous>  tamsulosin 0.4 milliGRAM(s) Oral at bedtime    MEDICATIONS  (PRN):  acetaminophen     Tablet .. 650 milliGRAM(s) Oral every 6 hours PRN Temp greater or equal to 38C (100.4F), Mild Pain (1 - 3)      -------------------------------------------------------------------------------  LABS:                        10.4   8.93  )-----------( 247      ( 09 Jan 2022 06:08 )             30.3     01-09    137  |  108  |  38<H>  ----------------------------<  120<H>  4.1   |  17<L>  |  0.94    Ca    8.5      09 Jan 2022 06:08  Phos  2.4     01-09  Mg     2.2     01-09    TPro  5.8<L>  /  Alb  2.9<L>  /  TBili  0.9  /  DBili  x   /  AST  208<H>  /  ALT  77<H>  /  AlkPhos  58  01-09    LIVER FUNCTIONS - ( 09 Jan 2022 06:08 )  Alb: 2.9 g/dL / Pro: 5.8 g/dL / ALK PHOS: 58 U/L / ALT: 77 U/L / AST: 208 U/L / GGT: x           PT/INR - ( 08 Jan 2022 20:09 )   PT: 14.0 sec;   INR: 1.18          PTT - ( 08 Jan 2022 20:09 )  PTT:29.1 sec  Urinalysis Basic - ( 08 Jan 2022 21:49 )    Color: Yellow / Appearance: Clear / SG: >=1.030 / pH: x  Gluc: x / Ketone: 15 mg/dL  / Bili: Negative / Urobili: 0.2 E.U./dL   Blood: x / Protein: Trace mg/dL / Nitrite: NEGATIVE   Leuk Esterase: NEGATIVE / RBC: < 5 /HPF / WBC 5-10 /HPF   Sq Epi: x / Non Sq Epi: 0-5 /HPF / Bacteria: Present /HPF      CAPILLARY BLOOD GLUCOSE          Culture - Urine (collected 08 Jan 2022 21:54)  Source: Clean Catch Clean Catch (Midstream)  Preliminary Report (09 Jan 2022 10:00):    No growth to date      COVID-19 PCR: NotDetec (20 Nov 2021 07:06)  COVID-19 PCR: Negative (14 Nov 2021 04:24)      RADIOLOGY & ADDITIONAL TESTS: Reviewed.

## 2022-01-09 NOTE — H&P ADULT - PROBLEM SELECTOR PLAN 6
F-  cc/hr for 12 hrs   E-replete as needed  N- regular diet  C- FC  DVt ppx: Lovenox CT a/p colitis of the descending colon/proximal/sigmoid colon. Unclear etiology, but differential includes viral v. bacterial infection, possible c-diff, or chronic inflammatory changes.   - GI PCR and stool culture  - isolation precautions for c. diff and obtain c diff toxin   - can continue with zosyn for now ADD: plan below CT a/p with oral and iv contrast showed distended bladder with 1600 cc or urine due to outlet obstruction;  prostamegaly.   - consider urology consult   - continue with karimi   - monitor I+Os; with excessive diuresis, patient may need fluid replacement

## 2022-01-09 NOTE — H&P ADULT - PROBLEM SELECTOR PLAN 3
Patient noted to have chronic venous stasis ulcers bilaterally. Of note, there is a loss of skin with erythema on the right calf. No purulent drainage. No abx use since his Novemebr admission.   - vancomycin 1250 mg IV q 12 hrs for now     #R/o dvt. Patient with chronic venous stasis ulcers possible complicated by cellulitis on right leg. Right leg mildly larger than left. Patient states to have been less mobile as of late.   - lower extremity doppler bilaterally  - no SCDs pending dvt study CT a/p colitis of the descending colon/proximal/sigmoid colon. Unclear etiology, but differential includes viral v. bacterial infection, possible c-diff, or chronic inflammatory changes.   - GI PCR and stool culture  - isolation precautions for c. diff and obtain c diff toxin   - can continue with zosyn for now Patient found to be weak with fall unable to bring himself to stand. Says that he has been having diarrhea as well as poor PO intake for the last few days.   - PT and social work   - no acute fractures noted  - no loss of consciousness concerning for syncopal event

## 2022-01-09 NOTE — H&P ADULT - PROBLEM SELECTOR PLAN 2
Patient presents after a mechanical fall. Unable to bring himself up to a stand for over 14 hrs. CT head and cervical spine negative for acute fracture. Elevated CK > 9000 and now s/p 2 L NS; no evidence of other electrolyte abnormalities. Alert and orientated x3 with karimi in place draining non-bloody, light colored yellow urine.   - can start fluids at 140 cc/hr for the next 12 hrs and monitor urine output   - monitor electrolytes   - maintain karimi for now with strict I+Os Patient found to be weak with fall unable to bring himself to stand. Says that he has been having diarrhea as well as poor PO intake for the last few days.   - PT and social work   - no acute fractures noted  - no loss of consciousness concerning for syncopal event CT a/p colitis of the descending colon/proximal/sigmoid colon. Unclear etiology, but differential includes viral v. bacterial infection, possible c-diff, or chronic inflammatory changes.   - GI PCR and stool culture  - isolation precautions for c. diff and obtain c diff toxin   - can continue with zosyn for now

## 2022-01-09 NOTE — H&P ADULT - PROBLEM SELECTOR PLAN 4
CT a/p with oral and iv contrast showed distended bladder with 1600 cc or urine due to outlet obstruction;  prostamegaly.   - consider urology consult   - continue with karimi   - monitor I+Os; with excessive diuresis, patient may need fluid replacement Found to have mildly elevated liver enzymes, ast/alt 265/90. Unclear etiology. No use of over the counter supplements. No alcohol use.   - obtain acute hep panel   - consider abdominal ultrasound of liver   - continue to monitor Patient noted to have chronic venous stasis ulcers bilaterally. Of note, there is a loss of skin with erythema on the right calf. No purulent drainage. No abx use since his Novemebr admission.   - vancomycin 1250 mg IV q 12 hrs for now     #R/o dvt. Patient with chronic venous stasis ulcers possible complicated by cellulitis on right leg. Right leg mildly larger than left. Patient states to have been less mobile as of late.   - lower extremity doppler bilaterally  - no SCDs pending dvt study

## 2022-01-09 NOTE — H&P ADULT - PROBLEM SELECTOR PLAN 7
F-  cc/hr for 12 hrs   E-replete as needed  N- regular diet  C- FC  DVt ppx: Lovenox CT a/p with oral and iv contrast showed distended bladder with 1600 cc or urine due to outlet obstruction;  prostamegaly.   - consider urology consult   - continue with karimi   - monitor I+Os; with excessive diuresis, patient may need fluid replacement as above , start flomax as tolerated

## 2022-01-09 NOTE — PATIENT PROFILE ADULT - FALL HARM RISK - HARM RISK INTERVENTIONS
Assistance with ambulation/Assistance OOB with selected safe patient handling equipment/Communicate Risk of Fall with Harm to all staff/Discuss with provider need for PT consult/Monitor gait and stability/Reinforce activity limits and safety measures with patient and family/Tailored Fall Risk Interventions/Visual Cue: Yellow wristband and red socks/Bed in lowest position, wheels locked, appropriate side rails in place/Call bell, personal items and telephone in reach/Instruct patient to call for assistance before getting out of bed or chair/Non-slip footwear when patient is out of bed/[place] to call system/Physically safe environment - no spills, clutter or unnecessary equipment/Purposeful Proactive Rounding/Room/bathroom lighting operational, light cord in reach

## 2022-01-09 NOTE — H&P ADULT - PROBLEM SELECTOR PLAN 1
Patient found to be weak with fall unable to bring himself to stand. Says that he has been having diarrhea as well as poor PO intake for the last few days.   - PT and social work   - no acute fractures noted  - no loss of consciousness concerning for syncopal event Patient presents after a mechanical fall. Unable to bring himself up to a stand for over 14 hrs. CT head and cervical spine negative for acute fracture. Elevated CK > 9000 and now s/p 2 L NS; no evidence of other electrolyte abnormalities. Alert and orientated x3 with karimi in place draining non-bloody, light colored yellow urine.   - can start fluids at 140 cc/hr for the next 12 hrs and monitor urine output   - monitor electrolytes   - maintain kairmi for now with strict I+Os

## 2022-01-09 NOTE — PROGRESS NOTE ADULT - PROBLEM SELECTOR PLAN 6
CT a/p with oral and iv contrast showed distended bladder with 1600 cc or urine due to outlet obstruction;  prostamegaly.   - consider urology consult   - continue with karimi   - monitor I+Os; with excessive diuresis, patient may need fluid replacement  - c/w Flowmax

## 2022-01-09 NOTE — PROGRESS NOTE ADULT - PROBLEM SELECTOR PLAN 5
Found to have mildly elevated liver enzymes, ast/alt 265/90. Likely 2/2 Rhabdo. No use of over the counter supplements. No alcohol use.   - Acute hep panel non-reactive   - consider abdominal ultrasound of liver   - continue to monitor -- downtrending on AM labs

## 2022-01-09 NOTE — H&P ADULT - NSHPROSALLOTHERNEGRD_GEN_ALL_CORE
America Linares   is seen today for a follow-up visit.     She presents today with her daughter again for diarrhea and lower abdominal pain. She states 6-7 bouts of diarrhea per day, denies blood in stool. She states she has lost 30 pounds in the past 5-6 weeks. Stool studies were ordered on 2/12/21 but pt was not aware she needed to do this. She is currently tapering her Prednisone at this time. She has an appt. to see PCP today for labs pertaining to Graves disease, she was taken off her medication approx. 6 months ago. She also states she is having hair loss. 
All other review of systems negative, except as noted in HPI

## 2022-01-10 ENCOUNTER — TRANSCRIPTION ENCOUNTER (OUTPATIENT)
Age: 82
End: 2022-01-10

## 2022-01-10 DIAGNOSIS — R19.7 DIARRHEA, UNSPECIFIED: ICD-10-CM

## 2022-01-10 LAB
ANION GAP SERPL CALC-SCNC: 9 MMOL/L — SIGNIFICANT CHANGE UP (ref 5–17)
BUN SERPL-MCNC: 26 MG/DL — HIGH (ref 7–23)
CALCIUM SERPL-MCNC: 8.5 MG/DL — SIGNIFICANT CHANGE UP (ref 8.4–10.5)
CHLORIDE SERPL-SCNC: 111 MMOL/L — HIGH (ref 96–108)
CK SERPL-CCNC: 5466 U/L — HIGH (ref 30–200)
CO2 SERPL-SCNC: 21 MMOL/L — LOW (ref 22–31)
CREAT SERPL-MCNC: 0.89 MG/DL — SIGNIFICANT CHANGE UP (ref 0.5–1.3)
CULTURE RESULTS: SIGNIFICANT CHANGE UP
GLUCOSE SERPL-MCNC: 120 MG/DL — HIGH (ref 70–99)
HCT VFR BLD CALC: 27.2 % — LOW (ref 39–50)
HGB BLD-MCNC: 9 G/DL — LOW (ref 13–17)
MAGNESIUM SERPL-MCNC: 2.1 MG/DL — SIGNIFICANT CHANGE UP (ref 1.6–2.6)
MCHC RBC-ENTMCNC: 32.1 PG — SIGNIFICANT CHANGE UP (ref 27–34)
MCHC RBC-ENTMCNC: 33.1 GM/DL — SIGNIFICANT CHANGE UP (ref 32–36)
MCV RBC AUTO: 97.1 FL — SIGNIFICANT CHANGE UP (ref 80–100)
NRBC # BLD: 0 /100 WBCS — SIGNIFICANT CHANGE UP (ref 0–0)
PHOSPHATE SERPL-MCNC: 2.1 MG/DL — LOW (ref 2.5–4.5)
PLATELET # BLD AUTO: 209 K/UL — SIGNIFICANT CHANGE UP (ref 150–400)
POTASSIUM SERPL-MCNC: 4.3 MMOL/L — SIGNIFICANT CHANGE UP (ref 3.5–5.3)
POTASSIUM SERPL-SCNC: 4.3 MMOL/L — SIGNIFICANT CHANGE UP (ref 3.5–5.3)
RBC # BLD: 2.8 M/UL — LOW (ref 4.2–5.8)
RBC # FLD: 13.3 % — SIGNIFICANT CHANGE UP (ref 10.3–14.5)
SODIUM SERPL-SCNC: 141 MMOL/L — SIGNIFICANT CHANGE UP (ref 135–145)
SPECIMEN SOURCE: SIGNIFICANT CHANGE UP
WBC # BLD: 6.93 K/UL — SIGNIFICANT CHANGE UP (ref 3.8–10.5)
WBC # FLD AUTO: 6.93 K/UL — SIGNIFICANT CHANGE UP (ref 3.8–10.5)

## 2022-01-10 PROCEDURE — 99239 HOSP IP/OBS DSCHRG MGMT >30: CPT

## 2022-01-10 RX ORDER — SODIUM CHLORIDE 9 MG/ML
1000 INJECTION, SOLUTION INTRAVENOUS
Refills: 0 | Status: DISCONTINUED | OUTPATIENT
Start: 2022-01-10 | End: 2022-01-11

## 2022-01-10 RX ORDER — POLYETHYLENE GLYCOL 3350 17 G/17G
17 POWDER, FOR SOLUTION ORAL DAILY
Refills: 0 | Status: DISCONTINUED | OUTPATIENT
Start: 2022-01-10 | End: 2022-01-11

## 2022-01-10 RX ORDER — POLYETHYLENE GLYCOL 3350 17 G/17G
17 POWDER, FOR SOLUTION ORAL
Qty: 0 | Refills: 0 | DISCHARGE
Start: 2022-01-10

## 2022-01-10 RX ORDER — SENNA PLUS 8.6 MG/1
2 TABLET ORAL
Qty: 0 | Refills: 0 | DISCHARGE
Start: 2022-01-10

## 2022-01-10 RX ORDER — TAMSULOSIN HYDROCHLORIDE 0.4 MG/1
1 CAPSULE ORAL
Qty: 0 | Refills: 0 | DISCHARGE
Start: 2022-01-10

## 2022-01-10 RX ORDER — SENNA PLUS 8.6 MG/1
2 TABLET ORAL AT BEDTIME
Refills: 0 | Status: DISCONTINUED | OUTPATIENT
Start: 2022-01-10 | End: 2022-01-11

## 2022-01-10 RX ORDER — CEFPODOXIME PROXETIL 100 MG
2 TABLET ORAL
Qty: 0 | Refills: 0 | DISCHARGE
Start: 2022-01-10

## 2022-01-10 RX ORDER — CEFPODOXIME PROXETIL 100 MG
400 TABLET ORAL
Refills: 0 | Status: DISCONTINUED | OUTPATIENT
Start: 2022-01-10 | End: 2022-01-11

## 2022-01-10 RX ADMIN — POLYETHYLENE GLYCOL 3350 17 GRAM(S): 17 POWDER, FOR SOLUTION ORAL at 12:16

## 2022-01-10 RX ADMIN — ENOXAPARIN SODIUM 40 MILLIGRAM(S): 100 INJECTION SUBCUTANEOUS at 06:37

## 2022-01-10 RX ADMIN — Medication 400 MILLIGRAM(S): at 17:27

## 2022-01-10 RX ADMIN — TAMSULOSIN HYDROCHLORIDE 0.4 MILLIGRAM(S): 0.4 CAPSULE ORAL at 22:18

## 2022-01-10 RX ADMIN — SENNA PLUS 2 TABLET(S): 8.6 TABLET ORAL at 22:17

## 2022-01-10 NOTE — DISCHARGE NOTE PROVIDER - ATTENDING DISCHARGE PHYSICAL EXAMINATION:
General: NAD, elderly male   HEENT: NC/AT; EOMI, PERRLA, anicteric sclera; MMM.  Neck: supple.  Cardiovascular: RRR, +S1/S2; No M/R/G  Respiratory: CTA B/L; no W/R/R  Gastrointestinal: soft, distended, NT; +BSx4  Extremities: WWP; B/L LE with chronic venous stasis changes noted. Erythema and swelling noted bilaterally below the knee with tenderness to palpation of the RLE. Multiple open ulcers noted bilaterally with no purulent or sanguinous drainage noted. Decreased strength and limited ROM of the B/L LE.  Vascular: 2+ radial, DP/PT pulses B/L  Neurological: AAOx3; no focal deficits; sensation intact in B/L LE.

## 2022-01-10 NOTE — DIETITIAN INITIAL EVALUATION ADULT. - OTHER INFO
81 M no significant pmh with recent admission in November for Acute colitis treated with azithromycin, lives alone and uses walker referred to ED by friend Dr. Joel for failure to thrive/dehydration, pt c/o diarrhea and b/l leg pain for unclear period of time. Admitted for deconditioning, possible cellulitis v. dvt, and rhabdomyolysis.     Pt seen in room, resting in bed. Pt reports good appetite PTA, lives alone and usually orders take out. Denies chewing/swallowing difficulty. Currently on regular diet eating >75% of breakfast this AM. Pt reports stability at current weight, denies wt changes. Admit wt 160lbs. Pt denies N/V/D/C at present, reports diarrhea 2-3 days ago however now resolved. No pain noted. Skin with suspected deep tissue injury to right buttock. Pt complaining of back pain during visit, on pain regimen. Please see full recs below. Will continue to follow per RD protocol.

## 2022-01-10 NOTE — DIETITIAN INITIAL EVALUATION ADULT. - PROBLEM SELECTOR PLAN 4
Patient noted to have chronic venous stasis ulcers bilaterally. Of note, there is a loss of skin with erythema on the right calf. No purulent drainage. No abx use since his Novemebr admission.   - vancomycin 1250 mg IV q 12 hrs for now     #R/o dvt. Patient with chronic venous stasis ulcers possible complicated by cellulitis on right leg. Right leg mildly larger than left. Patient states to have been less mobile as of late.   - lower extremity doppler bilaterally  - no SCDs pending dvt study

## 2022-01-10 NOTE — DIETITIAN INITIAL EVALUATION ADULT. - PROBLEM SELECTOR PLAN 6
CT a/p with oral and iv contrast showed distended bladder with 1600 cc or urine due to outlet obstruction;  prostamegaly.   - consider urology consult   - continue with karimi   - monitor I+Os; with excessive diuresis, patient may need fluid replacement

## 2022-01-10 NOTE — DISCHARGE NOTE PROVIDER - NSDCFUADDAPPT_GEN_ALL_CORE_FT
The office of Dr. Joel will contact you to make a follow up appointment within 1 week of discharge from the hospital.

## 2022-01-10 NOTE — DISCHARGE NOTE PROVIDER - NSDCMRMEDTOKEN_GEN_ALL_CORE_FT
Cane for gait instability R53.01:   Rolling walker for gait instability R53.01:    Cane for gait instability R53.01:   cefpodoxime 200 mg oral tablet: 2 tab(s) orally 2 times a day  polyethylene glycol 3350 oral powder for reconstitution: 17 gram(s) orally once a day  Rolling walker for gait instability R53.01:   senna oral tablet: 2 tab(s) orally once a day (at bedtime)  tamsulosin 0.4 mg oral capsule: 1 cap(s) orally once a day (at bedtime)

## 2022-01-10 NOTE — PROGRESS NOTE ADULT - PROBLEM SELECTOR PLAN 5
Found to have mildly elevated liver enzymes, ast/alt 265/90. Likely 2/2 Rhabdo. No use of over the counter supplements. No alcohol use.   - Acute hep panel non-reactive   - consider abdominal ultrasound of liver   - continue to monitor -- downtrending on AM labs Mildly elevated LFTs, AST//90, likely 2/2 Rhabdo. Denies alcohol use, OTC supplement use.   - Acute Hep panel non-reactive   - Consider abdominal US of liver   - Continue to monitor, currently improving Likely i/s/o CK rhabdomyolysis  - continue to monitor

## 2022-01-10 NOTE — DIETITIAN INITIAL EVALUATION ADULT. - PROBLEM SELECTOR PLAN 5
Found to have mildly elevated liver enzymes, ast/alt 265/90. Unclear etiology. No use of over the counter supplements. No alcohol use.   - obtain acute hep panel   - consider abdominal ultrasound of liver   - continue to monitor

## 2022-01-10 NOTE — PHYSICAL THERAPY INITIAL EVALUATION ADULT - ACTIVE RANGE OF MOTION EXAMINATION, REHAB EVAL
Bilat ankle DF/PF WFL, Decreased ROM throughout BLE 2/2 cellulitis and stiffness; bilat knee flex ~15 deg/Left UE Active ROM was WNL (within normal limits)/Right UE Active ROM was WNL (within normal limits)

## 2022-01-10 NOTE — PROGRESS NOTE ADULT - PROBLEM SELECTOR PLAN 3
Patient found to be weak with fall unable to bring himself to stand. Says that he has been having diarrhea as well as poor PO intake for the last few days.   - PT and social work in AM   - no acute fractures noted on imaging in ED   - no loss of consciousness concerning for syncopal event CT a/p shows colitis of the sigmoid colon with fecal material throughout the colon. Likely stercoral colitis  - S/p water enema with large well formed bowel movement.   - Bowel regimen with Miralax and Senna.  - Monitor vitals and bowel movements. CT a/p shows colitis of the sigmoid colon with fecal material throughout the colon. Likely stercoral colitis  - Continue to monitor BM

## 2022-01-10 NOTE — PROGRESS NOTE ADULT - PROBLEM SELECTOR PLAN 4
Patient noted to have chronic venous stasis ulcers bilaterally. Of note, there is a loss of skin with erythema on the right calf. No purulent drainage. No abx use since his Novemebr admission.   - vancomycin 1250 mg ordered in ED however no active discharge from LE or infectious clinical picture. Therefore, abx discontinued     #R/o dvt. Patient with chronic venous stasis ulcers possible complicated by cellulitis on right leg. Right leg mildly larger than left. Patient states to have been less mobile as of late.   - LE Dopplers showing no acute DVT in either LE. Showing bilateral lower leg subcutaneous edema.   - c/w SCDs Pt reporting of multiple falls over the past few months and gradually increasing weakness.  - Pending PT recommendations.  - No acute fractures on imaging, no loss of consciousness concerning for syncopal event. Pt reporting of multiple falls over the past few months and gradually increasing weakness.  - PT recommending JONATHAN.  - No acute fractures on imaging, no loss of consciousness concerning for syncopal event. Patient reporting of multiple falls over the past few months and gradually increasing weakness.  - continue PT

## 2022-01-10 NOTE — PROGRESS NOTE ADULT - PROBLEM SELECTOR PLAN 1
Patient presents after a mechanical fall. Unable to bring himself up to a stand for over 14 hrs. CT head and cervical spine negative for acute fracture. Elevated CK > 9000 and now s/p 2 L NS; no evidence of other electrolyte abnormalities. Alert and orientated x3 with karimi in place draining non-bloody, light colored yellow urine.   - fluids previously 140 cc/hr for 12 hours; currently on LR @ 100cc/hr   - monitor electrolytes   - maintain karimi for now with strict I+Os Pt presents after mechanical fall, unable to get up for >14hrs. CK = 5466 reduced from >9000.  - CTH and C-spine negative for acute injury.  - S/p 2L bolus of NS, switched to LR @ 100cc/hr 2/2 mild acidosis likely from NS infusion  - Monitor electrolytes and CK daily. Pt presents after mechanical fall, unable to get up for >14hrs. CK downtrending.  - Continue to monitor CK

## 2022-01-10 NOTE — DIETITIAN INITIAL EVALUATION ADULT. - PROBLEM SELECTOR PLAN 1
Patient presents after a mechanical fall. Unable to bring himself up to a stand for over 14 hrs. CT head and cervical spine negative for acute fracture. Elevated CK > 9000 and now s/p 2 L NS; no evidence of other electrolyte abnormalities. Alert and orientated x3 with karimi in place draining non-bloody, light colored yellow urine.   - can start fluids at 140 cc/hr for the next 12 hrs and monitor urine output   - monitor electrolytes   - maintain karimi for now with strict I+Os

## 2022-01-10 NOTE — PHYSICAL THERAPY INITIAL EVALUATION ADULT - PERTINENT HX OF CURRENT PROBLEM, REHAB EVAL
81 M no significant pmh with recent admission in November for Acute colitis treated with azithromycin, lives alone and uses walker referred to ED by friend Dr. Joel for failure to thrive/dehydration, pt c/o diarrhea and b/l leg pain for unclear period of time. Patient has been in his usual state of health. He has been having diarrhea for an unknown period of time. Says that the diarrhea is watery and brown wihtout evience of blood.

## 2022-01-10 NOTE — PHYSICAL THERAPY INITIAL EVALUATION ADULT - PLANNED THERAPY INTERVENTIONS, PT EVAL
Name band;
balance training/bed mobility training/gait training/postural re-education/ROM/strengthening/stretching/transfer training

## 2022-01-10 NOTE — DISCHARGE NOTE PROVIDER - NSDCCPCAREPLAN_GEN_ALL_CORE_FT
PRINCIPAL DISCHARGE DIAGNOSIS  Diagnosis: Rhabdomyolysis  Assessment and Plan of Treatment:       SECONDARY DISCHARGE DIAGNOSES  Diagnosis: Cellulitis of right leg  Assessment and Plan of Treatment:     Diagnosis: Colitis  Assessment and Plan of Treatment:     Diagnosis: Fall  Assessment and Plan of Treatment:     Diagnosis: Prostate enlargement  Assessment and Plan of Treatment:     Diagnosis: Diarrhea  Assessment and Plan of Treatment:      PRINCIPAL DISCHARGE DIAGNOSIS  Diagnosis: Rhabdomyolysis  Assessment and Plan of Treatment: You were sent to the emergency room by Dr. Joel because you fell at home and you were unable to get up on your own after falling. You were found to have a condition called rhabomyolysis which is when your muscles are damaged and releases proteins and enzymes into the blood that can cause damage to your heart and kidneys. This likely occurred because of your fall and inability to mobilize after your fall. We have treated you with fluids while you were in the hospital and it has improved. You are now tolerating fluids by mouth and it is recommended that you continue to drink plenty of fluids once you are discharged from the hospital. Please follow up with your PCP for further management and care.      SECONDARY DISCHARGE DIAGNOSES  Diagnosis: Cellulitis of right leg  Assessment and Plan of Treatment: When you arrived to the emergency room you were found to have redness, swelling, and pain in your legs, especially on the right side. We were concerned that you had a clot in your leg but on ultrasound there was no clot in your    Diagnosis: Colitis  Assessment and Plan of Treatment:     Diagnosis: Fall  Assessment and Plan of Treatment:     Diagnosis: Prostate enlargement  Assessment and Plan of Treatment:      PRINCIPAL DISCHARGE DIAGNOSIS  Diagnosis: Rhabdomyolysis  Assessment and Plan of Treatment: You were sent to the emergency room by Dr. Joel because you fell at home and you were unable to get up on your own after falling. You were found to have a condition called rhabomyolysis which is when your muscles are damaged and releases proteins and enzymes into the blood that can cause damage to your heart and kidneys. This likely occurred because of your fall and inability to mobilize after your fall. We have treated you with fluids while you were in the hospital and it has improved. You are now tolerating fluids by mouth and it is recommended that you continue to drink plenty of fluids once you are discharged from the hospital. Please follow up with your PCP for further management and care.      SECONDARY DISCHARGE DIAGNOSES  Diagnosis: Cellulitis of right leg  Assessment and Plan of Treatment: When you arrived to the emergency room you were found to have redness, swelling, and pain in your legs, especially on the right side. We were concerned that you had a clot in your leg but no clots were visualized on ultrasound. Your symptoms are likely caused by an infection of the skin which we have been treating you with antibiotics    Diagnosis: Colitis  Assessment and Plan of Treatment:     Diagnosis: Fall  Assessment and Plan of Treatment:     Diagnosis: Prostate enlargement  Assessment and Plan of Treatment:      PRINCIPAL DISCHARGE DIAGNOSIS  Diagnosis: Rhabdomyolysis  Assessment and Plan of Treatment: You were sent to the emergency room by Dr. Joel because you fell at home and you were unable to get up on your own after falling. You were found to have a condition called rhabomyolysis which is when your muscles are damaged and releases proteins and enzymes into the blood that can cause damage to your heart and kidneys. This likely occurred because of your fall and inability to mobilize after your fall. We have treated you with fluids while you were in the hospital and it has improved. You are now tolerating fluids by mouth and it is recommended that you continue to drink plenty of fluids once you are discharged from the hospital. Please follow up with your PCP for further management and care.      SECONDARY DISCHARGE DIAGNOSES  Diagnosis: Cellulitis of right leg  Assessment and Plan of Treatment: When you arrived to the emergency room you were found to have redness, swelling, and pain in your legs, especially on the right side. We were concerned that you had a clot in your leg but no clots were visualized on ultrasound. Your symptoms are likely caused by an infection of the skin which we have been treating you with antibiotics    Diagnosis: Colitis  Assessment and Plan of Treatment:     Diagnosis: Fall  Assessment and Plan of Treatment: When you arrived to the emergency room, you reported of having a revent fall at home and you were unable to get up from the fall. You also reported of having multiple falls over the past few months. Physical therapist    Diagnosis: Prostate enlargement  Assessment and Plan of Treatment:      PRINCIPAL DISCHARGE DIAGNOSIS  Diagnosis: Rhabdomyolysis  Assessment and Plan of Treatment: You were sent to the emergency room by Dr. Joel because you fell at home and you were unable to get up on your own after falling. You were found to have a condition called rhabomyolysis which is when your muscles are damaged and releases proteins and enzymes into the blood that can cause damage to your heart and kidneys. This likely occurred because of your fall and inability to mobilize after your fall. We have treated you with fluids while you were in the hospital and it has improved. You are now tolerating fluids by mouth and it is recommended that you continue to drink plenty of fluids once you are discharged from the hospital. Please follow up with your PCP for further management and care.      SECONDARY DISCHARGE DIAGNOSES  Diagnosis: Cellulitis of right leg  Assessment and Plan of Treatment: When you arrived to the emergency room you were found to have redness, swelling, and pain in your legs, especially on the right side. We were concerned that you had a clot in your leg but no clots were visualized on ultrasound. Your symptoms are likely caused by an infection of the skin. You were started on antibiotics: cefpodoxime 400mg every 12 every for 7 days.    Diagnosis: Urinary retention  Assessment and Plan of Treatment: CT AP showed distended bladder with 1600 cc of urine due to outlet obstruction, likely 2/2 chronic constipation; Prostatomegaly. Fajardo placed.  - consider urology f/u outpatient  - Fajardo removed, pending TOV  - c/w Flomax    Diagnosis: Colitis  Assessment and Plan of Treatment: CT a/p shows colitis of the sigmoid colon with fecal material throughout the colon. Likely stercoral colitis.  - Continue to monitor BM      Diagnosis: Fall  Assessment and Plan of Treatment: When you arrived to the emergency room, you reported of having a recent fall at home and you were unable to get up from the fall. You also reported of having multiple falls over the past few months. Please continue to work with physical therapy during your time in subacute rehab.    Diagnosis: Prostate enlargement  Assessment and Plan of Treatment:      PRINCIPAL DISCHARGE DIAGNOSIS  Diagnosis: Rhabdomyolysis  Assessment and Plan of Treatment: You were sent to the emergency room by Dr. Joel because you fell at home and you were unable to get up on your own after falling. You were found to have a condition called rhabomyolysis which is when your muscles are damaged and releases proteins and enzymes into the blood that can cause damage to your heart and kidneys. This likely occurred because of your fall and inability to mobilize after your fall. We have treated you with fluids while you were in the hospital and it has improved. You are now tolerating fluids by mouth and it is recommended that you continue to drink plenty of fluids once you are discharged from the hospital. Please follow up with your PCP for further management and care.      SECONDARY DISCHARGE DIAGNOSES  Diagnosis: Cellulitis of right leg  Assessment and Plan of Treatment: When you arrived to the emergency room you were found to have redness, swelling, and pain in your legs, especially on the right side. We were concerned that you had a clot in your leg but no clots were visualized on ultrasound. Your symptoms are likely caused by an infection of the skin. You were started on antibiotics: cefpodoxime 400mg every 12 every for 7 days.    Diagnosis: Urinary retention  Assessment and Plan of Treatment: Imaging showed a distended bladder with 1600 cc of urine due to outlet obstruction, this was likely caused by a combination of your enlarged prostate as well as constipation. Please continue to monitor your bowel movements. We recently removed your Fajardo catheter. Please make sure that you continue to take your Flomax as it will help with your urination.       Diagnosis: Colitis  Assessment and Plan of Treatment: Imaging shows inflammation of the sigmoid colon with fecal material throughout the colon. Please continue to monitor your bowel movement.      Diagnosis: Fall  Assessment and Plan of Treatment: When you arrived to the emergency room, you reported of having a recent fall at home and you were unable to get up from the fall. You also reported of having multiple falls over the past few months. Please continue to work with physical therapy during your time in subacute rehab.    Diagnosis: Prostate enlargement  Assessment and Plan of Treatment:

## 2022-01-10 NOTE — PHYSICAL THERAPY INITIAL EVALUATION ADULT - GROSSLY INTACT, SENSORY
Grossly intact to light touch sensation throughout BUE; intact however reported dec sensation throughout BLE.

## 2022-01-10 NOTE — PHYSICAL THERAPY INITIAL EVALUATION ADULT - ADDITIONAL COMMENTS
Pt currently resides alone in elevator apartment, no CANDELARIA. Primarily household amb w/ RW. States encountering 3 falls within past 6 months 2/2 BLE weakness. No HHA.

## 2022-01-10 NOTE — DISCHARGE NOTE PROVIDER - CARE PROVIDER_API CALL
Shoaib Joel)  Cardiovascular Disease; Internal Medicine  [address] [facility], [address]  [place], [place] [address]  Phone: [phone]  Fax: [phone]  Follow Up Time:

## 2022-01-10 NOTE — PROGRESS NOTE ADULT - SUBJECTIVE AND OBJECTIVE BOX
MARGE SUMNER, 81y, Male  MRN-[id number]  Patient is a 81y old  Male who presents with a chief complaint of Rhabdo, urinary retention, lower ext cellulitis (09 Jan 2022 01:42)    OVERNIGHT EVENTS: NAEO     SUBJECTIVE: Pt seen and evaluated at bedside. Pt complaining of overt weakness. Per patient, he was unable to get up from the floor when he was down. He denied any acute chest pain, cough, SOB, abdominal pain at this time. Per patient, his LE wounds are chronic. Pt denies any recent fevers/chills/body aches.     12 Point ROS Negative unless noted otherwise above.  -------------------------------------------------------------------------------  VITAL SIGNS:  Vital Signs Last 24 Hrs  T(C): 37.5 (10 Juwan 2022 05:54), Max: 37.5 (10 Juwan 2022 05:54)  T(F): 99.5 (10 Juwan 2022 05:54), Max: 99.5 (10 Juwan 2022 05:54)  HR: 95 (10 Juwan 2022 05:54) (76 - 98)  BP: 132/63 (10 Juwan 2022 05:54) (124/83 - 140/64)  BP(mean): --  RR: 16 (10 Juwan 2022 05:54) (16 - 18)  SpO2: 96% (10 Juwan 2022 05:54) (96% - 100%)  I&O's Summary    09 Jan 2022 07:01  -  10 Juwan 2022 07:00  --------------------------------------------------------  IN: 1200 mL / OUT: 1700 mL / NET: -500 mL      PHYSICAL EXAM:    General: NAD, elderly male, unkempt appearing, laying in bed   HEENT: NC/AT; EOMI, PERRLA, anicteric sclera; dry mucosal membranes.  Neck: supple, trachea midline  Cardiovascular: RRR, +S1/S2; NO M/R/G  Respiratory: CTA B/L; no W/R/R  Gastrointestinal: soft, NT/ND; +BSx4  Extremities: WWP; b/l LE with chronic venous stasis changes noted. Linear cuts noted on RLE however no purulent or sanguinous drainage noted. Erythema bilateral LE from shins to feet.   Vascular: 2+ radial, DP/PT pulses B/L  Neurological: AAOx3; no focal deficits; sensation intact in LE b/l     ALLERGIES:  Allergies    No Known Allergies    Intolerances        MEDICATIONS  (STANDING):  enoxaparin Injectable 40 milliGRAM(s) SubCutaneous every 24 hours  influenza  Vaccine (HIGH DOSE) 0.7 milliLiter(s) IntraMuscular once  lactated ringers. 1000 milliLiter(s) (100 mL/Hr) IV Continuous <Continuous>  polyethylene glycol 3350 17 Gram(s) Oral daily  senna 2 Tablet(s) Oral at bedtime  tamsulosin 0.4 milliGRAM(s) Oral at bedtime    MEDICATIONS  (PRN):  acetaminophen     Tablet .. 650 milliGRAM(s) Oral every 6 hours PRN Temp greater or equal to 38C (100.4F), Mild Pain (1 - 3)        -------------------------------------------------------------------------------  LABS:                                   9.0    6.93  )-----------( 209      ( 10 Juwan 2022 09:04 )             27.2     01-10    141  |  111<H>  |  26<H>  ----------------------------<  120<H>     4.3   |  21<L>  |  0.89    Ca    8.5      10 Juwan 2022 09:02  Phos  2.1     01-10  Mg     2.1     01-10    TPro  5.8<L>  /  Alb  2.9<L>  /  TBili  0.9  /  DBili  x   /  AST  208<H>  /  ALT  77<H>  /  AlkPhos  58  01-09      LIVER FUNCTIONS - ( 09 Jan 2022 06:08 )  Alb: 2.9 g/dL / Pro: 5.8 g/dL / ALK PHOS: 58 U/L / ALT: 77 U/L / AST: 208 U/L / GGT: x           PT/INR - ( 08 Jan 2022 20:09 )   PT: 14.0 sec;   INR: 1.18          PTT - ( 08 Jan 2022 20:09 )  PTT:29.1 sec     Urinalysis Basic - ( 08 Jan 2022 21:49 )    Color: Yellow / Appearance: Clear / SG: >=1.030 / pH: x  Gluc: x / Ketone: 15 mg/dL  / Bili: Negative / Urobili: 0.2 E.U./dL   Blood: x / Protein: Trace mg/dL / Nitrite: NEGATIVE   Leuk Esterase: NEGATIVE / RBC: < 5 /HPF / WBC 5-10 /HPF   Sq Epi: x / Non Sq Epi: 0-5 /HPF / Bacteria: Present /HPF    CAPILLARY BLOOD GLUCOSE    Culture - Urine (collected 08 Jan 2022 21:54)  Source: Clean Catch Clean Catch (Midstream)  Preliminary Report (09 Jan 2022 10:00):    No growth to date      COVID-19 PCR: NotDetec (20 Nov 2021 07:06)  COVID-19 PCR: Negative (14 Nov 2021 04:24)      RADIOLOGY & ADDITIONAL TESTS: Reviewed.   SUBJECTIVE/OVERNIGHT EVENTS: Pt seen and evaluated at bedside this AM, lying comfortably in bed. Pt complaining of back pain and bilateral leg pain attributed to his falls. Otherwise, patient feeling well at this time. He currently denies any fever, chills, chest pain, cough, SOB, abdominal pain at this time.    12 Point ROS Negative unless noted otherwise above.  -------------------------------------------------------------------------------  VITAL SIGNS:  Vital Signs Last 24 Hrs  T(C): 37.5 (10 Juwan 2022 05:54), Max: 37.5 (10 Juwan 2022 05:54)  T(F): 99.5 (10 Juwan 2022 05:54), Max: 99.5 (10 Juwan 2022 05:54)  HR: 95 (10 Juwan 2022 05:54) (76 - 98)  BP: 132/63 (10 Juwan 2022 05:54) (124/83 - 140/64)  BP(mean): --  RR: 16 (10 Juwan 2022 05:54) (16 - 18)  SpO2: 96% (10 Juwan 2022 05:54) (96% - 100%)  I&O's Summary    09 Jan 2022 07:01  -  10 Juwan 2022 07:00  --------------------------------------------------------  IN: 1200 mL / OUT: 1700 mL / NET: -500 mL      PHYSICAL EXAM:    General: NAD, elderly male   HEENT: NC/AT; EOMI, PERRLA, anicteric sclera; MMM.  Neck: supple.  Cardiovascular: RRR, +S1/S2; NO M/R/G  Respiratory: CTA B/L; no W/R/R  Gastrointestinal: soft, NT, distended; +BSx4  Extremities: WWP; b/l LE with chronic venous stasis changes noted. Linear cuts noted on RLE however no purulent or sanguinous drainage noted. Erythema bilateral LE from shins to feet.   Vascular: 2+ radial, DP/PT pulses B/L  Neurological: AAOx3; no focal deficits; sensation intact in LE b/l     ALLERGIES:  Allergies    No Known Allergies    Intolerances        MEDICATIONS  (STANDING):  enoxaparin Injectable 40 milliGRAM(s) SubCutaneous every 24 hours  influenza  Vaccine (HIGH DOSE) 0.7 milliLiter(s) IntraMuscular once  lactated ringers. 1000 milliLiter(s) (100 mL/Hr) IV Continuous <Continuous>  polyethylene glycol 3350 17 Gram(s) Oral daily  senna 2 Tablet(s) Oral at bedtime  tamsulosin 0.4 milliGRAM(s) Oral at bedtime    MEDICATIONS  (PRN):  acetaminophen     Tablet .. 650 milliGRAM(s) Oral every 6 hours PRN Temp greater or equal to 38C (100.4F), Mild Pain (1 - 3)        -------------------------------------------------------------------------------  LABS:                                   9.0    6.93  )-----------( 209      ( 10 Juwan 2022 09:04 )             27.2     01-10    141  |  111<H>  |  26<H>  ----------------------------<  120<H>     4.3   |  21<L>  |  0.89    Ca    8.5      10 Juwan 2022 09:02  Phos  2.1     01-10  Mg     2.1     01-10    TPro  5.8<L>  /  Alb  2.9<L>  /  TBili  0.9  /  DBili  x   /  AST  208<H>  /  ALT  77<H>  /  AlkPhos  58  01-09      LIVER FUNCTIONS - ( 09 Jan 2022 06:08 )  Alb: 2.9 g/dL / Pro: 5.8 g/dL / ALK PHOS: 58 U/L / ALT: 77 U/L / AST: 208 U/L / GGT: x           PT/INR - ( 08 Jan 2022 20:09 )   PT: 14.0 sec;   INR: 1.18          PTT - ( 08 Jan 2022 20:09 )  PTT:29.1 sec     Urinalysis Basic - ( 08 Jan 2022 21:49 )    Color: Yellow / Appearance: Clear / SG: >=1.030 / pH: x  Gluc: x / Ketone: 15 mg/dL  / Bili: Negative / Urobili: 0.2 E.U./dL   Blood: x / Protein: Trace mg/dL / Nitrite: NEGATIVE   Leuk Esterase: NEGATIVE / RBC: < 5 /HPF / WBC 5-10 /HPF   Sq Epi: x / Non Sq Epi: 0-5 /HPF / Bacteria: Present /HPF    CAPILLARY BLOOD GLUCOSE    Culture - Urine (collected 08 Jan 2022 21:54)  Source: Clean Catch Clean Catch (Midstream)  Preliminary Report (09 Jan 2022 10:00):    No growth to date      COVID-19 PCR: NotDetec (20 Nov 2021 07:06)  COVID-19 PCR: Negative (14 Nov 2021 04:24)      RADIOLOGY & ADDITIONAL TESTS: Reviewed.   SUBJECTIVE/OVERNIGHT EVENTS: Pt seen and evaluated at bedside this AM, lying comfortably in bed, in no acute distress. Pt complaining of back pain and bilateral leg pain, according to the patient pain is attributed to his falls. Otherwise, patient feeling well at this time. He currently denies any fever, chills, chest pain, cough, SOB, abdominal pain, diarrhea at this time.    -------------------------------------------------------------------------------  VITAL SIGNS:  Vital Signs Last 24 Hrs  T(C): 37.5 (10 Juwan 2022 05:54), Max: 37.5 (10 Juwan 2022 05:54)  T(F): 99.5 (10 Juwan 2022 05:54), Max: 99.5 (10 Juwan 2022 05:54)  HR: 95 (10 Juwan 2022 05:54) (76 - 98)  BP: 132/63 (10 Juwan 2022 05:54) (124/83 - 140/64)  BP(mean): --  RR: 16 (10 Juwan 2022 05:54) (16 - 18)  SpO2: 96% (10 Juwan 2022 05:54) (96% - 100%)  I&O's Summary    09 Jan 2022 07:01  -  10 Juwan 2022 07:00  --------------------------------------------------------  IN: 1200 mL / OUT: 1700 mL / NET: -500 mL      PHYSICAL EXAM:    General: NAD, elderly male   HEENT: NC/AT; EOMI, PERRLA, anicteric sclera; MMM.  Neck: supple.  Cardiovascular: RRR, +S1/S2; No M/R/G  Respiratory: CTA B/L; no W/R/R  Gastrointestinal: soft, distended, NT; +BSx4  Extremities: WWP; B/L LE with chronic venous stasis changes noted. Erythema and swelling noted bilaterally below the knee with tenderness to palpation of the RLE. Multiple open ulcers noted bilaterally with no purulent or sanguinous drainage noted. Decreased strength and limited ROM of the B/L LE.  Vascular: 2+ radial, DP/PT pulses B/L  Neurological: AAOx3; no focal deficits; sensation intact in B/L LE.     ALLERGIES:  Allergies    No Known Allergies    Intolerances        MEDICATIONS  (STANDING):  enoxaparin Injectable 40 milliGRAM(s) SubCutaneous every 24 hours  influenza  Vaccine (HIGH DOSE) 0.7 milliLiter(s) IntraMuscular once  lactated ringers. 1000 milliLiter(s) (100 mL/Hr) IV Continuous <Continuous>  polyethylene glycol 3350 17 Gram(s) Oral daily  senna 2 Tablet(s) Oral at bedtime  tamsulosin 0.4 milliGRAM(s) Oral at bedtime    MEDICATIONS  (PRN):  acetaminophen     Tablet .. 650 milliGRAM(s) Oral every 6 hours PRN Temp greater or equal to 38C (100.4F), Mild Pain (1 - 3)        -------------------------------------------------------------------------------  LABS:                                   9.0    6.93  )-----------( 209      ( 10 Juwan 2022 09:04 )             27.2     01-10    141  |  111<H>  |  26<H>  ----------------------------<  120<H>     4.3   |  21<L>  |  0.89    Ca    8.5      10 Juwan 2022 09:02  Phos  2.1     01-10  Mg     2.1     01-10    TPro  5.8<L>  /  Alb  2.9<L>  /  TBili  0.9  /  DBili  x   /  AST  208<H>  /  ALT  77<H>  /  AlkPhos  58  01-09      LIVER FUNCTIONS - ( 09 Jan 2022 06:08 )  Alb: 2.9 g/dL / Pro: 5.8 g/dL / ALK PHOS: 58 U/L / ALT: 77 U/L / AST: 208 U/L / GGT: x           PT/INR - ( 08 Jan 2022 20:09 )   PT: 14.0 sec;   INR: 1.18          PTT - ( 08 Jan 2022 20:09 )  PTT:29.1 sec     Urinalysis Basic - ( 08 Jan 2022 21:49 )    Color: Yellow / Appearance: Clear / SG: >=1.030 / pH: x  Gluc: x / Ketone: 15 mg/dL  / Bili: Negative / Urobili: 0.2 E.U./dL   Blood: x / Protein: Trace mg/dL / Nitrite: NEGATIVE   Leuk Esterase: NEGATIVE / RBC: < 5 /HPF / WBC 5-10 /HPF   Sq Epi: x / Non Sq Epi: 0-5 /HPF / Bacteria: Present /HPF    CAPILLARY BLOOD GLUCOSE    Culture - Urine (collected 08 Jan 2022 21:54)  Source: Clean Catch Clean Catch (Midstream)  Preliminary Report (09 Jan 2022 10:00):    No growth to date      COVID-19 PCR: NotDetec (20 Nov 2021 07:06)  COVID-19 PCR: Negative (14 Nov 2021 04:24)      RADIOLOGY & ADDITIONAL TESTS: Reviewed.

## 2022-01-10 NOTE — DISCHARGE NOTE NURSING/CASE MANAGEMENT/SOCIAL WORK - NSDCVIVACCINE_GEN_ALL_CORE_FT
Tdap; 01-Oct-2020 14:27; Gabbie Jay (RN); [employer]; [id number] (Exp. Date: 09-Jul-2022); IntraMuscular; Deltoid Left.; 0.5 milliLiter(s); VIS (VIS Published: 09-May-2013, VIS Presented: 01-Oct-2020);

## 2022-01-10 NOTE — PHYSICAL THERAPY INITIAL EVALUATION ADULT - CRITERIA FOR SKILLED THERAPEUTIC INTERVENTIONS
functional limitations in following categories/risk reduction/prevention/rehab potential/therapy frequency/anticipated discharge recommendation

## 2022-01-10 NOTE — CONSULT NOTE ADULT - ASSESSMENT
per Internal Medicine    80 yo M no significant pmh with recent admission in November for Acute colitis treated with azithromycin, lives alone and uses walker referred to ED by friend Dr. Joel for failure to thrive/dehydration, pt c/o diarrhea and b/l leg pain for unclear period of time. Admitted for deconditioning, possible cellulitis v. dvt, and rhabdomyolysis.     Problem/Plan - 1:  ·  Problem: Rhabdomyolysis.   ·  Plan: Patient presents after a mechanical fall. Unable to bring himself up to a stand for over 14 hrs. CT head and cervical spine negative for acute fracture. Elevated CK > 9000 and now s/p 2 L NS; no evidence of other electrolyte abnormalities. Alert and orientated x3 with karimi in place draining non-bloody, light colored yellow urine.   - fluids previously 140 cc/hr for 12 hours; currently on LR @ 100cc/hr   - monitor electrolytes   - maintain karimi for now with strict I+Os.    Problem/Plan - 2:  ·  Problem: Colitis.   ·  Plan: CT a/p colitis of the descending colon/proximal/sigmoid colon. Unclear etiology, but differential includes viral v. bacterial infection, possible c-diff, or chronic inflammatory changes. Low suspicion for C. Diff as patient with fecal impaction noted on CT. C Diff discontinued.   - No active signs of infection at this time. Pt afebrile, hemodynamically stable. Abx discontinued  - f/u GI PCR  - Pt given water enema and had large well formed bowel movement. Continue to monitor bowel movements and urine output  - continue to monitor vitals to ensure no infectious etiology at this time however low suspicion.    Problem/Plan - 3:  ·  Problem: Fall.   ·  Plan: Patient found to be weak with fall unable to bring himself to stand. Says that he has been having diarrhea as well as poor PO intake for the last few days.   - PT and social work in AM   - no acute fractures noted on imaging in ED   - no loss of consciousness concerning for syncopal event.    Problem/Plan - 4:  ·  Problem: Cellulitis.   ·  Plan: Patient noted to have chronic venous stasis ulcers bilaterally. Of note, there is a loss of skin with erythema on the right calf. No purulent drainage. No abx use since his Novemebr admission.   - vancomycin 1250 mg ordered in ED however no active discharge from LE or infectious clinical picture. Therefore, abx discontinued     #R/o dvt. Patient with chronic venous stasis ulcers possible complicated by cellulitis on right leg. Right leg mildly larger than left. Patient states to have been less mobile as of late.   - LE Dopplers showing no acute DVT in either LE. Showing bilateral lower leg subcutaneous edema.   - c/w SCDs.    Problem/Plan - 5:  ·  Problem: Transaminitis.   ·  Plan: Found to have mildly elevated liver enzymes, ast/alt 265/90. Likely 2/2 Rhabdo. No use of over the counter supplements. No alcohol use.   - Acute hep panel non-reactive   - consider abdominal ultrasound of liver   - continue to monitor -- downtrending on AM labs.    Problem/Plan - 6:  ·  Problem: Urinary retention.   ·  Plan: CT a/p with oral and iv contrast showed distended bladder with 1600 cc or urine due to outlet obstruction;  prostamegaly.   - consider urology consult   - continue with karimi   - monitor I+Os; with excessive diuresis, patient may need fluid replacement  - c/w Flowmax.    Problem/Plan - 7:  ·  Problem: Prostate enlargement.   ·  Plan: as above , start flomax as tolerated.    Problem/Plan - 8:  ·  Problem: Nutrition, metabolism, and development symptoms.   ·  Plan: F-  cc/hr for 12 hrs   E-replete as needed  N- regular diet  C- FC  DVt ppx: Lovenox.

## 2022-01-10 NOTE — DISCHARGE NOTE NURSING/CASE MANAGEMENT/SOCIAL WORK - NSDCPEFALRISK_GEN_ALL_CORE
For information on Fall & Injury Prevention, visit: [email or web address] OR  [email or web address] OR  https://www.cdc.gov/steadi/patient.html

## 2022-01-10 NOTE — CONSULT NOTE ADULT - SUBJECTIVE AND OBJECTIVE BOX
Patient is a 81y old  Male who presents with a chief complaint of Rhabdo, urinary retention, lower ext cellulitis (10 Juwan 2022 05:54)       HPI:  81 M no significant pmh with recent admission in November for Acute colitis treated with azithromycin, lives alone and uses walker referred to ED by friend Dr. Joel for failure to thrive/dehydration, pt c/o diarrhea and b/l leg pain for unclear period of time. Patient has been in his usual state of health. He has been having diarrhea for an unknown period of time. Says that the diarrhea is watery and brown wihtout evience of blood. On Saturday morning, the patient moved from a sitting to standing position and fell. He was then unable to bring himself to a stand. Patient's friend called EMS, who found that his apartment was unlivable. On ROS, the patient denies headaches, vision changes, SOB, CP, palpitations, n/v/c, fever/chills, dysuria.     In the ED, the patient's vitals were temperature 97.7 F, HR 85, /73, RR 17 with spo2 100%. Labs were s/f WBC 12.95, hgb 12.1, neutrophilia, bicarb 21, bun 46, ast/alt 265/90, lactate 1.6, CK 9646, negative UA, COVID negative, CT cervical without acute pathology, CT head without acute pathology. CT a/p with oral and iv contrast showed distended bladder with 1600 cc or urine due to outlet obstruction, colitis of the descending colon/proximal/sigmoid colon, and prostamegaly. In the ED, the patient received zosyn 3.375 g IV x1, vancomycin 1250 mg IV x1, and NS 2 L.    (09 Jan 2022 01:42)      PAST MEDICAL & SURGICAL HISTORY:  Fall    Degenerative disc disease, cervical  w/ anterolisthesis    History of facial surgery  10/2020 after fall causing facial lacerations        MEDICATIONS  (STANDING):  enoxaparin Injectable 40 milliGRAM(s) SubCutaneous every 24 hours  influenza  Vaccine (HIGH DOSE) 0.7 milliLiter(s) IntraMuscular once  sodium chloride 0.9%. 1000 milliLiter(s) (100 mL/Hr) IV Continuous <Continuous>  tamsulosin 0.4 milliGRAM(s) Oral at bedtime    MEDICATIONS  (PRN):  acetaminophen     Tablet .. 650 milliGRAM(s) Oral every 6 hours PRN Temp greater or equal to 38C (100.4F), Mild Pain (1 - 3)        FAMILY HISTORY:  FH: colon cancer (Father)        CBC Full  -  ( 09 Jan 2022 06:08 )  WBC Count : 8.93 K/uL  RBC Count : 3.21 M/uL  Hemoglobin : 10.4 g/dL  Hematocrit : 30.3 %  Platelet Count - Automated : 247 K/uL  Mean Cell Volume : 94.4 fl  Mean Cell Hemoglobin : 32.4 pg  Mean Cell Hemoglobin Concentration : 34.3 gm/dL  Auto Neutrophil # : 7.23 K/uL  Auto Lymphocyte # : 0.67 K/uL  Auto Monocyte # : 0.91 K/uL  Auto Eosinophil # : 0.07 K/uL  Auto Basophil # : 0.01 K/uL  Auto Neutrophil % : 81.0 %  Auto Lymphocyte % : 7.5 %  Auto Monocyte % : 10.2 %  Auto Eosinophil % : 0.8 %  Auto Basophil % : 0.1 %      01-09    137  |  108  |  38<H>  ----------------------------<  120<H>  4.1   |  17<L>  |  0.94    Ca    8.5      09 Jan 2022 06:08  Phos  2.4     01-09  Mg     2.2     01-09    TPro  5.8<L>  /  Alb  2.9<L>  /  TBili  0.9  /  DBili  x   /  AST  208<H>  /  ALT  77<H>  /  AlkPhos  58  01-09      Urinalysis Basic - ( 08 Jan 2022 21:49 )    Color: Yellow / Appearance: Clear / SG: >=1.030 / pH: x  Gluc: x / Ketone: 15 mg/dL  / Bili: Negative / Urobili: 0.2 E.U./dL   Blood: x / Protein: Trace mg/dL / Nitrite: NEGATIVE   Leuk Esterase: NEGATIVE / RBC: < 5 /HPF / WBC 5-10 /HPF   Sq Epi: x / Non Sq Epi: 0-5 /HPF / Bacteria: Present /HPF          Radiology:    < from: US Duplex Venous Lower Ext Complete, Bilateral (01.09.22 @ 10:48) >  ACC: [id number] EXAM:  US DPLX LWR EXT VEINS COMPL BI                          PROCEDURE DATE:  01/09/2022          INTERPRETATION:  CLINICAL INFORMATION: Lower extremity pain. Failure to   thrive.    COMPARISON: Lower extremity venous duplex from 11/14/2021.    TECHNIQUE: Duplex sonography of the BILATERAL LOWER extremity veins with   color and spectral Doppler, with and without compression.    FINDINGS:  Limited exam due to difficulty with patient positioning and bilateral   lower extremity edema.    RIGHT:  Normal compressibility of the RIGHT common femoral, femoral and popliteal   veins.  Doppler examination shows normal spontaneous and phasic flow.  No RIGHT calf vein thrombosis is detected.    LEFT:  Normal compressibility of the LEFT common femoral, femoral and popliteal   veins.  Doppler examination shows normal spontaneous and phasic flow.  Limited evaluation of the left popliteal vein. No LEFT calf vein   thrombosis is detected.    Bilateral lower leg subcutaneous edema.    IMPRESSION:  No visualized evidence of deep venous thrombosis in either lower   extremity.      < from: Xray Chest 1 View- PORTABLE-Urgent (01.08.22 @ 22:24) >  ACC: [id number] EXAM:  XR CHEST PORTABLE URGENT 1V                          PROCEDURE DATE:  01/08/2022          INTERPRETATION:  Clinical History: Sepsis    Frontal examination of the chest demonstrates the heart to be within   normal limits in transverse diameter. Prominent bronchovascular markings.   Right perihilar infiltrates cannot be excluded.    IMPRESSION: Prominent bronchovascular markings. Right perihilar   infiltrates cannot be excluded        < from: CT Head No Cont (01.08.22 @ 22:22) >  ACC: [id number] EXAM:  CT BRAIN                          PROCEDURE DATE:  01/08/2022          INTERPRETATION:  PROCEDURE: CT head without intravenous contrast    CLINICAL INDICATION: Fall. Altered mental status.    TECHNIQUE:  Multiple axial images were obtained and viewed at 5 mm   intervals from the skull base to the vertex. The images were reviewed in   brain and bone windows. Sagittal and coronal reformations are provided.    COMPARISON EXAMINATION: None.    FINDINGS:  VENTRICLES AND SULCI: Moderate parenchymal atrophy. No evidence for   obstructive hydrocephalus.  INTRA-AXIAL: No intracranial mass, acute hemorrhage, or midline shift is   present. No acute transcortical infarction. There is hypoattenuation of   the  subcortical and periventricular white matter, which is nonspecific   finding, but most likely represents sequela of chronic microvascular   ischemic disease. There are few bilateral lacunar infarcts.  EXTRA-AXIAL: Again noted is focal prominence of the extra-axial CSF along   the right inferior cerebellum which may represent an arachnoid cyst.  VISUALIZED SINUSES: Polyp/mucous cysts in the bilateral maxillary sinuses.  VISUALIZED MASTOIDS: Well aerated.  CALVARIUM: No fracture.  MISCELLANEOUS:  None.    IMPRESSION:    No acute intracranial hemorrhage or calvarial fracture.      < from: CT Cervical Spine No Cont (01.08.22 @ 22:23) >  ACC: [id number] EXAM:  CT CERVICAL SPINE                          PROCEDURE DATE:  01/08/2022          INTERPRETATION:  PROCEDURE: CT Cervical spine without contrast    INDICATION: Altered mental status. Fall.    TECHNIQUE: Multiple axial sections were obtained from the mid orbits to   the sternoclavicular joint. Sagittal and coronal reformats were obtained   from the axial data set. The images were reviewed in soft tissue and bone   windows.    COMPARISON: CT of the cervical spine 11/14/2021.    FINDINGS: The CT exam demonstrates mild reversal of the cervical spine.   There is a 4 mm anterolisthesis of C7 over T1. There is multilevel   vertebral body height loss and disc space narrowing similar to prior exam.    IMPRESSION:  No acute bony fracture or malalignment. Degenerative changes of the   cervical spine similar to prior CT from 11/14/2021.        < from: CT Abdomen and Pelvis w/ Oral Cont and w/ IV Cont (01.08.22 @ 22:22) >  ACC: [id number] EXAM:  CT ABDOMEN AND PELVIS OC IC                          PROCEDURE DATE:  01/08/2022          INTERPRETATION:  CT SCAN OF ABDOMEN AND PELVIS    History: Abdominal distention with diarrhea.    Technique: CT scan of abdomen and pelvis was performed from lung bases   through symphysis pubis. Intravenous and oral contrast material were   utilized. Axial, sagittal and coronal reformatted images were reviewed.   100 mL of Isovue-370 injected.    Comparison: CT of the abdomen and pelvis from 11/14/2021.    Findings:    Lower chest: Bilateral lung linear atelectasis. Aortic valve   calcification.    Liver:  There are multiple liver cysts which are grossly unchanged from   prior exam. Subcentimeter hyperdense focus segment seven of liver could   represent hemangioma.    Gallbladder: No radiopaque stones gallbladder.    Spleen:  Normal.    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 No main duct dilatation.    Adrenal glands:  Normal.    Kidneys: Bilateral renal cysts.    Adenopathy:  No lymphadenopathy in abdomen or pelvis.    Ascites: None.    Gastrointestinal tract: There is wall thickening of the sigmoid colon.   The sigmoid is redundant. Normal appendix. No significant diverticulosis.   No bowel obstruction or free air. Rectum is mildly distended measuring   5.5 cm diameter and is filled with fecal material. Colon is packed full   of fecal material. Small hiatal hernia. Contrast material seen in the   distal esophagus likely due to GE reflux. The ingested oral contrast   material has reached the terminal ileum.    Vessels: Normal.    Pelvic organs: Markedly distended bladder with about 1600 cc's of urine.   Subtle small radiodensity seen right inferior posterior urinary bladder   contiguous with the right UVJ. Enlarged heterogeneous prostate with   coarse calcifications. The prostate measures 5.3 x 4.4 x 5.8 cm.   Asymmetrical hypodensity of the left peripheral zone as compared to the   right. Bilateral prominent inguinal nodes. Patient appears to be status   post bilateral vasectomy.    Soft tissues: Anasarca which is asymmetric..    Bones: Degenerative disease of bilateral hips. Degenerative disc disease   L5-S1.    Impression:  1.  Markedly distended bladderwith about 1600 cc's of urine likely   secondary to outlet obstruction.  2.  Colitis of the sigmoid colon. Differential diagnosis includes   stercoral colitis. Colon packed with fecal material.  3.  Prostatomegaly . Recommend correlation with PSA levels.            Vital Signs Last 24 Hrs  T(C): 37.5 (10 Juwan 2022 05:54), Max: 37.5 (10 Juwan 2022 05:54)  T(F): 99.5 (10 Juwan 2022 05:54), Max: 99.5 (10 Juwan 2022 05:54)  HR: 95 (10 Juwan 2022 05:54) (76 - 98)  BP: 132/63 (10 Juwan 2022 05:54) (124/83 - 147/69)  BP(mean): --  RR: 16 (10 Juwan 2022 05:54) (16 - 18)  SpO2: 96% (10 Juwan 2022 05:54) (96% - 100%)        REVIEW OF SYSTEMS:  per HPI      Physical Exam:  unkempt 82 yo  gentleman lying in semi Bee's, position, c/o weakness    Head: normocephalic, atraumatic    Eyes: PERRLA, EOMI, no nystagmus, sclera anicteric    ENT: nasal discharge, uvula midline, no oropharyngeal erythema/exudate    Neck: supple, negative JVD, negative carotid bruits, no thyromegaly    Chest: CTA bilaterally, neg wheeze/ rhonchi/ rales/ crackles/ egophany    Cardiovascular: regular rate and rhythm, neg murmurs/rubs/gallops    Abdomen: soft, non distended, non tender to palpation in all 4 quadrants, negative rebound/guarding, normal bowel sounds    Extremities:  chronic venous stasis changes,  B/L LE erythema, TTP, several healing linear cuts R LE      Neurologic Exam:    Alert and oriented x 3      Motor Exam:    Right UE:            no focal weakness, > 3+/5 throughout    Left UE:              no focal weakness, > 3+/5 throughout    Right LE:             no focal weakness, > 3+/5 throughout    Left LE:               no focal weakness, > 3+/5 throughout               Sensation:           intact to light touch x 4 extremities                                              DTR:                  biceps/brachioradialis: equal                                                  patella/ankle: equal                           Gait:  not tested        PM&R Impression:    1) deconditioned  2) no focal weakness    Recommendations/ Plan :    1) Physical therapy focusing on therapeutic exercises, bed mobility/transfer out of bed evaluation, progressive ambulation with assistive devices prn.    2) Anticipated Disposition Plan/Recs:    pending functional progress

## 2022-01-10 NOTE — DISCHARGE NOTE PROVIDER - HOSPITAL COURSE
Discharge: do not delete    81 year old male with no significant past medical history with recent admission for acute colitis arrives to ED after mechanical fall in his home and was unable to get up from the ground for >14 hours. Patient was found to have CK >9000, with CTAP revealing colitis in the sigmoid colon and fecal matter throughout the colon, and chronic venous stasis ulcers throughout the BLE with erythema, edema, and tenderness to palpation of the RLE. Patient admitted for deconditioning, and management of colitis and cellulitis.    Inpatient hospital course:    Problem List/Medical Diagnoses:  #Colitis   Discharge: do not delete    81 year old male with no significant past medical history with recent admission for acute colitis arrives to ED after mechanical fall in his home and was unable to get up from the ground for >14 hours. Patient was found to have CK >9000, with CTAP revealing colitis in the sigmoid colon and fecal matter throughout the colon, and chronic venous stasis ulcers throughout the BLE with erythema, edema, and tenderness to palpation of the RLE. Patient admitted for deconditioning, and management of colitis and cellulitis.    Inpatient hospital course:    Problem List/Medical Diagnoses:  #Rhabdomyolysis    #Cellulitis    #Colitis    #Fall    #Transaminitis    #Urinary Retention    #Prostate enlargement      New medications/therapies: Cefpodoxime   New lines/hardware: none  Labs to be followed outpatient: none  Exam to be followed outpatient:     Discharge plan: discharge to    81 M no significant pmh with recent admission in November for Acute colitis treated with azithromycin, lives alone and uses walker referred to ED by friend Dr. Joel for failure to thrive/dehydration, pt c/o diarrhea and b/l leg pain for unclear period of time. Admitted for deconditioning, management of rhabdomyolysis and cellulitis.      Problem/Plan - 1:  ·  Problem: Rhabdomyolysis.   ·  Plan: Pt presents after mechanical fall, unable to get up for >14hrs. CK downtrending.  - Continue to monitor CK.     Problem/Plan - 2:  ·  Problem: Cellulitis.   ·  Plan: Patient with chronic venous stasis ulcers bilaterally. Of note, there is edema, erythema, and tenderness to palpation of the RLE, with no purulent drainage.  - C/W cefpodoxime 400mg PO BID for 7 days.     Problem/Plan - 3:  ·  Problem: Colitis.   ·  Plan: CT a/p shows colitis of the sigmoid colon with fecal material throughout the colon. Likely stercoral colitis  - Continue to monitor BM.     Problem/Plan - 4:  ·  Problem: Fall.   ·  Plan: Patient reporting of multiple falls over the past few months and gradually increasing weakness.  - continue PT.     Problem/Plan - 5:  ·  Problem: Transaminitis.   ·  Plan: Likely i/s/o CK rhabdomyolysis  - continue to monitor.     Problem/Plan - 6:  ·  Problem: Urinary retention.   ·  Plan: CT AP showed distended bladder with 1600 cc of urine due to outlet obstruction, likely 2/2 chronic constipation; Prostatomegaly.   - consider urology f/u  - Fajardo removed, pending TOV  - c/w Flomax.     Problem/Plan - 7:  ·  Problem: Prostate enlargement.   ·  Plan: as above, start Flomax as tolerated.        New medications/therapies: Cefpodoxime 400mg PO BID for 7 days  New lines/hardware: none  Labs to be followed outpatient: none  Exam to be followed outpatient: PCP    Discharge plan: JONATHAN   80 yo M no significant past medical Hx with recent admission in November for acute colitis treated with azithromycin, lives alone and uses walker referred to ED by friend Dr. Joel for failure to thrive/dehydration, pt c/o diarrhea and b/l leg pain for unclear period of time. Admitted for deconditioning, management of rhabdomyolysis and cellulitis.     #Rhabdomyolysis  Patient presents after mechanical fall, unable to get up for >14hrs. CK downtrending.  - Continue to monitor CK    #Cellulitis  Patient with chronic venous stasis ulcers bilaterally. Of note, there is edema, erythema, and tenderness to palpation of the RLE, with no purulent drainage.  - C/W cefpodoxime 400mg PO BID for 7 days (until 1/17).    #Colitis  CT a/p shows colitis of the sigmoid colon with fecal material throughout the colon. Likely stercoral colitis.  - Continue to monitor BM    #Fall   Patient reporting of multiple falls over the past few months and gradually increasing weakness.  - continue PT    #Urinary retention  CT AP showed distended bladder with 1600 cc of urine due to outlet obstruction, likely 2/2 chronic constipation; Prostatomegaly. Fajardo placed.  - consider urology f/u outpatient  - Fajardo removed, pending TOV  - c/w Flomax    #Prostate enlargement  - as above, start Flomax as tolerated    New medications/therapies: Cefpodoxime 400mg PO BID for 7 days (until 1/17)  New lines/hardware: none  Labs to be followed outpatient: none  Exam to be followed outpatient: PCP    Discharge plan: JONATHAN

## 2022-01-10 NOTE — PROGRESS NOTE ADULT - PROBLEM SELECTOR PLAN 6
CT a/p with oral and iv contrast showed distended bladder with 1600 cc or urine due to outlet obstruction;  prostamegaly.   - consider urology consult   - continue with karimi   - monitor I+Os; with excessive diuresis, patient may need fluid replacement  - c/w Flowmax CT AP showed distended bladder with 1600 cc of urine due to outlet obstruction, likely 2/2 chronic constipation; Prostatomegaly.   - consider urology consult   - Fajardo removed, pending TOV  - monitor I+Os  - c/w Flomax CT AP showed distended bladder with 1600 cc of urine due to outlet obstruction, likely 2/2 chronic constipation; Prostatomegaly.   - consider urology f/u  - Fajardo removed, pending TOV  - c/w Flomax

## 2022-01-10 NOTE — DISCHARGE NOTE NURSING/CASE MANAGEMENT/SOCIAL WORK - NURSING SECTION COMPLETE
Patient/Caregiver provided printed discharge information.
50 y/o M with h/o DM and HTN on metformin and Lisinopril p/w about 10 days of progressively worsening AMIN and SOB with fever and  fatigue.  Pt tested + for Covid 19 in the last 24 hours.  Pt denies any h/o smoking.  He has not been vaccinated against Covid.

## 2022-01-10 NOTE — PROGRESS NOTE ADULT - PROBLEM SELECTOR PLAN 8
F-  cc/hr for 12 hrs   E-replete as needed  N- regular diet  C- FC  DVt ppx: Lovenox F -  cc/hr for 12 hrs   E - replete as needed  N - regular diet  C - Fajardo removed, pending TOV  DVT ppx: Lovenox F: none  E: replete as needed  N: regular diet  C: Fajardo removed, pending TOV  DVT: Lovenox

## 2022-01-10 NOTE — DIETITIAN INITIAL EVALUATION ADULT. - PROBLEM SELECTOR PLAN 2
CT a/p colitis of the descending colon/proximal/sigmoid colon. Unclear etiology, but differential includes viral v. bacterial infection, possible c-diff, or chronic inflammatory changes.   - GI PCR and stool culture  - isolation precautions for c. diff and obtain c diff toxin   - can continue with zosyn for now

## 2022-01-10 NOTE — PROGRESS NOTE ADULT - ATTENDING COMMENTS
Patient is being discharged home, see discharge summary for details   otherwise agree with residents note   patient seen and examined by me on 1/10/22     Rhabdomyolysis without GERA   Cellulitis right lower extrmity

## 2022-01-10 NOTE — PROGRESS NOTE ADULT - ASSESSMENT
81 M no significant pmh with recent admission in November for Acute colitis treated with azithromycin, lives alone and uses walker referred to ED by friend Dr. Joel for failure to thrive/dehydration, pt c/o diarrhea and b/l leg pain for unclear period of time. Admitted for deconditioning, possible cellulitis v. dvt, and rhabdomyolysis.  81 M no significant pmh with recent admission in November for Acute colitis treated with azithromycin, lives alone and uses walker referred to ED by friend Dr. Joel for failure to thrive/dehydration, pt c/o diarrhea and b/l leg pain for unclear period of time. Admitted for deconditioning, management of rhabdomyolysis and cellulitis.

## 2022-01-10 NOTE — PHYSICAL THERAPY INITIAL EVALUATION ADULT - MANUAL MUSCLE TESTING RESULTS, REHAB EVAL
Grossly 4/5 throughout BUE in shoulder flex/ext and elbow flex/ext movements. Grossly good bilateral  strength. 3/5 throughout BLE in knee flex/ext, ankle DF/PF and hip flex.

## 2022-01-10 NOTE — PROGRESS NOTE ADULT - PROBLEM SELECTOR PLAN 2
CT a/p colitis of the descending colon/proximal/sigmoid colon. Unclear etiology, but differential includes viral v. bacterial infection, possible c-diff, or chronic inflammatory changes. Low suspicion for C. Diff as patient with fecal impaction noted on CT. C Diff discontinued.   - No active signs of infection at this time. Pt afebrile, hemodynamically stable. Abx discontinued  - f/u GI PCR  - Pt given water enema and had large well formed bowel movement. Continue to monitor bowel movements and urine output  - continue to monitor vitals to ensure no infectious etiology at this time however low suspicion Patient with chronic venous stasis ulcers bilaterally. Of note, there is edema, erythema, and tenderness to palpation of the RLE, with no purulent drainage.  - Vancomycin 1250 mg ordered in ED, now transitioned to Cefpodoxime PO 200mg BID x 7 days.    #R/o DVT. Patient with chronic venous stasis ulcers likely complicated by cellulitis of the RLE. RLE mildly larger than LLE. Patient reporting of decreased mobility recently.  - LE Dopplers with no acute DVT in BLE.   - c/w SCDs Patient with chronic venous stasis ulcers bilaterally. Of note, there is edema, erythema, and tenderness to palpation of the RLE, with no purulent drainage.  - Vancomycin 1250 mg ordered in ED, now transitioned to Cefpodoxime PO 200mg BID x 7 days.  - Wound care consulted, f/u recs    #R/o DVT. Patient with chronic venous stasis ulcers likely complicated by cellulitis of the RLE. RLE mildly larger than LLE. Patient reporting of decreased mobility recently.  - LE Dopplers with no acute DVT in BLE.   - c/w SCDs Patient with chronic venous stasis ulcers bilaterally. Of note, there is edema, erythema, and tenderness to palpation of the RLE, with no purulent drainage.  - Vancomycin 1250 mg ordered in ED, now transitioned to Cefpodoxime PO ___ BID x 7 days.  - Wound care consulted, f/u recs    #R/o DVT. Patient with chronic venous stasis ulcers likely complicated by cellulitis of the RLE. RLE mildly larger than LLE. Patient reporting of decreased mobility recently.  - LE Dopplers with no acute DVT in BLE.   - c/w SCDs Patient with chronic venous stasis ulcers bilaterally. Of note, there is edema, erythema, and tenderness to palpation of the RLE, with no purulent drainage.  - C/W cefpodoxime 400mg PO BID for 7 days

## 2022-01-10 NOTE — DISCHARGE NOTE NURSING/CASE MANAGEMENT/SOCIAL WORK - PATIENT PORTAL LINK FT
You can access the FollowMyHealth Patient Portal offered by [facility] by registering at the following website: [email or web address]. By joining [facility]’s FollowMyHealth portal, you will also be able to view your health information using other applications (apps) compatible with our system.

## 2022-01-11 VITALS
RESPIRATION RATE: 18 BRPM | SYSTOLIC BLOOD PRESSURE: 143 MMHG | DIASTOLIC BLOOD PRESSURE: 71 MMHG | HEART RATE: 88 BPM | TEMPERATURE: 98 F | OXYGEN SATURATION: 97 %

## 2022-01-11 RX ADMIN — Medication 400 MILLIGRAM(S): at 05:50

## 2022-01-11 RX ADMIN — Medication 650 MILLIGRAM(S): at 02:31

## 2022-01-11 RX ADMIN — ENOXAPARIN SODIUM 40 MILLIGRAM(S): 100 INJECTION SUBCUTANEOUS at 05:50

## 2022-01-11 RX ADMIN — Medication 650 MILLIGRAM(S): at 03:31

## 2022-01-11 NOTE — CHART NOTE - NSCHARTNOTEFT_GEN_A_CORE
Called by RN at 1:49am, [facility] had arrived to transport pt to [facility] but pt refusing to leave at this time. Spoke w/ pt, RN, and [facility] at bedside. Pt is AOx3 and cited lateness of [facility] and tiredness as reasons for refusing transport, stating he had been patient and had been waiting for transport that he was expecting at 5:30-6pm, transport was now >6 hours late and arrived in the middle of the night when pt wished to prioritize sleep, so at this time he is unwilling to go with a transport company that could not deliver its services to its customers in a timely/professional manner. Pt verbalized understanding of circumstances beyond writer and transport company's control leading to current situation but remained dissatisfied and unwilling to go tonight in spite of the possibility that he may encounter a similar situation tomorrow. [facility] asked pt to sign/initial RMA refusal form, but pt adamantly refusing to sign paperwork after reading T&C unless terms renegotiated, as he does not believe he is refusing medical care and/or transport as detailed on form. Thus, transportation canceled by MD as pt refused both transport to [facility] and signing documentation of refusal. [facility] employees advised scheduling transport in AM to optimize chance of transportation during day shift and avoid repeat of tonight's events.

## 2022-01-14 LAB
CULTURE RESULTS: SIGNIFICANT CHANGE UP
CULTURE RESULTS: SIGNIFICANT CHANGE UP
SPECIMEN SOURCE: SIGNIFICANT CHANGE UP
SPECIMEN SOURCE: SIGNIFICANT CHANGE UP

## 2022-01-16 DIAGNOSIS — R62.7 ADULT FAILURE TO THRIVE: ICD-10-CM

## 2022-01-16 DIAGNOSIS — A09 INFECTIOUS GASTROENTERITIS AND COLITIS, UNSPECIFIED: ICD-10-CM

## 2022-01-16 DIAGNOSIS — L97.919 NON-PRESSURE CHRONIC ULCER OF UNSPECIFIED PART OF RIGHT LOWER LEG WITH UNSPECIFIED SEVERITY: ICD-10-CM

## 2022-01-16 DIAGNOSIS — L03.115 CELLULITIS OF RIGHT LOWER LIMB: ICD-10-CM

## 2022-01-16 DIAGNOSIS — K59.09 OTHER CONSTIPATION: ICD-10-CM

## 2022-01-16 DIAGNOSIS — I83.229 VARICOSE VEINS OF LEFT LOWER EXTREMITY WITH BOTH ULCER OF UNSPECIFIED SITE AND INFLAMMATION: ICD-10-CM

## 2022-01-16 DIAGNOSIS — E66.9 OBESITY, UNSPECIFIED: ICD-10-CM

## 2022-01-16 DIAGNOSIS — I83.029 VARICOSE VEINS OF LEFT LOWER EXTREMITY WITH ULCER OF UNSPECIFIED SITE: ICD-10-CM

## 2022-01-16 DIAGNOSIS — N40.1 BENIGN PROSTATIC HYPERPLASIA WITH LOWER URINARY TRACT SYMPTOMS: ICD-10-CM

## 2022-01-16 DIAGNOSIS — T79.6XXA TRAUMATIC ISCHEMIA OF MUSCLE, INITIAL ENCOUNTER: ICD-10-CM

## 2022-01-16 DIAGNOSIS — I83.019 VARICOSE VEINS OF RIGHT LOWER EXTREMITY WITH ULCER OF UNSPECIFIED SITE: ICD-10-CM

## 2022-01-16 DIAGNOSIS — R74.01 ELEVATION OF LEVELS OF LIVER TRANSAMINASE LEVELS: ICD-10-CM

## 2022-01-16 DIAGNOSIS — E86.0 DEHYDRATION: ICD-10-CM

## 2022-01-20 PROCEDURE — 84100 ASSAY OF PHOSPHORUS: CPT

## 2022-01-20 PROCEDURE — 80053 COMPREHEN METABOLIC PANEL: CPT

## 2022-01-20 PROCEDURE — 84295 ASSAY OF SERUM SODIUM: CPT

## 2022-01-20 PROCEDURE — 85610 PROTHROMBIN TIME: CPT

## 2022-01-20 PROCEDURE — 82803 BLOOD GASES ANY COMBINATION: CPT

## 2022-01-20 PROCEDURE — 74177 CT ABD & PELVIS W/CONTRAST: CPT | Mod: MA

## 2022-01-20 PROCEDURE — 72170 X-RAY EXAM OF PELVIS: CPT

## 2022-01-20 PROCEDURE — 97116 GAIT TRAINING THERAPY: CPT

## 2022-01-20 PROCEDURE — 87040 BLOOD CULTURE FOR BACTERIA: CPT

## 2022-01-20 PROCEDURE — 83605 ASSAY OF LACTIC ACID: CPT

## 2022-01-20 PROCEDURE — 93970 EXTREMITY STUDY: CPT

## 2022-01-20 PROCEDURE — 85027 COMPLETE CBC AUTOMATED: CPT

## 2022-01-20 PROCEDURE — 80074 ACUTE HEPATITIS PANEL: CPT

## 2022-01-20 PROCEDURE — 97530 THERAPEUTIC ACTIVITIES: CPT

## 2022-01-20 PROCEDURE — 71045 X-RAY EXAM CHEST 1 VIEW: CPT

## 2022-01-20 PROCEDURE — 83735 ASSAY OF MAGNESIUM: CPT

## 2022-01-20 PROCEDURE — 81001 URINALYSIS AUTO W/SCOPE: CPT

## 2022-01-20 PROCEDURE — 97161 PT EVAL LOW COMPLEX 20 MIN: CPT

## 2022-01-20 PROCEDURE — 70450 CT HEAD/BRAIN W/O DYE: CPT | Mod: MA

## 2022-01-20 PROCEDURE — 99285 EMERGENCY DEPT VISIT HI MDM: CPT | Mod: 25

## 2022-01-20 PROCEDURE — 97110 THERAPEUTIC EXERCISES: CPT

## 2022-01-20 PROCEDURE — 82550 ASSAY OF CK (CPK): CPT

## 2022-01-20 PROCEDURE — 82330 ASSAY OF CALCIUM: CPT

## 2022-01-20 PROCEDURE — 85730 THROMBOPLASTIN TIME PARTIAL: CPT

## 2022-01-20 PROCEDURE — 87637 SARSCOV2&INF A&B&RSV AMP PRB: CPT

## 2022-01-20 PROCEDURE — 80048 BASIC METABOLIC PNL TOTAL CA: CPT

## 2022-01-20 PROCEDURE — 72125 CT NECK SPINE W/O DYE: CPT | Mod: MA

## 2022-01-20 PROCEDURE — 84132 ASSAY OF SERUM POTASSIUM: CPT

## 2022-01-20 PROCEDURE — 36415 COLL VENOUS BLD VENIPUNCTURE: CPT

## 2022-01-20 PROCEDURE — 96374 THER/PROPH/DIAG INJ IV PUSH: CPT

## 2022-01-20 PROCEDURE — 87086 URINE CULTURE/COLONY COUNT: CPT

## 2022-01-20 PROCEDURE — 97112 NEUROMUSCULAR REEDUCATION: CPT

## 2022-01-20 PROCEDURE — 51702 INSERT TEMP BLADDER CATH: CPT

## 2022-01-20 PROCEDURE — 85025 COMPLETE CBC W/AUTO DIFF WBC: CPT

## 2022-03-03 ENCOUNTER — APPOINTMENT (OUTPATIENT)
Dept: VASCULAR SURGERY | Facility: CLINIC | Age: 82
End: 2022-03-03
Payer: MEDICARE

## 2022-03-03 DIAGNOSIS — I50.812 CHRONIC RIGHT HEART FAILURE: ICD-10-CM

## 2022-03-03 PROCEDURE — 29580 STRAPPING UNNA BOOT: CPT | Mod: 50

## 2022-03-03 PROCEDURE — 99203 OFFICE O/P NEW LOW 30 MIN: CPT

## 2022-03-03 RX ORDER — FUROSEMIDE 80 MG/1
TABLET ORAL
Refills: 0 | Status: ACTIVE | COMMUNITY

## 2022-03-10 ENCOUNTER — APPOINTMENT (OUTPATIENT)
Dept: VASCULAR SURGERY | Facility: CLINIC | Age: 82
End: 2022-03-10

## 2022-03-17 ENCOUNTER — APPOINTMENT (OUTPATIENT)
Dept: VASCULAR SURGERY | Facility: CLINIC | Age: 82
End: 2022-03-17
Payer: MEDICARE

## 2022-03-17 DIAGNOSIS — L57.0 ACTINIC KERATOSIS: ICD-10-CM

## 2022-03-17 PROCEDURE — 99214 OFFICE O/P EST MOD 30 MIN: CPT

## 2022-03-17 RX ORDER — IMIQUIMOD 50 MG/G
5 CREAM TOPICAL
Qty: 24 | Refills: 0 | Status: ACTIVE | COMMUNITY
Start: 2022-03-17 | End: 1900-01-01

## 2022-03-24 NOTE — DISCHARGE NOTE NURSING/CASE MANAGEMENT/SOCIAL WORK - NSDPLANG ASIS_GEN_ALL_CORE
----- Message from Bernardo Meek MD sent at 3/24/2022  9:01 AM CDT -----  Please call pt with results   No

## 2022-03-31 ENCOUNTER — APPOINTMENT (OUTPATIENT)
Dept: VASCULAR SURGERY | Facility: CLINIC | Age: 82
End: 2022-03-31
Payer: MEDICARE

## 2022-03-31 DIAGNOSIS — I87.2 VENOUS INSUFFICIENCY (CHRONIC) (PERIPHERAL): ICD-10-CM

## 2022-03-31 DIAGNOSIS — I89.0 LYMPHEDEMA, NOT ELSEWHERE CLASSIFIED: ICD-10-CM

## 2022-03-31 PROCEDURE — 99212 OFFICE O/P EST SF 10 MIN: CPT

## 2022-07-22 NOTE — ED PROVIDER NOTE - CPE EDP PSYCH NORM
well developed, well nourished , in no acute distress , ambulating without difficulty , normal communication ability normal...

## 2023-05-08 NOTE — DISCHARGE NOTE PROVIDER - EXTENDED VTE YES NO FOR MLM ASPIRIN
[facility] MEDICINE PROGRESS NOTE   Patient: Faina Yuan  Today's Date: 5/8/2023    YOB: 1954  Admission Date: 5/3/2023    MRN: [id number]  Inpatient LOS: 5    Room: 25 Vasquez 

## 2023-11-04 NOTE — PHYSICAL THERAPY INITIAL EVALUATION ADULT - IMPAIRMENTS FOUND, PT EVAL
Requested by RN to see patient at bedside due to SOB and increased O2 requirement.    Pt does appear to have increased WOB w/ moments of lethargy. However per RN at bedside pt recently went to restroom and after coming back presented w/ increased O2 requirement and increased WOB.    On exam lung sounds clear to me. Although my exam is limited due to patient positioning. X-ray this AM appeared more congested than the one on 11/2. Per HF note pt requires an additional IV bumex yesterday around the same time. Pt unable to tell me when was the last time he urinated.    Considering pt's presentation:    # LOVE w/ increased WOB  - DDx include CO2 retention vs volume overload  - will order another bumex 1 as CXR does look to appear more congested  - will f/u on VBG to see if necessary to trial BiPAP gait, locomotion, and balance/muscle strength

## 2024-02-25 NOTE — H&P ADULT - PROBLEM SELECTOR PROBLEM 1
Fall You can access the FollowMyHealth Patient Portal offered by [facility] by registering at the following website: [email or web address]. By joining [facility]’s FollowMyHealth portal, you will also be able to view your health information using other applications (apps) compatible with our system. Rhabdomyolysis

## 2024-03-26 NOTE — PATIENT PROFILE ADULT - FALL HARM RISK
Internal Medicine Nephrology Nephrology Nephrology Internal Medicine Nephrology Pain Medicine Internal Medicine Internal Medicine Nephrology Nephrology-Cardiorenal Nephrology Nephrology Internal Medicine Pain Medicine Internal Medicine Internal Medicine Pain Medicine Internal Medicine Internal Medicine other Internal Medicine Internal Medicine Internal Medicine

## 2024-07-18 NOTE — ED ADULT NURSE NOTE - NSFALLRSKASSESASSIST_ED_ALL_ED
Post op follow up    CC:   Chief Complaint   Patient presents with    Post-Op     Sx 7/3/24 Left Lumbar 4-5 laminotomy, discectomy         HPI:   Aric Jackson is a 50 year old male with chief complaint of   Chief Complaint   Patient presents with    Post-Op     Sx 7/3/24 Left Lumbar 4-5 laminotomy, discectomy     Location is low back pain and left leg pain overall improved. Did have left leg increase in radicular pain 2 days ago, resolved today. Stopped pain medications 2 days ago.  Severity is moderate today 1/10. Frequency of symptoms is intermittent.  Duration is post operative.  Improved by time and worsened by unknown.    Incision: denies concerns  Taking: no pain medications at this time.   Reports able to perform ADLs now and was unable to perform ADLs prior to surgery. Happy with current improvement to pain.     Denies CHEST PAIN/SOB/calf pain. Denies fever/chills/nausea/vomiting.     Old records, summarized above in HPI, which were reviewed:  none    Current Medications:  Current Outpatient Medications   Medication Sig Dispense Refill    acetaminophen 500 MG Oral Tab Take 2 tablets (1,000 mg total) by mouth every 8 (eight) hours as needed for Pain. 90 tablet 0    cyclobenzaprine 5 MG Oral Tab Take 1 tablet (5 mg total) by mouth 3 (three) times daily as needed for Muscle spasms. NO driving 30 tablet 0    Cholecalciferol (VITAMIN D) 50 MCG (2000 UT) Oral Cap Take 1 capsule (2,000 Units total) by mouth daily. 90 capsule 3    atorvastatin 20 MG Oral Tab Take 1 tablet (20 mg total) by mouth nightly. 90 tablet 3    valsartan 40 MG Oral Tab Take 1 tablet (40 mg total) by mouth daily. 90 tablet 1    metFORMIN HCl 1000 MG Oral Tab Take 1 tablet (1,000 mg total) by mouth 2 (two) times daily with meals. 180 tablet 1    Lancets (ONETOUCH DELICA PLUS [id number]) Does not apply Misc 1 strip by In Vitro route 3 (three) times daily.      ONETOUCH ULTRA In Vitro Strip 3 (three) times daily.      Blood Glucose  Monitoring Suppl (ONETOUCH ULTRA 2) w/Device Does not apply Kit       oxyCODONE-acetaminophen 5-325 MG Oral Tab Take 1 tablet by mouth every 8 (eight) hours as needed. (Patient not taking: Reported on 7/18/2024)        HISTORY:  Past Medical History:    Back problem    Diabetes (HCC)    High blood pressure    High cholesterol    Visual impairment    Reading glasses      Past Surgical History:   Procedure Laterality Date    Back surgery  07/03/2024    Left Lumbar 4-5 laminotomy, discectomy      Family History   Problem Relation Age of Onset    No Known Problems Mother     No Known Problems Father     Other (lipoma) Maternal Grandmother     Other (snake bite) Paternal Grandfather     No Known Problems Paternal Uncle       Social History     Socioeconomic History    Marital status:    Tobacco Use    Smoking status: Never     Passive exposure: Never    Smokeless tobacco: Never   Vaping Use    Vaping status: Never Used   Substance and Sexual Activity    Alcohol use: Not Currently    Drug use: Never    Sexual activity: Yes     Partners: Female        Exam     A&Ox4 in no acute distress. Seated on exam chair    Non Antalgic gait, able to normal heel and toe walk. Incision is well approximated without drainage, swelling or erythema. Strength is 5/5 bilateral in the lower extremities. SLR is negative bilaterally. Sensation is intact to light touch to the bilateral  lower extremities. Denies calf pain. No edema in the lower extremities. Dorsalis pedis pulses are intact bilaterally.     Medical Decision Making:   Impression:   1. S/P lumbar laminectomy  - OP REFERRAL TO [facility]      Plan:  Activity: Continue spine precautions. Increase ambulation as tolerated. To transition to outpatient physical therapy once discharged from home health physical therapy.  Order provided.   Incision:  Okay to shower normal. Wait until full healing for submersion, usually in next 1-2 weeks. Okay for over the  counter scar creams as needed. Reviewed gentle scar mobilization.  Medications: Continue OTC pain medications as needed.   Work: Continue out of work status at this time. To review work status at the next follow up visit.     Follow up in 4  weeks for next routine post operative follow up.     Review treatment plan with the patient.  Return in about 4 weeks (around 8/15/2024), or if symptoms worsen or fail to improve.    Patient educated and verbalized understanding.  The patient indicates understanding of these issues and agrees to the plan.    Stacey Segura, DANAE-BC, [facility]  Collaborative: Ricardo Jones MD  Orthopedic Spine Surgeon  [facility]   [address] [place] [address], [address] [address]   t: [phone]   f: [phone]        yes

## 2024-07-30 NOTE — PATIENT PROFILE ADULT - NSPROPTRIGHTCAREGIVER_GEN_A_NUR
second one to be administer in afternoon. pt refused Lovenox despite teaching and education. pt c/o acid reflux, RN administer Protonix as ordered.
declines

## 2025-06-15 NOTE — ED PROVIDER NOTE - PHYSICAL EXAMINATION
Vitals reviewed  Gen: unkempt/disheveled appearing, nad, speaking in full sentences  Skin: b/l LEs w/ dry scaling skin and few abrasions, R>L e/ erythema and warmth, no discharge/fluctuance, circumferential R lower leg ttp over area of erythema, dried feces noted on b/l feet  HEENT: ncat, eomi, mmm  Neck/Back: no midline ttp/step off, no paraspinal ttp, pelvis intact   CV: rrr, no audible m/r/g  Resp: symmetrical expansion, ctab, no w/r/r  Abd: distended but soft, mild suprapubic ttp, no rebound/guarding, no cvat  Ext: skin as above, FROM throughout, no pitting edema, 2+ b/l radial pulses, faint palpable DP/PT pulses, cap refill intact, SILT all ext   Neuro: alert/orientedx3, no focal deficits Jessica Sharp  NP in Adult Gerontology Acute Care  [address] [place] [address]  Phone: [phone]  Fax: [phone]  Scheduled Appointment: 06/18/2025    Familia Culp  Clinical Neurophysiology  [address] [place] [address], [address]  [place] [address]  Phone: [phone]  Fax: [phone]  Follow Up Time: Routine   Vitals reviewed  Gen: unkempt/disheveled appearing, nad, speaking in full sentences  Skin: b/l LEs w/ dry scaling skin and few abrasions, R>L e/ erythema and warmth, no discharge/fluctuance, circumferential R lower leg ttp over area of erythema, dried feces noted on b/l feet  HEENT: ncat, eomi, mmm  Neck/Back: no midline ttp/step off, no paraspinal ttp, pelvis intact   CV: rrr, no audible m/r/g  Resp: symmetrical expansion, ctab, no w/r/r  Abd: distended but soft, mild suprapubic ttp, no rebound/guarding, no cvat  Ext: skin as above, DE LA ROSA, no pitting edema, 2+ b/l radial pulses, faint palpable DP/PT pulses, cap refill intact, SILT all ext   Neuro: alert/orientedx3, no focal deficits Familia Culp  Clinical Neurophysiology  [address], [address]  [place] [address]  Phone: [phone]  Fax: [phone]  Follow Up Time: 1 month    Darren Torre  Cardiovascular Disease  [address], [address]  [place] [address]  Phone: [phone]  Fax: [phone]  Scheduled Appointment: 06/23/2025
